# Patient Record
Sex: FEMALE | Race: WHITE | HISPANIC OR LATINO | Employment: OTHER | URBAN - METROPOLITAN AREA
[De-identification: names, ages, dates, MRNs, and addresses within clinical notes are randomized per-mention and may not be internally consistent; named-entity substitution may affect disease eponyms.]

---

## 2017-05-08 ENCOUNTER — ALLSCRIPTS OFFICE VISIT (OUTPATIENT)
Dept: OTHER | Facility: OTHER | Age: 72
End: 2017-05-08

## 2017-09-05 ENCOUNTER — APPOINTMENT (OUTPATIENT)
Dept: LAB | Facility: HOSPITAL | Age: 72
End: 2017-09-05
Attending: INTERNAL MEDICINE
Payer: MEDICARE

## 2017-09-05 ENCOUNTER — TRANSCRIBE ORDERS (OUTPATIENT)
Dept: ADMINISTRATIVE | Facility: HOSPITAL | Age: 72
End: 2017-09-05

## 2017-09-05 DIAGNOSIS — E03.9 UNSPECIFIED HYPOTHYROIDISM: ICD-10-CM

## 2017-09-05 DIAGNOSIS — I25.10 UNSPECIFIED CARDIOVASCULAR DISEASE: ICD-10-CM

## 2017-09-05 DIAGNOSIS — I25.10 UNSPECIFIED CARDIOVASCULAR DISEASE: Primary | ICD-10-CM

## 2017-09-05 LAB
ALBUMIN SERPL BCP-MCNC: 3.3 G/DL (ref 3.5–5)
ALP SERPL-CCNC: 52 U/L (ref 46–116)
ALT SERPL W P-5'-P-CCNC: 27 U/L (ref 12–78)
ANION GAP SERPL CALCULATED.3IONS-SCNC: 6 MMOL/L (ref 4–13)
AST SERPL W P-5'-P-CCNC: 17 U/L (ref 5–45)
BILIRUB SERPL-MCNC: 0.4 MG/DL (ref 0.2–1)
BUN SERPL-MCNC: 17 MG/DL (ref 5–25)
CALCIUM SERPL-MCNC: 9 MG/DL (ref 8.3–10.1)
CHLORIDE SERPL-SCNC: 106 MMOL/L (ref 100–108)
CO2 SERPL-SCNC: 32 MMOL/L (ref 21–32)
CREAT SERPL-MCNC: 1.04 MG/DL (ref 0.6–1.3)
GFR SERPL CREATININE-BSD FRML MDRD: 54 ML/MIN/1.73SQ M
GLUCOSE P FAST SERPL-MCNC: 89 MG/DL (ref 65–99)
POTASSIUM SERPL-SCNC: 3.8 MMOL/L (ref 3.5–5.3)
PROT SERPL-MCNC: 7.4 G/DL (ref 6.4–8.2)
SODIUM SERPL-SCNC: 144 MMOL/L (ref 136–145)
TSH SERPL DL<=0.05 MIU/L-ACNC: 0.73 UIU/ML (ref 0.36–3.74)

## 2017-09-05 PROCEDURE — 84443 ASSAY THYROID STIM HORMONE: CPT

## 2017-09-05 PROCEDURE — 36415 COLL VENOUS BLD VENIPUNCTURE: CPT | Performed by: INTERNAL MEDICINE

## 2017-09-05 PROCEDURE — 80053 COMPREHEN METABOLIC PANEL: CPT | Performed by: INTERNAL MEDICINE

## 2017-09-07 ENCOUNTER — GENERIC CONVERSION - ENCOUNTER (OUTPATIENT)
Dept: OTHER | Facility: OTHER | Age: 72
End: 2017-09-07

## 2017-09-07 ENCOUNTER — ALLSCRIPTS OFFICE VISIT (OUTPATIENT)
Dept: OTHER | Facility: OTHER | Age: 72
End: 2017-09-07

## 2017-09-15 ENCOUNTER — LAB REQUISITION (OUTPATIENT)
Dept: LAB | Facility: HOSPITAL | Age: 72
End: 2017-09-15
Payer: MEDICARE

## 2017-09-15 DIAGNOSIS — R31.21 ASYMPTOMATIC MICROSCOPIC HEMATURIA: ICD-10-CM

## 2017-09-15 LAB
BACTERIA UR QL AUTO: ABNORMAL /HPF
BILIRUB UR QL STRIP: NEGATIVE
CLARITY UR: CLEAR
COLOR UR: YELLOW
GLUCOSE UR STRIP-MCNC: NEGATIVE MG/DL
HGB UR QL STRIP.AUTO: ABNORMAL
KETONES UR STRIP-MCNC: NEGATIVE MG/DL
LEUKOCYTE ESTERASE UR QL STRIP: ABNORMAL
MUCOUS THREADS UR QL AUTO: ABNORMAL
NITRITE UR QL STRIP: NEGATIVE
NON-SQ EPI CELLS URNS QL MICRO: ABNORMAL /HPF
PH UR STRIP.AUTO: 6.5 [PH] (ref 5–9)
PROT UR STRIP-MCNC: NEGATIVE MG/DL
RBC #/AREA URNS AUTO: ABNORMAL /HPF
SP GR UR STRIP.AUTO: 1.01 (ref 1–1.03)
UROBILINOGEN UR QL STRIP.AUTO: 0.2 E.U./DL
WBC #/AREA URNS AUTO: ABNORMAL /HPF

## 2017-09-15 PROCEDURE — 81001 URINALYSIS AUTO W/SCOPE: CPT | Performed by: UROLOGY

## 2017-10-20 DIAGNOSIS — Z12.31 ENCOUNTER FOR SCREENING MAMMOGRAM FOR MALIGNANT NEOPLASM OF BREAST: ICD-10-CM

## 2018-01-13 VITALS
HEIGHT: 61 IN | BODY MASS INDEX: 35.02 KG/M2 | SYSTOLIC BLOOD PRESSURE: 148 MMHG | DIASTOLIC BLOOD PRESSURE: 68 MMHG | TEMPERATURE: 97.9 F | RESPIRATION RATE: 20 BRPM | WEIGHT: 185.5 LBS

## 2018-01-13 NOTE — PROGRESS NOTES
Assessment    1  Special screening for other neurological conditions (V80 09) (Z13 89)   2  Depression screen (V79 0) (Z13 89)   3  Chronic obstructive pulmonary disease (496) (J44 9)   4  Hypercholesterolemia (272 0) (E78 00)   5  Hypertension (401 9) (I10)   6  Painless hematuria (599 70) (R31 9)   7  Encounter for screening mammogram for breast cancer (V76 12) (Z12 31)   8  Need for influenza vaccination (V04 81) (Z23)    Plan   Chronic obstructive pulmonary disease    · Ventolin  (90 Base) MCG/ACT Inhalation Aerosol Solution; Two inhalations  every four to six hours as needed for wheeze   · Advair Diskus 500-50 MCG/DOSE Inhalation Aerosol Powder Breath Activated;  INHALE 1 PUFF TWICE DAILY   · Spiriva HandiHaler 18 MCG Inhalation Capsule; 1 capsule inhaled daily  Hypercholesterolemia, Hypertension    · Simvastatin 20 MG Oral Tablet (Zocor); take 1 tablet by mouth once daily  Hypertension    · Aspir-Low 81 MG Oral Tablet Delayed Release; TAKE 1 TABLET DAILY AS  DIRECTED   · Lisinopril 5 MG Oral Tablet; take 1 tablet by mouth once daily   · Metoprolol Tartrate 25 MG Oral Tablet; take 1/2 tablet by mouth twice a day  PMH: Need for influenza vaccination    · Fluzone High-Dose 0 5 ML Intramuscular Suspension Prefilled Syringe    *VB - Fall Risk Assessment  (Dx Z13 89 Screen for Neurologic Disorder); Status:Resulted - Requires Verification,Retrospective Authorization;   Done: 42RGG1379 12:00AM  Due:33Dke6622; Last Updated By:Fantasma Mckinney; 12/6/2016 1:34:42 PM;Ordered; For:Special screening for other neurological conditions; Ordered By:Mely Mack;   *VB - Urinary Incontinence Screen (Dx Z13 89 Screen for UI);  Status:Resulted - Requires Verification,Retrospective Authorization;   Done: 68CIA7421 12:00AM  Due:30Era4908; Last Updated Savannah Mims; 12/6/2016 1:34:42 PM;Ordered;    For:Encounter for special screening examination for genitourinary disorder, Special screening for other neurological conditions; Ordered By:Mely Mack;   *VB-Depression Screening; Status:Resulted - Requires Verification,Retrospective Authorization;   Done: 17URE5275 12:00AM  ICQ:[id number]; Last Updated Daxa Vallejo; 12/6/2016 1:34:42 PM;Ordered; For:Depression screen, Encounter for special screening examination for genitourinary disorder; Ordered By:Mely Mack;      Discussion/Summary  health maintenance visit Currently, she eats an adequate diet and has an inadequate exercise regimen  the risks and benefits of cervical cancer screening were discussed next cervical cancer screening is due now  patient will make another apt  doesnt want it right now Breast cancer screening: the risks and benefits of breast cancer screening were discussed and mammogram has been ordered  Colorectal cancer screening: the risks and benefits of colorectal cancer screening were discussed and colorectal cancer screening is current  The risks and benefits of immunizations were discussed, immunizations are needed and immunizations will be given as outlined in the orders  She was advised to be evaluated by an optometrist and a dentist  Advice and education were given regarding weight bearing exercise, weight loss, sunscreen use and advanced directive planning  Preventative: mammo needed  order given  Colonoscopy done 3 years ago, will obtain records  screening blood work current for this year  patient to RTC for PAP/pelvic because didn't want it today  HTN  well controlled on current regimen  meds refilled  CKD III: on ACEi  cont with HTN management  HLD: at goal on current statin dose  med refilled  COPD: quit smoking  symptoms on current regimen  meds refilled  Painless hematuria: seeing Dr Oma Mccord  has apt next week  Patient is able to Self-Care  Possible side effects of new medications were reviewed with the patient/guardian today  The treatment plan was reviewed with the patient/guardian   The patient/guardian understands and agrees with the treatment plan      Chief Complaint  cpe   REFILL ON MEDICATIONS      Advance Directives  Advance Directive [facility]:   The patient is not in agreement to receive the Advance Care Planning service    NO - Patient does not have an advance health care directive  Capacity/Competence: This patient has full decision making capacity for discussion of advance care planning and This patient has full decision making competency for discussion of advance care planning  History of Present Illness  HM, Adult Female:   General Health: The patient's health since the last visit is described as fair  She does not have regular dental visits  The patient hasn't seen dentist in over 5 years  used to wear dentures, but broke the bottom set so isnt wearing them anymore  She complains of vision problems  The patient complains of wears reading glasses, has seen optometry in over 5 years  Immunizations status: not up to date   Needs flu shot  Lifestyle:  She consumes a diverse and healthy diet  She does not exercise regularly  She does not use tobacco  She denies alcohol use  Reproductive health: the patient is postmenopausal    Screening: cancer screening reviewed and current  metabolic screening reviewed and updated  Review of Systems    Constitutional: no fever, not feeling poorly, no chills and not feeling tired  Eyes: eyesight problems and wears reading glasses  ENT: no sore throat and no hoarseness  Cardiovascular: no chest pain and no palpitations  Respiratory: no shortness of breath, no wheezing and no shortness of breath during exertion  Gastrointestinal: No complaints of abdominal pain, no constipation, no nausea or vomiting, no diarrhea, no bloody stools  Genitourinary: no incontinence  Musculoskeletal: arthralgias  Integumentary: dry skin  Neurological: no headache, no numbness, no tingling, no dizziness and no limb weakness     Psychiatric: no anxiety, no sleep disturbances and no depression  ROS reviewed  Active Problems    1  Acute Arthropathy Of The Hand (716 94)   2  Chronic obstructive pulmonary disease (496) (J44 9)   3  Hypercholesterolemia (272 0) (E78 00)   4  Hypertension (401 9) (I10)   5  Painless hematuria (599 70) (R31 9)   6  Postmenopausal Atrophic Vaginitis (627 3)   7  Sleep difficulties (780 50) (G47 9)   8  Urinary calculus (592 9) (N20 9)    Past Medical History    · History of Ankle pain, unspecified laterality   · History of Encounter for Papanicolaou cervical smear to confirm findings of recent  normal smear following initial abnormal smear (V72 32) (Z01 42)   · History of breast lump (V13 89) (N67 371)   · History of chronic obstructive lung disease (V12 69) (Z87 09)    Family History  Mother    · Family history of cerebrovascular accident (V17 1) (Z80 1)  Father    · Family history of diabetes mellitus (V18 0) (Z83 3)  Family History    · Family history of Emphysema   · Family history of Stroke Syndrome (V17 1)    Social History    · Ex-cigarette smoker (W65 28) (Z87 891)   · Never A Smoker   · Never Drank Alcohol   · Never Used Drugs    Current Meds   1  Advair Diskus 500-50 MCG/DOSE MISC; Therapy: (Recorded:74Afc6133) to Recorded   2  Aspir-Low 81 MG Oral Tablet Delayed Release; TAKE 1 TABLET DAILY AS DIRECTED; Therapy: 21GAI4402 to (Evaluate:23Qvy4879); Last Rx:23Jan2014 Ordered   3  Calcium 600 MG Oral Tablet; Therapy: (Carmell Spray) to Recorded   4  Daily Multiple Vitamins Oral Tablet; Therapy: (Carmell Spray) to Recorded   5  Lisinopril 5 MG Oral Tablet; take 1 tablet by mouth once daily; Therapy: 81WZU3570 to (Last Rx:37Wxf9806)  Requested for: 57Hpv9807 Ordered   6  Metoprolol Tartrate 25 MG Oral Tablet; take 1/2 tablet by mouth twice a day; Therapy: 64MHV9444 to (Last Rx:26Mrf8330)  Requested for: 31Kze2158 Ordered   7  Simvastatin 20 MG Oral Tablet; take 1 tablet by mouth once daily;    Therapy: 39CTJ6010 to (Evaluate:13Yes3039)  Requested for: 40JZA9088; Last   Rx:13Twi6630 Ordered   8  Spiriva HandiHaler 18 MCG Inhalation Capsule; Therapy: (Recorded:20Jan2014) to Recorded    Allergies    1  No Known Drug Allergies    2  No Known Latex Allergies    Vitals   Recorded: 16ZFN6597 10:59AM   Temperature 98 2 F   Heart Rate 72   Respiration 16   Systolic 456   Diastolic 64   Height 5 ft 1 in   Weight 183 lb    BMI Calculated 34 58   BSA Calculated 1 82   O2 Saturation 89   Pain Scale 0     Physical Exam    Constitutional obese  Eyes   Conjunctiva and lids: No swelling, erythema or discharge  Pupils and irises: Equal, round, reactive to light  Ears, Nose, Mouth, and Throat   Nasal mucosa, septum, and turbinates: Normal without edema or erythema  poor dentition  Oropharynx: Normal with no erythema, edema, exudate or lesions  Neck   Neck: Supple, symmetric, trachea midline, no masses  Thyroid: Normal, no thyromegaly  Pulmonary   Respiratory effort: No increased work of breathing or signs of respiratory distress  Auscultation of lungs: Clear to auscultation  Cardiovascular   Auscultation of heart: Normal rate and rhythm, normal S1 and S2, no murmurs  Pedal pulses: 2+ bilaterally  trace pitting edema in BLE  Abdomen   Abdomen: Non-tender, no masses  Liver and spleen: No hepatomegaly or splenomegaly  Musculoskeletal   Gait and station: Normal     Digits and nails: Normal without clubbing or cyanosis  Skin dry skin over elbow and knees, as well as feet, no breaks in skin     Psychiatric   Mood and affect: Normal        Results/Data  AUA Symptom Score 80ULN6633 11:07AM User, s     Test Name Result Flag Reference   AUA Symptom Score (for prostate disease) 1     Incomplete emptying: Not at all (0)  Frequency: Not at all (0)  Intermittency: Less than 1 times in 5 (1)  Urgency: Not at all (0)  Weak-stream: Not at all (0)  Straining: Not at all (0)  Nocturia: Not at all (0)   AUA Symptom Score (for prostate disease) - Score Category Mild     AUA Symptom Score (for prostate disease) - Quality of Life Due to Urinary Symptoms No Feelings         Attending Note  Attending Note: Attending Note: I discussed the case with the Resident and reviewed the Resident's note and I agree with the Resident management plan as it was presented to me  Level of Participation: I was present in clinic, but did not examine the patient  Signatures   Electronically signed by : Cary Otoole MD; Dec  6 2016 11:52AM EST                       (Author)    Electronically signed by :  JOAQUIN Castillo ; Dec 19 2016  4:41PM EST                       (Author)

## 2018-01-22 VITALS
WEIGHT: 185.6 LBS | OXYGEN SATURATION: 86 % | HEART RATE: 66 BPM | DIASTOLIC BLOOD PRESSURE: 70 MMHG | HEIGHT: 61 IN | SYSTOLIC BLOOD PRESSURE: 110 MMHG | BODY MASS INDEX: 35.04 KG/M2

## 2018-02-23 DIAGNOSIS — I10 ESSENTIAL HYPERTENSION: Primary | ICD-10-CM

## 2018-05-23 ENCOUNTER — TELEPHONE (OUTPATIENT)
Dept: PULMONOLOGY | Facility: MEDICAL CENTER | Age: 73
End: 2018-05-23

## 2018-05-23 DIAGNOSIS — J44.9 COPD (CHRONIC OBSTRUCTIVE PULMONARY DISEASE) WITH CHRONIC BRONCHITIS (HCC): Primary | ICD-10-CM

## 2018-05-23 NOTE — TELEPHONE ENCOUNTER
WAS A PATIENT OF DR VIVAR- BUT DOESN'T HAVE AN APPT HERE UNTIL June, SHE NEEDS A REFILL ON HER ADVAIR 500   CAN YOU PLEASE CALL HER IN A MONTH SUPPLY TO [facility] IN [place]

## 2018-05-30 DIAGNOSIS — J44.9 COPD MIXED TYPE (HCC): Primary | ICD-10-CM

## 2018-06-05 ENCOUNTER — OFFICE VISIT (OUTPATIENT)
Dept: FAMILY MEDICINE CLINIC | Facility: CLINIC | Age: 73
End: 2018-06-05
Payer: MEDICARE

## 2018-06-05 VITALS
SYSTOLIC BLOOD PRESSURE: 140 MMHG | WEIGHT: 181 LBS | HEART RATE: 76 BPM | OXYGEN SATURATION: 90 % | RESPIRATION RATE: 16 BRPM | HEIGHT: 61 IN | BODY MASS INDEX: 34.17 KG/M2 | DIASTOLIC BLOOD PRESSURE: 72 MMHG

## 2018-06-05 DIAGNOSIS — N18.30 STAGE 3 CHRONIC KIDNEY DISEASE (HCC): ICD-10-CM

## 2018-06-05 DIAGNOSIS — I25.119 CORONARY ARTERY DISEASE INVOLVING NATIVE HEART WITH ANGINA PECTORIS, UNSPECIFIED VESSEL OR LESION TYPE (HCC): ICD-10-CM

## 2018-06-05 DIAGNOSIS — E78.2 MIXED HYPERLIPIDEMIA: ICD-10-CM

## 2018-06-05 DIAGNOSIS — I10 ESSENTIAL HYPERTENSION: Primary | ICD-10-CM

## 2018-06-05 DIAGNOSIS — J43.2 CENTRILOBULAR EMPHYSEMA (HCC): ICD-10-CM

## 2018-06-05 PROBLEM — Z12.31 ENCOUNTER FOR SCREENING MAMMOGRAM FOR BREAST CANCER: Status: ACTIVE | Noted: 2018-06-05

## 2018-06-05 PROBLEM — I25.10 CAD (CORONARY ARTERY DISEASE): Status: ACTIVE | Noted: 2018-06-05

## 2018-06-05 PROCEDURE — 99397 PER PM REEVAL EST PAT 65+ YR: CPT | Performed by: FAMILY MEDICINE

## 2018-06-05 RX ORDER — IBUPROFEN 200 MG
CAPSULE ORAL
COMMUNITY

## 2018-06-05 RX ORDER — SIMVASTATIN 20 MG
TABLET ORAL
COMMUNITY
Start: 2018-04-19 | End: 2018-07-21 | Stop reason: SDUPTHER

## 2018-06-05 RX ORDER — ALBUTEROL SULFATE 90 UG/1
AEROSOL, METERED RESPIRATORY (INHALATION)
COMMUNITY
Start: 2016-12-06 | End: 2019-06-03 | Stop reason: SDUPTHER

## 2018-06-05 RX ORDER — ASPIRIN 81 MG/1
1 TABLET ORAL DAILY
COMMUNITY
Start: 2014-01-23

## 2018-06-05 RX ORDER — LISINOPRIL 5 MG/1
1 TABLET ORAL DAILY
COMMUNITY
Start: 2015-01-08 | End: 2018-08-22 | Stop reason: SDUPTHER

## 2018-06-05 RX ORDER — TIOTROPIUM BROMIDE 18 UG/1
CAPSULE ORAL; RESPIRATORY (INHALATION)
COMMUNITY
Start: 2018-05-23 | End: 2018-06-11 | Stop reason: SDUPTHER

## 2018-06-05 NOTE — PROGRESS NOTES
Assessment/Plan:     Diagnoses and all orders for this visit:    Essential hypertension  -     Basic metabolic panel; Future    Centrilobular emphysema (HCC)    Coronary artery disease involving native heart with angina pectoris, unspecified vessel or lesion type (HCC)    Mixed hyperlipidemia  -     Lipid panel; Future    Stage 3 chronic kidney disease  -     Basic metabolic panel; Future    Other orders  -     Leticia Rana 18 MCG inhalation capsule;   -     simvastatin (ZOCOR) 20 mg tablet;   -     aspirin (ASPIR-LOW) 81 mg EC tablet; Take 1 tablet by mouth daily  -     Calcium 600 MG tablet; Take by mouth  -     lisinopril (ZESTRIL) 5 mg tablet; Take 1 tablet by mouth daily  -     albuterol (VENTOLIN HFA) 90 mcg/act inhaler; Inhale  -     DAILY MULTIPLE VITAMINS PO; Take by mouth          Subjective:      Patient ID: Yesica Calvo is a 68 y o  female  Patient is a 77-year-old female with past medical history of COPD, hypertension, hyperlipidemia, CKD stage III, CAD here for follow-up  Patient currently on 4 L NC Oxygen q h s  patient was given referral for pulmonology and has an appointment on 06/11/2018  Patient also follows up with Cardiology, Dr Danelle Perez and has an upcoming appointment in September  Patient was given a mammogram slip during last visit but never got it done  She denies any smoking and drinks alcohol on occasion maybe couple times a year  Patient was glasses and [facility] to [facility]  Denies any fever, chest pain, shortness of breath, vision changes, abdominal or urinary symptoms  The following portions of the patient's history were reviewed and updated as appropriate: allergies, current medications, past family history, past medical history, past social history, past surgical history and problem list     Review of Systems   Constitutional: Negative for fever and unexpected weight change  HENT: Negative  Eyes: Negative  Respiratory: Negative    Negative for shortness of breath  Cardiovascular: Negative  Negative for chest pain  Gastrointestinal: Negative  Negative for abdominal pain, blood in stool, constipation, diarrhea, nausea and vomiting  Genitourinary: Negative  Neurological: Negative  Psychiatric/Behavioral: Negative  Objective:      /72 (BP Location: Left arm, Patient Position: Sitting)   Pulse 76   Resp 16   Ht 5' 1" (1 549 m)   Wt 82 1 kg (181 lb)   SpO2 90%   BMI 34 20 kg/m²          Physical Exam   Constitutional: She is oriented to person, place, and time  She appears well-developed and well-nourished  No distress  HENT:   Head: Normocephalic and atraumatic  Nose: Nose normal    Mouth/Throat: Oropharynx is clear and moist    Eyes: Conjunctivae and EOM are normal  Pupils are equal, round, and reactive to light  Neck: Normal range of motion  Neck supple  Cardiovascular: Normal rate, regular rhythm and normal heart sounds  Pulmonary/Chest: Effort normal and breath sounds normal  No respiratory distress  She has no wheezes  She has no rales  She exhibits no tenderness  Abdominal: Soft  Bowel sounds are normal  She exhibits no distension and no mass  There is no tenderness  There is no rebound and no guarding  Musculoskeletal: Normal range of motion  She exhibits no edema, tenderness or deformity  Lymphadenopathy:     She has no cervical adenopathy  Neurological: She is alert and oriented to person, place, and time  Skin: Skin is warm and dry  No rash noted  No erythema  No pallor  Psychiatric: She has a normal mood and affect  Nursing note and vitals reviewed

## 2018-06-11 ENCOUNTER — OFFICE VISIT (OUTPATIENT)
Dept: PULMONOLOGY | Facility: MEDICAL CENTER | Age: 73
End: 2018-06-11
Payer: MEDICARE

## 2018-06-11 ENCOUNTER — HOSPITAL ENCOUNTER (OUTPATIENT)
Dept: RADIOLOGY | Facility: HOSPITAL | Age: 73
Discharge: HOME | End: 2018-06-11
Payer: MEDICARE

## 2018-06-11 ENCOUNTER — TRANSCRIBE ORDERS (OUTPATIENT)
Dept: ADMINISTRATIVE | Facility: HOSPITAL | Age: 73
End: 2018-06-11

## 2018-06-11 VITALS
WEIGHT: 183 LBS | TEMPERATURE: 98.1 F | HEIGHT: 63 IN | SYSTOLIC BLOOD PRESSURE: 134 MMHG | BODY MASS INDEX: 32.43 KG/M2 | OXYGEN SATURATION: 89 % | HEART RATE: 65 BPM | RESPIRATION RATE: 12 BRPM | DIASTOLIC BLOOD PRESSURE: 82 MMHG

## 2018-06-11 DIAGNOSIS — R06.02 SOB (SHORTNESS OF BREATH) ON EXERTION: Primary | ICD-10-CM

## 2018-06-11 DIAGNOSIS — J96.11 CHRONIC RESPIRATORY FAILURE WITH HYPOXIA (HCC): ICD-10-CM

## 2018-06-11 DIAGNOSIS — Z87.891 FORMER SMOKER: ICD-10-CM

## 2018-06-11 DIAGNOSIS — J43.2 CENTRILOBULAR EMPHYSEMA (HCC): ICD-10-CM

## 2018-06-11 DIAGNOSIS — R06.02 SOB (SHORTNESS OF BREATH): ICD-10-CM

## 2018-06-11 DIAGNOSIS — R06.02 SOB (SHORTNESS OF BREATH): Primary | ICD-10-CM

## 2018-06-11 DIAGNOSIS — R06.00 DYSPNEA ON EXERTION: ICD-10-CM

## 2018-06-11 PROBLEM — R06.09 DYSPNEA ON EXERTION: Status: ACTIVE | Noted: 2018-06-11

## 2018-06-11 PROCEDURE — 99214 OFFICE O/P EST MOD 30 MIN: CPT | Performed by: NURSE PRACTITIONER

## 2018-06-11 PROCEDURE — 71046 X-RAY EXAM CHEST 2 VIEWS: CPT

## 2018-06-11 PROCEDURE — 94010 BREATHING CAPACITY TEST: CPT | Performed by: NURSE PRACTITIONER

## 2018-06-11 RX ORDER — ALBUTEROL SULFATE 2.5 MG/3ML
2.5 SOLUTION RESPIRATORY (INHALATION) 2 TIMES DAILY
Qty: 180 ML | Refills: 5 | Status: SHIPPED | OUTPATIENT
Start: 2018-06-11 | End: 2018-07-11

## 2018-06-11 RX ORDER — ALBUTEROL SULFATE 90 UG/1
2 AEROSOL, METERED RESPIRATORY (INHALATION) EVERY 6 HOURS PRN
COMMUNITY
End: 2018-06-11 | Stop reason: SDUPTHER

## 2018-06-11 RX ORDER — ALBUTEROL SULFATE 90 UG/1
2 AEROSOL, METERED RESPIRATORY (INHALATION) 4 TIMES DAILY
Qty: 18 G | Refills: 5 | Status: SHIPPED | OUTPATIENT
Start: 2018-06-11 | End: 2018-07-11

## 2018-06-11 RX ORDER — TIOTROPIUM BROMIDE 18 UG/1
18 CAPSULE ORAL; RESPIRATORY (INHALATION) DAILY
Qty: 30 CAPSULE | Refills: 5 | Status: SHIPPED | OUTPATIENT
Start: 2018-06-11 | End: 2018-12-27 | Stop reason: SDUPTHER

## 2018-06-11 NOTE — ASSESSMENT & PLAN NOTE
Debbie Schuster has moderately severe COPD with emphysema  Forced vital capacity is 1 59 L or 58% of predicted, FEV1 was 0 70 L or 34% and obstruction ratio is 44%  She is a former smoker and had smoked 1 pack a day for 30+ years and quit in 2010  She does have shortness of breath on exertion  Currently her COPD appears stable  I did review her records from her past pulmonologist in [place] and obstruction ratio appears to be the same  She will continue with her present regimen which includes Advair 500/51 puff b i d , Spiriva 1 puff daily and an albuterol as needed

## 2018-06-11 NOTE — ASSESSMENT & PLAN NOTE
Nila Varghese has history of dyspnea on exertion  She does follow with Cardiology  PFTs done today in the office reveal moderately severe obstruction  FVC was 1 59 L or 58% of predicted, FEV1 0 70 L or 34% of predicted obstruction  Ratio is 44%  She is using optimal therapy including Advair 500/51 puff b i d , Spiriva Handy haler 1 puff daily and Ventolin as needed  I did reorder albuterol for her nebulizer in did ask her to please use at least twice daily  She is agreeable to plan  Of care  She did qualify for supplemental oxygen but defers at this time  [place]jo oxygen with exertion was 88%  Weight loss would be advisable for this patient who lives a relatively sedentary lifestyle  Overall she is stable and I would request that a chest x-ray be done for baseline purposes

## 2018-06-11 NOTE — PATIENT INSTRUCTIONS
You have moderately severe COPD  Pulmonary function test done today are similar to those done in past   Your a former smoker and therefore I am ordering for you a screening CT scan of your lungs  Continue using her medication as prescribed  Follow-up in 6 months

## 2018-06-11 NOTE — PROGRESS NOTES
Assessment/Plan:       Problem List Items Addressed This Visit        Respiratory    Centrilobular emphysema ([address] )     Shelby Capellan has moderately severe COPD with emphysema  Forced vital capacity is 1 59 L or 58% of predicted, FEV1 was 0 70 L or 34% and obstruction ratio is 44%  She is a former smoker and had smoked 1 pack a day for 30+ years and quit in 2010  She does have shortness of breath on exertion  Currently her COPD appears stable  I did review her records from her past pulmonologist in [place] and obstruction ratio appears to be the same  She will continue with her present regimen which includes Advair 500/51 puff b i d , Spiriva 1 puff daily and an albuterol as needed  Relevant Medications    albuterol (2 5 mg/3 mL) 0 083 % nebulizer solution    SPIRIVA HANDIHALER 18 MCG inhalation capsule    albuterol (PROVENTIL HFA,VENTOLIN HFA) 90 mcg/act inhaler    Chronic respiratory failure with hypoxia (HCC)     Shelby Capellan has a history of chronic respiratory failure with hypoxia  Review of notes from 2011 stated that she had use supplemental oxygen on continuous basis  According to patient she currently uses 4 L at nighttime only  Room air oxygen at rest is 92% with exertion went to 88%  She does qualify for daytime oxygen at 2 L but currently defers  She also follows with cardiologist Dr Carmen Martinez  She has history of chronic kidney disease and also hypercholesterolemia  She also has a history of coronary artery disease that was non critical   Obesity  Exertional dyspnea secondary to underlying COPD  She had a sleep study done about 1 year ago which we resulted in no evidence of sleep disordered breathing  Other    Dyspnea on exertion     Shelby Capellan has history of dyspnea on exertion  She does follow with Cardiology  PFTs done today in the office reveal moderately severe obstruction  FVC was 1 59 L or 58% of predicted, FEV1 0 70 L or 34% of predicted obstruction  Ratio is 44%    She is using optimal therapy including Advair 500/51 puff b i d , Spiriva Handy haler 1 puff daily and Ventolin as needed  I did reorder albuterol for her nebulizer in did ask her to please use at least twice daily  She is agreeable to plan  Of care  She did qualify for supplemental oxygen but defers at this time  Jojo oxygen with exertion was 88%  Weight loss would be advisable for this patient who lives a relatively sedentary lifestyle  Overall she is stable and I would request that a chest x-ray be done for baseline purposes  Other Visit Diagnoses     SOB (shortness of breath) on exertion    -  Primary    Relevant Orders    POCT spirometry (Completed)    Former smoker        Relevant Orders    CT lung screening program            No Follow-up on file  All questions are answered to the patient's satisfaction and understanding  She verbalizes understanding  She is encouraged to call with any further questions or concerns  Portions of the record may have been created with voice recognition software  Occasional wrong word or "sound a like" substitutions may have occurred due to the inherent limitations of voice recognition software  Read the chart carefully and recognize, using context, where substitutions have occurred  ______________________________________________________________________    Chief Complaint:   Chief Complaint   Patient presents with   Lindsay Cisneros     was Dr Nash Given  Hx of COPD ,    Cough     off and on  non prodcutive     Shortness of Breath     upon exertion        Patient ID: Maki Woods is a 68 y o  y o  female has a past medical history of Breast lump; CHF (congestive heart failure) ([facility] [address] ); COPD (chronic obstructive pulmonary disease) ([facility] [address] ); Diverticulitis; Emphysema of lung ([facility] [address] ); History of chronic obstructive lung disease; Non-productive cough; and SOBOE (shortness of breath on exertion)  6/11/2018  Maki Woods is a 60-year-old female who has a history of COPD    She has followed with Dr Annalee Sousa in the past who recently retired  According to medical records she was diagnosed with COPD several years ago and has history of congestive heart failure  In 2011 she was using 4 L of oxygen 24 hr a day as well as Spiriva Combivent and Duo nebs  In the past her FEV1 has been 32% of predicted with had put her in the category of severe COPD  She has smoked 1 pack a day cigarettes for at least 30 years and quit in March 2010  Chest x-ray done in 2010 showed hyperinflation  She also had an echo done in May of 2010 showing a right ventricular systolic pressure upper limits of normal without significant pulmonary hypertension and normal LV and systolic function  She did have spirometry done in January 2011 showing FEV1 severely reduced at 1 04 L or 48% of predicted with obstruction ratio 57  In 2011 she was also hypoxic on room air  According to patient who presents today as a new patient, she now uses only oxygen at night 4 L supplemental oxygen  She does not use oxygen during the day nor does she want it  She was last seen by Dr Annalee Sousa in November of 2017  According to patient's notes she did have overnight oxygen supplied by [employer]   In November of 2017 nighttime oxygen on room air revealed oxygen below 89% for 6 hr and 56 min  PFTs that her reviewed from the past reveal forced vital capacity of 1 19 L or 56% of predicted, FEV1 0 71 L or 40% of predicted obstruction ratio is 59%  Flow volume loop shows severe obstructive defect  Post bronchodilation in the past did not show any significant improvement  Cough   This is a chronic problem  The current episode started more than 1 year ago  The problem has been waxing and waning  The problem occurs hourly  The cough is non-productive  Associated symptoms include postnasal drip and shortness of breath  The symptoms are aggravated by exercise  Risk factors for lung disease include smoking/tobacco exposure   She has tried a beta-agonist inhaler, ipratropium inhaler and steroid inhaler for the symptoms  The treatment provided mild relief  Her past medical history is significant for COPD and emphysema  Shortness of Breath   This is a chronic problem  The current episode started more than 1 year ago  The problem has been unchanged  The average episode lasts 60 seconds  The symptoms are aggravated by exercise  The patient has no known risk factors for DVT/PE  She has tried beta agonist inhalers, ipratropium inhalers and rest for the symptoms  The treatment provided mild relief  Her past medical history is significant for chronic lung disease and COPD  Occupational/Exposure history:  Travel history:  Review of Systems   Constitutional: Negative  HENT: Positive for postnasal drip  Eyes: Negative  Respiratory: Positive for cough and shortness of breath  Cardiovascular: Negative  Gastrointestinal: Negative  Endocrine: Negative  Genitourinary: Negative  Musculoskeletal: Negative  Skin: Negative  Allergic/Immunologic: Negative  Neurological: Negative  Hematological: Negative  Psychiatric/Behavioral: Negative  Social history: She reports that she quit smoking about 8 years ago  She has a 82 50 pack-year smoking history  She has never used smokeless tobacco  She reports that she does not drink alcohol or use drugs  Past surgical history: History reviewed  No pertinent surgical history    Family history:   Family History   Problem Relation Age of Onset    Stroke Mother     Diabetes Father     Heart disease Father     Emphysema Family     Stroke Family      syndrome    Diabetes Sister        Immunization History   Administered Date(s) Administered    Influenza Quadrivalent Preservative Free 3 years and older IM 03/25/2014    Influenza Split High Dose Preservative Free IM 12/06/2016    Influenza TIV (IM) 12/17/2012    Pneumococcal Polysaccharide PPV23 12/17/2012    Tdap 12/17/2012     Current Outpatient Prescriptions   Medication Sig Dispense Refill    aspirin (ASPIR-LOW) 81 mg EC tablet Take 1 tablet by mouth daily      Calcium 600 MG tablet Take by mouth      DAILY MULTIPLE VITAMINS PO Take by mouth      fluticasone-salmeterol (ADVAIR) 500-50 mcg/dose Inhale 1 puff every 12 (twelve) hours 1 Inhaler 2    lisinopril (ZESTRIL) 5 mg tablet Take 1 tablet by mouth daily      metoprolol tartrate (LOPRESSOR) 25 mg tablet Take 0 5 tablets (12 5 mg total) by mouth 2 (two) times a day 60 tablet 5    simvastatin (ZOCOR) 20 mg tablet       SPIRIVA HANDIHALER 18 MCG inhalation capsule Place 1 capsule (18 mcg total) into inhaler and inhale daily for 30 days 30 capsule 5    albuterol (2 5 mg/3 mL) 0 083 % nebulizer solution Take 1 vial (2 5 mg total) by nebulization 2 (two) times a day for 30 days 180 mL 5    albuterol (PROVENTIL HFA,VENTOLIN HFA) 90 mcg/act inhaler Inhale 2 puffs 4 (four) times a day for 30 days 18 g 5    albuterol (VENTOLIN HFA) 90 mcg/act inhaler Inhale       No current facility-administered medications for this visit  Allergies: Patient has no known allergies  Objective:  Vitals:    06/11/18 0903   BP: 134/82   BP Location: Left arm   Patient Position: Sitting   Cuff Size: Adult   Pulse: 65   Resp: 12   Temp: 98 1 °F (36 7 °C)   TempSrc: Oral   SpO2: (!) 89%   Weight: 83 kg (183 lb)   Height: 5' 3" (1 6 m)   Oxygen Therapy  SpO2: (!) 89 %    Wt Readings from Last 3 Encounters:   06/11/18 83 kg (183 lb)   06/05/18 82 1 kg (181 lb)   09/07/17 84 2 kg (185 lb 9 6 oz)     Body mass index is 32 42 kg/m²  Physical Exam   Constitutional: She is oriented to person, place, and time  She appears well-developed and well-nourished  HENT:   Head: Normocephalic and atraumatic  Mallampati 4   Neck: Normal range of motion  Cardiovascular: Normal rate and regular rhythm  Pulmonary/Chest: Effort normal and breath sounds normal    Abdominal: Soft     Musculoskeletal: Normal range of motion  Neurological: She is alert and oriented to person, place, and time  Skin: Skin is warm and dry  Psychiatric: She has a normal mood and affect  Her behavior is normal        Lab Review:   No visits with results within 6 Month(s) from this visit  Latest known visit with results is:   Lab Requisition on 09/15/2017   Component Date Value    Color, UA 09/15/2017 Yellow     Clarity, UA 09/15/2017 Clear     Specific Gravity, UA 09/15/2017 1 015     pH, UA 09/15/2017 6 5     Leukocytes, UA 09/15/2017 Small*    Nitrite, UA 09/15/2017 Negative     Protein, UA 09/15/2017 Negative     Glucose, UA 09/15/2017 Negative     Ketones, UA 09/15/2017 Negative     Urobilinogen, UA 09/15/2017 0 2     Bilirubin, UA 09/15/2017 Negative     Blood, UA 09/15/2017 Small*    RBC, UA 09/15/2017 2-4*    WBC, UA 09/15/2017 4-10*    Epithelial Cells 09/15/2017 Innumerable*    Bacteria, UA 09/15/2017 Occasional     MUCOUS THREADS 09/15/2017 Moderate        Diagnostics:  I have personally reviewed pertinent reports  Pulmonary function tests:  Office Spirometry Results:  FEV1: 0 79 liters (34%)  FVC: 1 59 liters (58%)  FEV1/FVC: 49 7 %  ESS:    No results found

## 2018-06-11 NOTE — ASSESSMENT & PLAN NOTE
Bud Eisenberg has a history of chronic respiratory failure with hypoxia  Review of notes from 2011 stated that she had use supplemental oxygen on continuous basis  According to patient she currently uses 4 L at nighttime only  Room air oxygen at rest is 92% with exertion went to 88%  She does qualify for daytime oxygen at 2 L but currently defers  She also follows with cardiologist Dr Roe Canchola  She has history of chronic kidney disease and also hypercholesterolemia  She also has a history of coronary artery disease that was non critical   Obesity  Exertional dyspnea secondary to underlying COPD  She had a sleep study done about 1 year ago which we resulted in no evidence of sleep disordered breathing

## 2018-07-21 DIAGNOSIS — E78.2 MIXED HYPERLIPIDEMIA: Primary | ICD-10-CM

## 2018-07-25 RX ORDER — SIMVASTATIN 20 MG
TABLET ORAL
Qty: 90 TABLET | Refills: 3 | Status: SHIPPED | OUTPATIENT
Start: 2018-07-25 | End: 2018-11-12 | Stop reason: ALTCHOICE

## 2018-08-22 DIAGNOSIS — I10 ESSENTIAL HYPERTENSION: Primary | ICD-10-CM

## 2018-08-22 RX ORDER — LISINOPRIL 5 MG/1
TABLET ORAL
Qty: 90 TABLET | Refills: 2 | Status: SHIPPED | OUTPATIENT
Start: 2018-08-22 | End: 2019-05-10 | Stop reason: SDUPTHER

## 2018-08-27 ENCOUNTER — APPOINTMENT (OUTPATIENT)
Dept: LAB | Facility: HOSPITAL | Age: 73
End: 2018-08-27
Attending: INTERNAL MEDICINE
Payer: MEDICARE

## 2018-08-27 ENCOUNTER — TRANSCRIBE ORDERS (OUTPATIENT)
Dept: ADMINISTRATIVE | Facility: HOSPITAL | Age: 73
End: 2018-08-27

## 2018-08-27 DIAGNOSIS — E78.00 PURE HYPERCHOLESTEROLEMIA: ICD-10-CM

## 2018-08-27 DIAGNOSIS — N18.30 STAGE 3 CHRONIC KIDNEY DISEASE (HCC): ICD-10-CM

## 2018-08-27 DIAGNOSIS — N18.30 CHRONIC KIDNEY DISEASE, STAGE III (MODERATE) (HCC): ICD-10-CM

## 2018-08-27 DIAGNOSIS — E78.2 MIXED HYPERLIPIDEMIA: ICD-10-CM

## 2018-08-27 DIAGNOSIS — I10 ESSENTIAL HYPERTENSION, MALIGNANT: ICD-10-CM

## 2018-08-27 DIAGNOSIS — N18.30 CHRONIC KIDNEY DISEASE, STAGE III (MODERATE) (HCC): Primary | ICD-10-CM

## 2018-08-27 DIAGNOSIS — I10 ESSENTIAL HYPERTENSION: ICD-10-CM

## 2018-08-27 DIAGNOSIS — I25.10 CARDIOVASCULAR DISEASE: ICD-10-CM

## 2018-08-27 DIAGNOSIS — E03.9 HYPOTHYROIDISM: ICD-10-CM

## 2018-08-27 LAB
ALBUMIN SERPL BCP-MCNC: 3.6 G/DL (ref 3.5–5)
ALP SERPL-CCNC: 60 U/L (ref 46–116)
ALT SERPL W P-5'-P-CCNC: 34 U/L (ref 12–78)
ANION GAP SERPL CALCULATED.3IONS-SCNC: 6 MMOL/L (ref 4–13)
AST SERPL W P-5'-P-CCNC: 29 U/L (ref 5–45)
BILIRUB SERPL-MCNC: 0.9 MG/DL (ref 0.2–1)
BUN SERPL-MCNC: 11 MG/DL (ref 5–25)
CALCIUM SERPL-MCNC: 8.8 MG/DL (ref 8.3–10.1)
CHLORIDE SERPL-SCNC: 101 MMOL/L (ref 100–108)
CHOLEST SERPL-MCNC: 167 MG/DL (ref 50–200)
CO2 SERPL-SCNC: 33 MMOL/L (ref 21–32)
CREAT SERPL-MCNC: 1.02 MG/DL (ref 0.6–1.3)
GFR SERPL CREATININE-BSD FRML MDRD: 55 ML/MIN/1.73SQ M
GLUCOSE P FAST SERPL-MCNC: 91 MG/DL (ref 65–99)
HDLC SERPL-MCNC: 58 MG/DL (ref 40–60)
LDLC SERPL CALC-MCNC: 92 MG/DL (ref 0–100)
NONHDLC SERPL-MCNC: 109 MG/DL
POTASSIUM SERPL-SCNC: 3.9 MMOL/L (ref 3.5–5.3)
PROT SERPL-MCNC: 7.8 G/DL (ref 6.4–8.2)
SODIUM SERPL-SCNC: 140 MMOL/L (ref 136–145)
TRIGL SERPL-MCNC: 86 MG/DL

## 2018-08-27 PROCEDURE — 36415 COLL VENOUS BLD VENIPUNCTURE: CPT

## 2018-08-27 PROCEDURE — 80061 LIPID PANEL: CPT

## 2018-08-27 PROCEDURE — 80053 COMPREHEN METABOLIC PANEL: CPT

## 2018-09-19 ENCOUNTER — LAB REQUISITION (OUTPATIENT)
Dept: LAB | Facility: HOSPITAL | Age: 73
End: 2018-09-19
Payer: MEDICARE

## 2018-09-19 DIAGNOSIS — R31.21 ASYMPTOMATIC MICROSCOPIC HEMATURIA: ICD-10-CM

## 2018-09-19 PROCEDURE — 88112 CYTOPATH CELL ENHANCE TECH: CPT | Performed by: PATHOLOGY

## 2018-10-24 ENCOUNTER — TELEPHONE (OUTPATIENT)
Dept: FAMILY MEDICINE CLINIC | Facility: CLINIC | Age: 73
End: 2018-10-24

## 2018-10-24 DIAGNOSIS — Z12.31 BREAST CANCER SCREENING BY MAMMOGRAM: Primary | ICD-10-CM

## 2018-10-24 NOTE — TELEPHONE ENCOUNTER
Pt needs a new order for Mammogram screening, was given order 10/20/18 and  Never went for isis        Please call Angeli Banco (Daughter) [phone]

## 2018-11-02 DIAGNOSIS — J44.9 COPD MIXED TYPE (HCC): ICD-10-CM

## 2018-11-12 ENCOUNTER — OFFICE VISIT (OUTPATIENT)
Dept: CARDIOLOGY CLINIC | Facility: CLINIC | Age: 73
End: 2018-11-12
Payer: MEDICARE

## 2018-11-12 VITALS
BODY MASS INDEX: 33.49 KG/M2 | DIASTOLIC BLOOD PRESSURE: 80 MMHG | WEIGHT: 182 LBS | HEIGHT: 62 IN | HEART RATE: 75 BPM | OXYGEN SATURATION: 93 % | SYSTOLIC BLOOD PRESSURE: 132 MMHG

## 2018-11-12 DIAGNOSIS — E66.9 CLASS 2 OBESITY: ICD-10-CM

## 2018-11-12 DIAGNOSIS — J43.2 CENTRILOBULAR EMPHYSEMA (HCC): ICD-10-CM

## 2018-11-12 DIAGNOSIS — N18.30 CHRONIC KIDNEY DISEASE, STAGE III (MODERATE) (HCC): ICD-10-CM

## 2018-11-12 DIAGNOSIS — I10 ESSENTIAL HYPERTENSION: ICD-10-CM

## 2018-11-12 DIAGNOSIS — E78.5 DYSLIPIDEMIA: ICD-10-CM

## 2018-11-12 DIAGNOSIS — I25.10 CAD IN NATIVE ARTERY: Primary | ICD-10-CM

## 2018-11-12 DIAGNOSIS — R06.00 DYSPNEA ON EXERTION: ICD-10-CM

## 2018-11-12 DIAGNOSIS — F51.01 PRIMARY INSOMNIA: ICD-10-CM

## 2018-11-12 PROCEDURE — 99214 OFFICE O/P EST MOD 30 MIN: CPT | Performed by: INTERNAL MEDICINE

## 2018-11-12 PROCEDURE — 93000 ELECTROCARDIOGRAM COMPLETE: CPT | Performed by: INTERNAL MEDICINE

## 2018-11-12 RX ORDER — ATORVASTATIN CALCIUM 40 MG/1
40 TABLET, FILM COATED ORAL DAILY
Qty: 30 TABLET | Refills: 5 | Status: SHIPPED | OUTPATIENT
Start: 2018-11-12 | End: 2019-06-08 | Stop reason: SDUPTHER

## 2018-11-12 NOTE — PROGRESS NOTES
Cardiology Progress Note    ASSESSMENT:    1  History of prior chest pain and noncritical CAD with normal Lexiscan nuclear stress study on 12/5/13  2  Chronic obesity, class I, with 10 4 pound weight gain in approximately 5-1/2 years  3  Stable exertional dyspnea with underlying COPD/emphysema and previously normal sleep study  Recent FEV1 was 34% with obstruction ratio of 44%  4  Controlled essential hypertension  5  Dyslipidemia, suboptimum in view of CAD, with current LDL of 92    6  Chronic insomnia  7  History of acute jejunitis versus omental infarction 5/25/14  8  Mild chronic kidney disease, stage III, with latest estimated GFR 55    Plan       Patient Instructions     1  Discontinue simvastatin when current supply is used up and do not renew it  2  At that point, begin atorvastatin 40 milligrams by mouth once daily with initial prescription for 30 day supply and 5 refills  3  Continue other medications as currently using  4  Cardiology follow-up in 1 year with lipids, CMP, EKG  HPI    This 68 y o  female  denies new cardiopulmonary and medical symptoms  She is now using a nebulizer at least twice a day and sometimes more often  She does not use oxygen during the daytime but is faithful with its use overnight  She is sedentary,  preferring to play [employer] or Michelle Broad on her tablet or cell phone  and to stay in her room at home  She lives there with her daughter and 11year-old granddaughter  The patient was seen by Pulmonary Medicine on 6/11/18 with impression of moderately severe COPD with emphysema  Shet was deemed to appears stable  She was recommended to consider daytime oxygen at 2 liters but prefers not to use it  She has been using a nebulizer at least twice a day and sometimes more often, along with her inhalers        Review of Systems    All other systems negative, except as noted in history of present illness    Historical Information   Past Medical History:   Diagnosis Date    Breast lump     CHF (congestive heart failure) (HCC)     COPD (chronic obstructive pulmonary disease) (HCC)     Diverticulitis     Emphysema of lung (HCC)     History of chronic obstructive lung disease     Non-productive cough     SOBOE (shortness of breath on exertion)      History reviewed  No pertinent surgical history  History   Alcohol Use No     History   Drug Use No     History   Smoking Status    Former Smoker    Packs/day: 1 50    Years: 55 00    Quit date: 6/5/2010   Smokeless Tobacco    Never Used     Comment: ex cigarette smoker       Family History:  Family History   Problem Relation Age of Onset    Stroke Mother     Diabetes Father     Heart disease Father     Emphysema Family     Stroke Family         syndrome    Diabetes Sister          Meds/Allergies     Prior to Admission medications    Medication Sig Start Date End Date Taking?  Authorizing Provider   ADVAIR DISKUS 500-50 MCG/DOSE inhaler INHALE 1 DOSE BY MOUTH EVERY 12 HOURS 11/5/18  Yes MILEY Owen   albuterol (VENTOLIN HFA) 90 mcg/act inhaler Inhale 12/6/16  Yes Historical Provider, MD   aspirin (ASPIR-LOW) 81 mg EC tablet Take 1 tablet by mouth daily 1/23/14  Yes Historical Provider, MD   Calcium 600 MG tablet Take by mouth   Yes Historical Provider, MD   DAILY MULTIPLE VITAMINS PO Take by mouth   Yes Historical Provider, MD   lisinopril (ZESTRIL) 5 mg tablet TAKE ONE TABLET BY MOUTH ONCE DAILY 8/22/18  Yes Lynda Mckeon MD   metoprolol tartrate (LOPRESSOR) 25 mg tablet Take 0 5 tablets (12 5 mg total) by mouth 2 (two) times a day 2/27/18  Yes Willaiarmando Francoing, DO   SPIRIVA HANDIHALER 18 MCG inhalation capsule Place 1 capsule (18 mcg total) into inhaler and inhale daily for 30 days 6/11/18 11/12/18 Yes MILEY Owen   simvastatin (ZOCOR) 20 mg tablet TAKE ONE TABLET BY MOUTH ONCE DAILY 7/25/18 11/12/18 Yes Willaim Ruffing, DO   atorvastatin (LIPITOR) 40 mg tablet Take 1 tablet (40 mg total) by mouth daily 11/12/18 Ely Palmer MD       No Known Allergies      Vitals:    11/12/18 1357   BP: 132/80   BP Location: Left arm   Patient Position: Sitting   Cuff Size: Standard   Pulse: 75   SpO2: 93%   Weight: 82 6 kg (182 lb)   Height: 5' 1 5" (1 562 m)       Body mass index is 33 83 kg/m²  Weight loss of 3 6 lbs in approximately 14 months    Physical Exam:    General Appearance:  Alert, cooperative, no distress, appears stated age and is mildly to moderately obese  Head:  Normocephalic, without obvious abnormality, atraumatic   Eyes:  PERRL, conjunctiva/corneas clear, EOM's intact,   both eyes   Ears:  Normal TM's and external ear canals, both ears   Nose: Nares normal, septum midline, mucosa normal, no drainage or sinus tenderness   Throat: Lips, mucosa, and tongue normal; teeth and gums normal   Neck: Supple, symmetrical, trachea midline, no adenopathy, thyroid: not enlarged, symmetric, no tenderness/mass/nodules, no carotid bruit or JVD   Back:   Symmetric, no curvature, ROM normal, no CVA tenderness   Lungs:   Clear to auscultation bilaterally, respirations unlabored with generally diminished breath sounds   Chest Wall:  No tenderness or deformity   Heart:  Regular rate and rhythm, S1, S2 normal, no murmur, rub or gallop   Abdomen:   Soft, non-tender, bowel sounds active all four quadrants,  no masses, no organomegaly with moderate obesity noted   Extremities: Extremities normal, atraumatic, no cyanosis or edema   Pulses: 2+ and symmetric   Skin: Skin showed normal color, texture, turgor and no rashes or lesions   Lymph nodes: Cervical, supraclavicular, and axillary nodes normal   Neurologic: Normal         Cardiographics    ECG 11/12/2018 :    Low QRS voltage in anterolateral leads  Minor T flattening in leads 1 and aVL  No major change since 09/07/2017    Imaging    Chest X-Ray :  Xr Chest Pa & Lateral    Result Date: 6/11/2018  Impression No acute cardiopulmonary disease   Workstation performed: [id number] Lab Review       Lab Results   Component Value Date    SODIUM 140 08/27/2018    K 3 9 08/27/2018     08/27/2018    CO2 33 (H) 08/27/2018    BUN 11 08/27/2018    CREATININE 1 02 08/27/2018    GLUF 91 08/27/2018    CALCIUM 8 8 08/27/2018    AST 29 08/27/2018    ALT 34 08/27/2018    ALKPHOS 60 08/27/2018    EGFR 55 08/27/2018       Lab Results   Component Value Date    CHOLESTEROL 167 08/27/2018    CHOLESTEROL 144 09/06/2016     Lab Results   Component Value Date    HDL 58 08/27/2018    HDL 61 (H) 09/06/2016       Lab Results   Component Value Date    LDLCALC 92 08/27/2018    LDLCALC 70 09/06/2016     Lab Results   Component Value Date    TRIG 86 08/27/2018    TRIG 64 09/06/2016         Lab Results   Component Value Date    CALCIUM 8 8 08/27/2018    K 3 9 08/27/2018    CO2 33 (H) 08/27/2018     08/27/2018    BUN 11 08/27/2018    CREATININE 1 02 08/27/2018           Belia Ayala MD

## 2018-11-12 NOTE — PATIENT INSTRUCTIONS
1  Discontinue simvastatin when current supply is used up and do not renew it  2  At that point, begin atorvastatin 40 milligrams by mouth once daily with initial prescription for 30 day supply and 5 refills  3  Continue other medications as currently using  4  Cardiology follow-up in 1 year with lipids, CMP, EKG

## 2018-12-10 ENCOUNTER — OFFICE VISIT (OUTPATIENT)
Dept: PULMONOLOGY | Facility: MEDICAL CENTER | Age: 73
End: 2018-12-10
Payer: MEDICARE

## 2018-12-10 VITALS
OXYGEN SATURATION: 90 % | TEMPERATURE: 97.9 F | RESPIRATION RATE: 12 BRPM | SYSTOLIC BLOOD PRESSURE: 128 MMHG | HEART RATE: 68 BPM | BODY MASS INDEX: 33.49 KG/M2 | WEIGHT: 182 LBS | DIASTOLIC BLOOD PRESSURE: 82 MMHG | HEIGHT: 62 IN

## 2018-12-10 DIAGNOSIS — J43.2 CENTRILOBULAR EMPHYSEMA (HCC): Primary | ICD-10-CM

## 2018-12-10 DIAGNOSIS — J96.11 CHRONIC RESPIRATORY FAILURE WITH HYPOXIA (HCC): ICD-10-CM

## 2018-12-10 DIAGNOSIS — Z87.891 FORMER SMOKER: ICD-10-CM

## 2018-12-10 DIAGNOSIS — R06.00 DYSPNEA ON EXERTION: ICD-10-CM

## 2018-12-10 PROCEDURE — 99214 OFFICE O/P EST MOD 30 MIN: CPT | Performed by: NURSE PRACTITIONER

## 2018-12-10 NOTE — PATIENT INSTRUCTIONS
You are now qualified for oxygen when you walk during the day  I have quit contacted a company called [employer] that will be delivering her oxygen as well as a portable concentrator  I have also given you a sample of a new medication called Trelegy  This is an inhaler that you use once a day in the morning  Short of range her mouth after usage  You will not need to use her Advair or year Spiriva with this medication    I have also ordered for you a low-dose radiation CT scan of year chest which detects early signs of lung cancer

## 2018-12-10 NOTE — PROGRESS NOTES
Assessment/Plan:     Problem List Items Addressed This Visit        Respiratory    Centrilobular emphysema ([address] ) - Primary     Elba Engel he has moderately severe COPD  Forced vital capacity was 1 59 L or 58% of predicted, FEV1 was 0 70 L or 34% obstruction ratio is 44%  She is currently on Spiriva 1 puff daily and Advair 500/51 puff b i d  I am going to order for her Trelegy 1 puff daily  Relevant Medications    fluticasone-umeclidinium-vilanterol (TRELEGY ELLIPTA) 100-62 5-25 MCG/INH inhaler    Other Relevant Orders    Home Oxygen with Portability    Chronic respiratory failure with hypoxia (HCC)     Room air oxygen today at rest was 90%  With exertion it went to 78%  Patient is agreeable to supplemental oxygen  It is noted that under last visit she also qualified  Portable oxygen tanks are causing additional adverse medical outcomes in patient's breathing that does not allow for adequate ambulation  Patient will benefit from usage of a POCvia nasal cannula  I will have [facility], evaluate and supply patient with a POC at a setting of continue  O2 saturation on room air at rest 90% O2 saturation on room air on ambulation 78%, O2 saturation on 3L/M on ambulation 96% via nasal cannula  Portable oxygen tanks would cause additional adverse medical outcomes in patients breathing that would not allow for adequate ambulation  Relevant Orders    Home Oxygen with Portability       Other    Dyspnea on exertion     Patient has history of cigarette smoking  She smoked for approximately 35+ years, a pack to a pack and half per day  She quit in 2010  She does have a history of noncritical coronary artery disease and does follow with Cardiology  She also has controlled essential hypertension and dyslipidemia  She is currently on atorvastatin 40 mg daily  She has moderately severe COPD with emphysema  She is now going to use 3 L of supplemental oxygen with ambulation    She did qualify today in order will be sent  Former smoker     Stuart Crawley is a former smoker  He had a chest x-ray in June 2018 that showed no active cardiopulmonary disease  As she was a smoker she is aware that low-dose screening of chest would be of benefit to detect any early signs of cancer  I will order and this will be scheduled  Relevant Orders    CT lung screening program            Return in about 6 months (around 6/10/2019)  All questions are answered to the patient's satisfaction and understanding  She verbalizes understanding  She is encouraged to call with any further questions or concerns  HPI:  Stuart Crawley is a 58-year-old female with centrilobular emphysema  He has moderately severe and PFT done was under last visit  FEV1 was 0 70 L or 34% of predicted, FVC was 1 59 L or 50% obstruction ratio 44%  She does have a history of centrilobular emphysema and is currently on Advair 500/51 puff b i d  And Spiriva 1 puff daily  Portions of the record may have been created with voice recognition software  Occasional wrong word or "sound a like" substitutions may have occurred due to the inherent limitations of voice recognition software  Read the chart carefully and recognize, using context, where substitutions have occurred  Electronically Signed by MILEY Lees    ______________________________________________________________________    Chief Complaint:   Chief Complaint   Patient presents with    Shortness of Breath     upon exertion       Patient ID: Staurt Crawley is a 68 y o  y o  female has a past medical history of Breast lump; CHF (congestive heart failure) ([facility] [address] ); COPD (chronic obstructive pulmonary disease) ([facility] [address] ); Diverticulitis; Emphysema of lung ([facility] [address] ); History of chronic obstructive lung disease; Non-productive cough; and SOBOE (shortness of breath on exertion)  12/10/2018  Patient presents today for follow-up visit  Shortness of Breath   This is a chronic problem   The current episode started more than 1 year ago  The problem occurs daily  The problem has been waxing and waning  The symptoms are aggravated by exercise  The patient has no known risk factors for DVT/PE  She has tried beta agonist inhalers, ipratropium inhalers and rest for the symptoms  The treatment provided mild relief  Her past medical history is significant for chronic lung disease  Review of Systems   Constitutional: Negative  HENT: Negative  Respiratory: Positive for shortness of breath  Endocrine: Negative  Genitourinary: Negative  Musculoskeletal: Negative  Skin: Negative  Allergic/Immunologic: Negative  Neurological: Negative  Hematological: Negative  Psychiatric/Behavioral: Negative  Smoking history: She reports that she quit smoking about 8 years ago  She has a 82 50 pack-year smoking history   She has never used smokeless tobacco     The following portions of the patient's history were reviewed and updated as appropriate: allergies, current medications, past family history, past medical history, past social history, past surgical history and problem list     Immunization History   Administered Date(s) Administered    Influenza Quadrivalent Preservative Free 3 years and older IM 03/25/2014    Influenza Split High Dose Preservative Free IM 12/06/2016    Influenza TIV (IM) 12/17/2012    Pneumococcal Polysaccharide PPV23 12/17/2012    Tdap 12/17/2012     Current Outpatient Prescriptions   Medication Sig Dispense Refill    ADVAIR DISKUS 500-50 MCG/DOSE inhaler INHALE 1 DOSE BY MOUTH EVERY 12 HOURS 60 each 2    albuterol (VENTOLIN HFA) 90 mcg/act inhaler Inhale      aspirin (ASPIR-LOW) 81 mg EC tablet Take 1 tablet by mouth daily      atorvastatin (LIPITOR) 40 mg tablet Take 1 tablet (40 mg total) by mouth daily 30 tablet 5    Calcium 600 MG tablet Take by mouth      DAILY MULTIPLE VITAMINS PO Take by mouth      lisinopril (ZESTRIL) 5 mg tablet TAKE ONE TABLET BY MOUTH ONCE DAILY 90 tablet 2  metoprolol tartrate (LOPRESSOR) 25 mg tablet Take 0 5 tablets (12 5 mg total) by mouth 2 (two) times a day 60 tablet 5    fluticasone-umeclidinium-vilanterol (TRELEGY ELLIPTA) 100-62 5-25 MCG/INH inhaler Inhale 1 puff daily Rinse mouth after use  1 Inhaler 5    SPIRIVA HANDIHALER 18 MCG inhalation capsule Place 1 capsule (18 mcg total) into inhaler and inhale daily for 30 days 30 capsule 5     No current facility-administered medications for this visit  Allergies: Patient has no known allergies  Objective:  Vitals:    12/10/18 0848   BP: 128/82   BP Location: Left arm   Patient Position: Sitting   Cuff Size: Standard   Pulse: 68   Resp: 12   Temp: 97 9 °F (36 6 °C)   TempSrc: Oral   SpO2: 90%   Weight: 82 6 kg (182 lb)   Height: 5' 1 5" (1 562 m)   Oxygen Therapy  SpO2: 90 %    Wt Readings from Last 3 Encounters:   12/10/18 82 6 kg (182 lb)   11/12/18 82 6 kg (182 lb)   06/11/18 83 kg (183 lb)     Body mass index is 33 83 kg/m²  Physical Exam   Constitutional: She is oriented to person, place, and time  She appears well-developed and well-nourished  Central obesity noted   HENT:   Head: Normocephalic and atraumatic  Mallampati 4   Eyes: Pupils are equal, round, and reactive to light  Conjunctivae are normal    Neck: Normal range of motion  Pulmonary/Chest: Effort normal and breath sounds normal    Abdominal: Soft  Musculoskeletal: Normal range of motion  Neurological: She is alert and oriented to person, place, and time  Skin: Skin is warm and dry  Psychiatric: She has a normal mood and affect   Her behavior is normal        Lab Review:   Lab Requisition on 09/19/2018   Component Date Value    Case Report 09/19/2018                      Value:Non-gynecologic Cytology                          Case: [id number]                                  Authorizing Provider:  Stefan Crooks MD      Collected:           09/19/2018 9575              Pathologist:           Maria T Villa MD Received:            09/20/2018 0740              Specimen:    Urine, Voided                                                                              Final Diagnosis 09/19/2018                      Value: This result contains rich text formatting which cannot be displayed here  Grzegorzvesna Meeks Gross Description 09/19/2018                      Value: This result contains rich text formatting which cannot be displayed here   Clinical Information 09/19/2018                      Value:Microhematuria    Additional Information 09/19/2018                      Value: This result contains rich text formatting which cannot be displayed here  Appointment on 08/27/2018   Component Date Value    Cholesterol 08/27/2018 167     Triglycerides 08/27/2018 86     HDL, Direct 08/27/2018 58     LDL Calculated 08/27/2018 92     Non-HDL-Chol (CHOL-HDL) 08/27/2018 109     Sodium 08/27/2018 140     Potassium 08/27/2018 3 9     Chloride 08/27/2018 101     CO2 08/27/2018 33*    ANION GAP 08/27/2018 6     BUN 08/27/2018 11     Creatinine 08/27/2018 1 02     Glucose, Fasting 08/27/2018 91     Calcium 08/27/2018 8 8     AST 08/27/2018 29     ALT 08/27/2018 34     Alkaline Phosphatase 08/27/2018 60     Total Protein 08/27/2018 7 8     Albumin 08/27/2018 3 6     Total Bilirubin 08/27/2018 0 90     eGFR 08/27/2018 55        Diagnostics:  I have personally reviewed pertinent reports  Office Spirometry Results:     ESS:    No results found

## 2018-12-10 NOTE — ASSESSMENT & PLAN NOTE
Kimberlee Camilo is a former smoker  He had a chest x-ray in June 2018 that showed no active cardiopulmonary disease  As she was a smoker she is aware that low-dose screening of chest would be of benefit to detect any early signs of cancer  I will order and this will be scheduled

## 2018-12-10 NOTE — ASSESSMENT & PLAN NOTE
Christi Jackson he has moderately severe COPD  Forced vital capacity was 1 59 L or 58% of predicted, FEV1 was 0 70 L or 34% obstruction ratio is 44%  She is currently on Spiriva 1 puff daily and Advair 500/51 puff b i d  I am going to order for her Trelegy 1 puff daily

## 2018-12-10 NOTE — ASSESSMENT & PLAN NOTE
Patient has history of cigarette smoking  She smoked for approximately 35+ years, a pack to a pack and half per day  She quit in 2010  She does have a history of noncritical coronary artery disease and does follow with Cardiology  She also has controlled essential hypertension and dyslipidemia  She is currently on atorvastatin 40 mg daily  She has moderately severe COPD with emphysema  She is now going to use 3 L of supplemental oxygen with ambulation  She did qualify today in order will be sent

## 2018-12-10 NOTE — ASSESSMENT & PLAN NOTE
Room air oxygen today at rest was 90%  With exertion it went to 78%  Patient is agreeable to supplemental oxygen  It is noted that under last visit she also qualified  Portable oxygen tanks are causing additional adverse medical outcomes in patient's breathing that does not allow for adequate ambulation  Patient will benefit from usage of a POCvia nasal cannula  I will have [facility], evaluate and supply patient with a POC at a setting of continue  O2 saturation on room air at rest 90% O2 saturation on room air on ambulation 78%, O2 saturation on 3L/M on ambulation 96% via nasal cannula  Portable oxygen tanks would cause additional adverse medical outcomes in patients breathing that would not allow for adequate ambulation

## 2018-12-27 ENCOUNTER — APPOINTMENT (EMERGENCY)
Dept: RADIOLOGY | Facility: HOSPITAL | Age: 73
End: 2018-12-27
Payer: MEDICARE

## 2018-12-27 ENCOUNTER — HOSPITAL ENCOUNTER (OUTPATIENT)
Facility: HOSPITAL | Age: 73
Setting detail: OBSERVATION
Discharge: HOME | End: 2018-12-28
Attending: EMERGENCY MEDICINE | Admitting: FAMILY MEDICINE
Payer: MEDICARE

## 2018-12-27 DIAGNOSIS — J44.9 COPD MIXED TYPE (HCC): ICD-10-CM

## 2018-12-27 DIAGNOSIS — J43.2 CENTRILOBULAR EMPHYSEMA (HCC): ICD-10-CM

## 2018-12-27 DIAGNOSIS — R50.9 FEVER: ICD-10-CM

## 2018-12-27 DIAGNOSIS — I47.1 SVT (SUPRAVENTRICULAR TACHYCARDIA) (HCC): Primary | ICD-10-CM

## 2018-12-27 DIAGNOSIS — R07.9 CHEST PAIN, UNSPECIFIED TYPE: ICD-10-CM

## 2018-12-27 DIAGNOSIS — R06.02 SHORTNESS OF BREATH: ICD-10-CM

## 2018-12-27 PROBLEM — R09.89 SYMPTOMS OF UPPER RESPIRATORY INFECTION (URI): Status: ACTIVE | Noted: 2018-12-27

## 2018-12-27 PROBLEM — E78.5 DYSLIPIDEMIA: Status: ACTIVE | Noted: 2018-12-27

## 2018-12-27 PROBLEM — E87.6 HYPOKALEMIA: Status: ACTIVE | Noted: 2018-12-27

## 2018-12-27 PROBLEM — E83.42 HYPOMAGNESEMIA: Status: ACTIVE | Noted: 2018-12-27

## 2018-12-27 LAB
ALBUMIN SERPL BCP-MCNC: 3.5 G/DL (ref 3.5–5)
ALP SERPL-CCNC: 68 U/L (ref 46–116)
ALT SERPL W P-5'-P-CCNC: 32 U/L (ref 12–78)
ANION GAP SERPL CALCULATED.3IONS-SCNC: 8 MMOL/L (ref 4–13)
APTT PPP: 27 SECONDS (ref 24–33)
AST SERPL W P-5'-P-CCNC: 24 U/L (ref 5–45)
BACTERIA UR QL AUTO: ABNORMAL /HPF
BASOPHILS # BLD AUTO: 0.05 THOUSANDS/ΜL (ref 0–0.1)
BASOPHILS NFR BLD AUTO: 1 % (ref 0–1)
BILIRUB SERPL-MCNC: 0.7 MG/DL (ref 0.2–1)
BILIRUB UR QL STRIP: NEGATIVE
BUN SERPL-MCNC: 9 MG/DL (ref 5–25)
CALCIUM SERPL-MCNC: 9.9 MG/DL (ref 8.3–10.1)
CHLORIDE SERPL-SCNC: 98 MMOL/L (ref 100–108)
CLARITY UR: CLEAR
CO2 SERPL-SCNC: 32 MMOL/L (ref 21–32)
COLOR UR: YELLOW
CREAT SERPL-MCNC: 1.04 MG/DL (ref 0.6–1.3)
EOSINOPHIL # BLD AUTO: 0.11 THOUSAND/ΜL (ref 0–0.61)
EOSINOPHIL NFR BLD AUTO: 1 % (ref 0–6)
ERYTHROCYTE [DISTWIDTH] IN BLOOD BY AUTOMATED COUNT: 12 % (ref 11.6–15.1)
FLUAV AG SPEC QL IA: NEGATIVE
FLUAV AG SPEC QL: NORMAL
FLUBV AG SPEC QL IA: NEGATIVE
FLUBV AG SPEC QL: NORMAL
GFR SERPL CREATININE-BSD FRML MDRD: 53 ML/MIN/1.73SQ M
GLUCOSE SERPL-MCNC: 139 MG/DL (ref 65–140)
GLUCOSE UR STRIP-MCNC: NEGATIVE MG/DL
HCT VFR BLD AUTO: 43.5 % (ref 34.8–46.1)
HGB BLD-MCNC: 13.9 G/DL (ref 11.5–15.4)
HGB UR QL STRIP.AUTO: ABNORMAL
IMM GRANULOCYTES # BLD AUTO: 0.02 THOUSAND/UL (ref 0–0.2)
IMM GRANULOCYTES NFR BLD AUTO: 0 % (ref 0–2)
INR PPP: 0.99 (ref 0.86–1.16)
KETONES UR STRIP-MCNC: NEGATIVE MG/DL
LACTATE SERPL-SCNC: 1.8 MMOL/L (ref 0.5–2)
LEUKOCYTE ESTERASE UR QL STRIP: ABNORMAL
LYMPHOCYTES # BLD AUTO: 1.13 THOUSANDS/ΜL (ref 0.6–4.47)
LYMPHOCYTES NFR BLD AUTO: 10 % (ref 14–44)
MAGNESIUM SERPL-MCNC: 1.6 MG/DL (ref 1.6–2.6)
MCH RBC QN AUTO: 32.6 PG (ref 26.8–34.3)
MCHC RBC AUTO-ENTMCNC: 32 G/DL (ref 31.4–37.4)
MCV RBC AUTO: 102 FL (ref 82–98)
MONOCYTES # BLD AUTO: 1.37 THOUSAND/ΜL (ref 0.17–1.22)
MONOCYTES NFR BLD AUTO: 13 % (ref 4–12)
NEUTROPHILS # BLD AUTO: 8.29 THOUSANDS/ΜL (ref 1.85–7.62)
NEUTS SEG NFR BLD AUTO: 75 % (ref 43–75)
NITRITE UR QL STRIP: NEGATIVE
NON-SQ EPI CELLS URNS QL MICRO: ABNORMAL /HPF
NRBC BLD AUTO-RTO: 0 /100 WBCS
PH UR STRIP.AUTO: 8 [PH] (ref 5–9)
PLATELET # BLD AUTO: 212 THOUSANDS/UL (ref 149–390)
PLATELET # BLD AUTO: 241 THOUSANDS/UL (ref 149–390)
PMV BLD AUTO: 11.7 FL (ref 8.9–12.7)
PMV BLD AUTO: 12.4 FL (ref 8.9–12.7)
POTASSIUM SERPL-SCNC: 3.3 MMOL/L (ref 3.5–5.3)
PROCALCITONIN SERPL-MCNC: <0.05 NG/ML
PROT SERPL-MCNC: 7.9 G/DL (ref 6.4–8.2)
PROT UR STRIP-MCNC: NEGATIVE MG/DL
PROTHROMBIN TIME: 10.4 SECONDS (ref 9.4–11.7)
RBC # BLD AUTO: 4.26 MILLION/UL (ref 3.81–5.12)
RBC #/AREA URNS AUTO: ABNORMAL /HPF
RSV B RNA SPEC QL NAA+PROBE: NORMAL
SODIUM SERPL-SCNC: 138 MMOL/L (ref 136–145)
SP GR UR STRIP.AUTO: 1.01 (ref 1–1.03)
TROPONIN I SERPL-MCNC: 0.02 NG/ML
TROPONIN I SERPL-MCNC: 0.02 NG/ML
TROPONIN I SERPL-MCNC: 0.03 NG/ML
TSH SERPL DL<=0.05 MIU/L-ACNC: 0.61 UIU/ML (ref 0.36–3.74)
UROBILINOGEN UR QL STRIP.AUTO: 0.2 E.U./DL
WBC # BLD AUTO: 10.97 THOUSAND/UL (ref 4.31–10.16)
WBC #/AREA URNS AUTO: ABNORMAL /HPF

## 2018-12-27 PROCEDURE — 96360 HYDRATION IV INFUSION INIT: CPT

## 2018-12-27 PROCEDURE — 84484 ASSAY OF TROPONIN QUANT: CPT | Performed by: PHYSICIAN ASSISTANT

## 2018-12-27 PROCEDURE — 80053 COMPREHEN METABOLIC PANEL: CPT | Performed by: PHYSICIAN ASSISTANT

## 2018-12-27 PROCEDURE — 85025 COMPLETE CBC W/AUTO DIFF WBC: CPT | Performed by: PHYSICIAN ASSISTANT

## 2018-12-27 PROCEDURE — 84145 PROCALCITONIN (PCT): CPT | Performed by: PHYSICIAN ASSISTANT

## 2018-12-27 PROCEDURE — 83735 ASSAY OF MAGNESIUM: CPT | Performed by: PHYSICIAN ASSISTANT

## 2018-12-27 PROCEDURE — 85730 THROMBOPLASTIN TIME PARTIAL: CPT | Performed by: PHYSICIAN ASSISTANT

## 2018-12-27 PROCEDURE — 84484 ASSAY OF TROPONIN QUANT: CPT | Performed by: STUDENT IN AN ORGANIZED HEALTH CARE EDUCATION/TRAINING PROGRAM

## 2018-12-27 PROCEDURE — 87081 CULTURE SCREEN ONLY: CPT | Performed by: STUDENT IN AN ORGANIZED HEALTH CARE EDUCATION/TRAINING PROGRAM

## 2018-12-27 PROCEDURE — 1124F ACP DISCUSS-NO DSCNMKR DOCD: CPT | Performed by: FAMILY MEDICINE

## 2018-12-27 PROCEDURE — 83605 ASSAY OF LACTIC ACID: CPT | Performed by: PHYSICIAN ASSISTANT

## 2018-12-27 PROCEDURE — 36415 COLL VENOUS BLD VENIPUNCTURE: CPT | Performed by: PHYSICIAN ASSISTANT

## 2018-12-27 PROCEDURE — 71045 X-RAY EXAM CHEST 1 VIEW: CPT

## 2018-12-27 PROCEDURE — 85049 AUTOMATED PLATELET COUNT: CPT | Performed by: STUDENT IN AN ORGANIZED HEALTH CARE EDUCATION/TRAINING PROGRAM

## 2018-12-27 PROCEDURE — 87086 URINE CULTURE/COLONY COUNT: CPT | Performed by: STUDENT IN AN ORGANIZED HEALTH CARE EDUCATION/TRAINING PROGRAM

## 2018-12-27 PROCEDURE — 99285 EMERGENCY DEPT VISIT HI MDM: CPT

## 2018-12-27 PROCEDURE — 87631 RESP VIRUS 3-5 TARGETS: CPT | Performed by: PHYSICIAN ASSISTANT

## 2018-12-27 PROCEDURE — 81001 URINALYSIS AUTO W/SCOPE: CPT | Performed by: PHYSICIAN ASSISTANT

## 2018-12-27 PROCEDURE — 93005 ELECTROCARDIOGRAM TRACING: CPT

## 2018-12-27 PROCEDURE — 85610 PROTHROMBIN TIME: CPT | Performed by: PHYSICIAN ASSISTANT

## 2018-12-27 PROCEDURE — 84443 ASSAY THYROID STIM HORMONE: CPT | Performed by: PHYSICIAN ASSISTANT

## 2018-12-27 PROCEDURE — 87040 BLOOD CULTURE FOR BACTERIA: CPT | Performed by: PHYSICIAN ASSISTANT

## 2018-12-27 PROCEDURE — 99220 PR INITIAL OBSERVATION CARE/DAY 70 MINUTES: CPT | Performed by: FAMILY MEDICINE

## 2018-12-27 RX ORDER — ACETAMINOPHEN 325 MG/1
650 TABLET ORAL ONCE
Status: COMPLETED | OUTPATIENT
Start: 2018-12-27 | End: 2018-12-27

## 2018-12-27 RX ORDER — ALBUTEROL SULFATE 90 UG/1
1 AEROSOL, METERED RESPIRATORY (INHALATION) EVERY 6 HOURS PRN
Status: DISCONTINUED | OUTPATIENT
Start: 2018-12-27 | End: 2018-12-28 | Stop reason: HOSPADM

## 2018-12-27 RX ORDER — TIOTROPIUM BROMIDE 18 UG/1
18 CAPSULE ORAL; RESPIRATORY (INHALATION) DAILY
Qty: 30 CAPSULE | Refills: 0 | Status: SHIPPED | OUTPATIENT
Start: 2018-12-27 | End: 2019-01-26

## 2018-12-27 RX ORDER — FLUTICASONE FUROATE AND VILANTEROL 200; 25 UG/1; UG/1
1 POWDER RESPIRATORY (INHALATION) DAILY
Status: DISCONTINUED | OUTPATIENT
Start: 2018-12-27 | End: 2018-12-28 | Stop reason: HOSPADM

## 2018-12-27 RX ORDER — LISINOPRIL 5 MG/1
5 TABLET ORAL DAILY
Status: DISCONTINUED | OUTPATIENT
Start: 2018-12-27 | End: 2018-12-28 | Stop reason: HOSPADM

## 2018-12-27 RX ORDER — ASPIRIN 81 MG/1
81 TABLET ORAL DAILY
Status: DISCONTINUED | OUTPATIENT
Start: 2018-12-27 | End: 2018-12-28 | Stop reason: HOSPADM

## 2018-12-27 RX ORDER — ATORVASTATIN CALCIUM 40 MG/1
40 TABLET, FILM COATED ORAL
Status: DISCONTINUED | OUTPATIENT
Start: 2018-12-27 | End: 2018-12-28 | Stop reason: HOSPADM

## 2018-12-27 RX ORDER — ADENOSINE 3 MG/ML
6 INJECTION INTRAVENOUS ONCE
Status: DISCONTINUED | OUTPATIENT
Start: 2018-12-27 | End: 2018-12-27

## 2018-12-27 RX ORDER — MAGNESIUM SULFATE HEPTAHYDRATE 40 MG/ML
2 INJECTION, SOLUTION INTRAVENOUS ONCE
Status: COMPLETED | OUTPATIENT
Start: 2018-12-27 | End: 2018-12-27

## 2018-12-27 RX ORDER — POTASSIUM CHLORIDE 20 MEQ/1
40 TABLET, EXTENDED RELEASE ORAL ONCE
Status: COMPLETED | OUTPATIENT
Start: 2018-12-27 | End: 2018-12-27

## 2018-12-27 RX ORDER — SODIUM CHLORIDE 9 MG/ML
75 INJECTION, SOLUTION INTRAVENOUS CONTINUOUS
Status: DISCONTINUED | OUTPATIENT
Start: 2018-12-27 | End: 2018-12-28 | Stop reason: HOSPADM

## 2018-12-27 RX ORDER — ADENOSINE 3 MG/ML
12 INJECTION INTRAVENOUS ONCE
Status: DISCONTINUED | OUTPATIENT
Start: 2018-12-27 | End: 2018-12-28 | Stop reason: HOSPADM

## 2018-12-27 RX ADMIN — METOPROLOL TARTRATE 25 MG: 25 TABLET ORAL at 13:17

## 2018-12-27 RX ADMIN — ACETAMINOPHEN 650 MG: 325 TABLET, FILM COATED ORAL at 13:17

## 2018-12-27 RX ADMIN — TIOTROPIUM BROMIDE 18 MCG: 18 CAPSULE ORAL; RESPIRATORY (INHALATION) at 18:02

## 2018-12-27 RX ADMIN — LISINOPRIL 5 MG: 5 TABLET ORAL at 16:37

## 2018-12-27 RX ADMIN — METOPROLOL TARTRATE 25 MG: 25 TABLET, FILM COATED ORAL at 22:58

## 2018-12-27 RX ADMIN — ASPIRIN 81 MG: 81 TABLET, COATED ORAL at 16:37

## 2018-12-27 RX ADMIN — ENOXAPARIN SODIUM 40 MG: 40 INJECTION SUBCUTANEOUS at 16:37

## 2018-12-27 RX ADMIN — ATORVASTATIN CALCIUM 40 MG: 40 TABLET, FILM COATED ORAL at 16:37

## 2018-12-27 RX ADMIN — FLUTICASONE FUROATE AND VILANTEROL TRIFENATATE 1 PUFF: 200; 25 POWDER RESPIRATORY (INHALATION) at 18:03

## 2018-12-27 RX ADMIN — SODIUM CHLORIDE 75 ML/HR: 0.9 INJECTION, SOLUTION INTRAVENOUS at 16:26

## 2018-12-27 RX ADMIN — SODIUM CHLORIDE 1000 ML: 0.9 INJECTION, SOLUTION INTRAVENOUS at 12:30

## 2018-12-27 RX ADMIN — POTASSIUM CHLORIDE 40 MEQ: 1500 TABLET, EXTENDED RELEASE ORAL at 16:37

## 2018-12-27 RX ADMIN — MAGNESIUM SULFATE HEPTAHYDRATE 2 G: 40 INJECTION, SOLUTION INTRAVENOUS at 16:26

## 2018-12-27 NOTE — ASSESSMENT & PLAN NOTE
Patient has 2 days duration of rhinorrhea, nasal congestion  - T of 101 F on admission  - Monitor fever curve, tylenol as needed   - Influenza A/B and RSV PCR pending  - Rapid influenza A/B: negative  - Procalcitonin  - Blood culture x 2  - CXR 12/27: no infiltrate, no evidence of heart failure, no pneumothorax or pleural effusion   - Mildly elevated WBC of 10 97 on admission  Continue to monitor daily WBC

## 2018-12-27 NOTE — ASSESSMENT & PLAN NOTE
Patient has COPD and history of dyspnea on exertion  - last PFT performed on 12/10/18 revealed moderately severe obstruction with FVC of 1 59L or 58% predicted, FEV1 0 70L or 34% of predicted obstruction  - Patient use continuous 3 0L oxygen at home   - Continue N C 3 0L to maintain oxygen saturation >92%

## 2018-12-27 NOTE — ED PROVIDER NOTES
History  Chief Complaint   Patient presents with    Chest Pain     Patient here with c/o sudden onset chest pain and SOB  Patient was sitting at home when symptoms started  69 y/o female, h/o CAD/CHF/COPD/HLD, presenting today with sudden onset of chest pain and shortness of breath that began about 2 hours prior to arrival   Has never experienced this before  Presents with an unknown temperature of 101°  States that she has been feeling fine and herself over the past 2 days  She notes some nasal congestion however no cough  Feels as though is her heart is racing  No sick contacts in the home  Has not taken her metoprolol over the past 2 days as she ran out  She has a history of LOGAN  Was recently to her pulmonologist earlier in the month and had some medications which she did not tolerate well as this was making her sleepy during the day  At that time stopped taking her Spiriva and Adviar, and was placed on Trelegy Ellipta and was then change from 3 liters nasal canula at night to 24 hours continuous  Denies nausea, vomiting, abdominal pain, back pain, numbness, paresthesias, weakness, headache, changes in vision, confusion  Prior to Admission Medications   Prescriptions Last Dose Informant Patient Reported? Taking?    Calcium 600 MG tablet 12/27/2018 at Unknown time Self Yes Yes   Sig: Take by mouth   DAILY MULTIPLE VITAMINS PO 12/27/2018 at Unknown time Self Yes Yes   Sig: Take by mouth   SPIRIVA HANDIHALER 18 MCG inhalation capsule   No No   Sig: Place 1 capsule (18 mcg total) into inhaler and inhale daily for 30 days   albuterol (VENTOLIN HFA) 90 mcg/act inhaler 12/27/2018 at Unknown time Self Yes Yes   Sig: Inhale   aspirin (ASPIR-LOW) 81 mg EC tablet 12/27/2018 at Unknown time Self Yes Yes   Sig: Take 1 tablet by mouth daily   atorvastatin (LIPITOR) 40 mg tablet 12/26/2018 at Unknown time Self No Yes   Sig: Take 1 tablet (40 mg total) by mouth daily   fluticasone-salmeterol (ADVAIR DISKUS) 500-50 mcg/dose inhaler   No No   Sig: Inhale 1 puff 2 (two) times a day Rinse mouth after use  fluticasone-umeclidinium-vilanterol (TRELEGY ELLIPTA) 100-62 5-25 MCG/INH inhaler 12/26/2018 at Unknown time  No Yes   Sig: Inhale 1 puff daily Rinse mouth after use  lisinopril (ZESTRIL) 5 mg tablet 12/27/2018 at Unknown time Self No Yes   Sig: TAKE ONE TABLET BY MOUTH ONCE DAILY   metoprolol tartrate (LOPRESSOR) 25 mg tablet 12/27/2018 at Unknown time Self No Yes   Sig: Take 0 5 tablets (12 5 mg total) by mouth 2 (two) times a day      Facility-Administered Medications: None       Past Medical History:   Diagnosis Date    Breast lump     CHF (congestive heart failure) (HCC)     COPD (chronic obstructive pulmonary disease) (HCC)     Diverticulitis     Emphysema of lung (HCC)     History of chronic obstructive lung disease     Non-productive cough     SOBOE (shortness of breath on exertion)        History reviewed  No pertinent surgical history  Family History   Problem Relation Age of Onset    Stroke Mother     Diabetes Father     Heart disease Father     Emphysema Family     Stroke Family         syndrome    Diabetes Sister      I have reviewed and agree with the history as documented  Social History   Substance Use Topics    Smoking status: Former Smoker     Packs/day: 1 50     Years: 55 00     Quit date: 6/5/2010    Smokeless tobacco: Never Used      Comment: ex cigarette smoker    Alcohol use No        Review of Systems   Constitutional: Negative  HENT: Negative  Eyes: Negative  Respiratory: Positive for shortness of breath  Negative for apnea, cough, choking, chest tightness, wheezing and stridor  Cardiovascular: Positive for chest pain  Negative for palpitations and leg swelling  Gastrointestinal: Negative  Genitourinary: Negative  Musculoskeletal: Negative  Skin: Negative  Neurological: Negative      All other systems reviewed and are negative  Physical Exam  Physical Exam   Constitutional: She is oriented to person, place, and time  She appears well-developed and well-nourished  She appears distressed  Patient appears uncomfortable, slightly diaphoretic  HENT:   Head: Normocephalic and atraumatic  Right Ear: External ear normal    Left Ear: External ear normal    Nose: Nose normal    Mouth/Throat: Oropharynx is clear and moist    Eyes: Pupils are equal, round, and reactive to light  Conjunctivae and EOM are normal    Neck: Normal range of motion  Neck supple  Cardiovascular: Normal heart sounds and intact distal pulses  Exam reveals no gallop and no friction rub  No murmur heard  Pulmonary/Chest: Effort normal and breath sounds normal  No respiratory distress  She has no wheezes  She has no rales  She exhibits no tenderness  S PO2 is 96% indicating adequate oxygenation  In no respiratory distress with normal respiratory rate  Abdominal: Soft  Bowel sounds are normal  She exhibits no distension and no mass  There is no tenderness  There is no rebound and no guarding  No hernia  Neurological: She is alert and oriented to person, place, and time  Skin: Skin is warm  Capillary refill takes less than 2 seconds  No rash noted  She is diaphoretic  No erythema  No pallor  Nursing note and vitals reviewed        Vital Signs  ED Triage Vitals   Temperature Pulse Respirations Blood Pressure SpO2   12/27/18 1217 12/27/18 1217 12/27/18 1214 12/27/18 1214 12/27/18 1217   (!) 101 °F (38 3 °C) 87 20 147/67 96 %      Temp Source Heart Rate Source Patient Position - Orthostatic VS BP Location FiO2 (%)   12/27/18 1217 12/27/18 1300 12/27/18 1214 12/27/18 1214 --   Oral Monitor Lying Right arm       Pain Score       12/27/18 1212       5           Vitals:    12/27/18 1300 12/27/18 1317 12/27/18 1330 12/27/18 1345   BP: 139/72 148/69 (!) 118/11 109/56   Pulse: 100 99 94 84   Patient Position - Orthostatic VS:           Visual Acuity      ED Medications  Medications   adenosine (ADENOCARD) injection 12 mg (12 mg Intravenous Not Given 12/27/18 1305)   sodium chloride 0 9 % bolus 1,000 mL (1,000 mL Intravenous New Bag 12/27/18 1230)   acetaminophen (TYLENOL) tablet 650 mg (650 mg Oral Given 12/27/18 1317)   metoprolol tartrate (LOPRESSOR) tablet 25 mg (25 mg Oral Given 12/27/18 1317)       Diagnostic Studies  Results Reviewed     Procedure Component Value Units Date/Time    Urine Microscopic [[id number]]  (Abnormal) Collected:  12/27/18 1316    Lab Status:  Final result Specimen:  Urine from Urine, Other Updated:  12/27/18 1335     RBC, UA 4-10 (A) /hpf      WBC, UA 4-10 (A) /hpf      Epithelial Cells Occasional /hpf      Bacteria, UA Occasional /hpf      Ca Oxalate Sri, UA -- /hpf     TSH, 3rd generation with Free T4 reflex [[id number]]  (Normal) Collected:  12/27/18 1226    Lab Status:  Final result Specimen:  Blood from Arm, Right Updated:  12/27/18 1329     TSH 3RD GENERATON 0 609 uIU/mL     Narrative:         Patients undergoing fluorescein dye angiography may retain small amounts of fluorescein in the body for 48-72 hours post procedure  Samples containing fluorescein can produce falsely depressed TSH values  If the patient had this procedure,a specimen should be resubmitted post fluorescein clearance            The recommended reference ranges for TSH during pregnancy are as follows:  First trimester 0 1 to 2 5 uIU/mL  Second trimester  0 2 to 3 0 uIU/mL  Third trimester 0 3 to 3 0 uIU/m      Magnesium [[id number]]  (Normal) Collected:  12/27/18 1226    Lab Status:  Final result Specimen:  Blood from Arm, Right Updated:  12/27/18 1329     Magnesium 1 6 mg/dL     UA w Reflex to Microscopic w Reflex to Culture [[id number]]  (Abnormal) Collected:  12/27/18 1316    Lab Status:  Final result Specimen:  Urine from Urine, Other Updated:  12/27/18 1325     Color, UA Yellow     Clarity, UA Clear     Specific Gravity, UA 1 015     pH, UA 8 0 Leukocytes, UA Small (A)     Nitrite, UA Negative     Protein, UA Negative mg/dl      Glucose, UA Negative mg/dl      Ketones, UA Negative mg/dl      Urobilinogen, UA 0 2 E U /dl      Bilirubin, UA Negative     Blood, UA Small (A)    Rapid Influenza Screen with Reflex PCR [[id number]]  (Normal) Collected:  12/27/18 1239    Lab Status:  Final result Specimen:  Nasopharyngeal from Nasopharyngeal Swab Updated:  12/27/18 1300     Rapid Influenza A Ag Negative     Rapid Influenza B Ag Negative    INFLUENZA A/B AND RSV, PCR [[id number]] Collected:  12/27/18 1239    Lab Status: In process Specimen:  Nasopharyngeal from Nasopharyngeal Swab Updated:  12/27/18 1300    Lactic acid x2 [[id number]]  (Normal) Collected:  12/27/18 1226    Lab Status:  Final result Specimen:  Blood from Arm, Right Updated:  12/27/18 1300     LACTIC ACID 1 8 mmol/L     Narrative:         Result may be elevated if tourniquet was used during collection  Troponin I [[id number]]  (Normal) Collected:  12/27/18 1227    Lab Status:  Final result Specimen:  Blood from Arm, Right Updated:  12/27/18 1300     Troponin I 0 02 ng/mL     Comprehensive metabolic panel [[id number]]  (Abnormal) Collected:  12/27/18 1226    Lab Status:  Final result Specimen:  Blood from Arm, Right Updated:  12/27/18 1256     Sodium 138 mmol/L      Potassium 3 3 (L) mmol/L      Chloride 98 (L) mmol/L      CO2 32 mmol/L      ANION GAP 8 mmol/L      BUN 9 mg/dL      Creatinine 1 04 mg/dL      Glucose 139 mg/dL      Calcium 9 9 mg/dL      AST 24 U/L      ALT 32 U/L      Alkaline Phosphatase 68 U/L      Total Protein 7 9 g/dL      Albumin 3 5 g/dL      Total Bilirubin 0 70 mg/dL      eGFR 53 ml/min/1 73sq m     Narrative:         National Kidney Disease Education Program recommendations are as follows:  GFR calculation is accurate only with a steady state creatinine  Chronic Kidney disease less than 60 ml/min/1 73 sq  meters  Kidney failure less than 15 ml/min/1 73 sq  meters  Protime-INR [[id number]]  (Normal) Collected:  12/27/18 1226    Lab Status:  Final result Specimen:  Blood from Arm, Right Updated:  12/27/18 1251     Protime 10 4 seconds      INR 0 99    APTT [[id number]]  (Normal) Collected:  12/27/18 1226    Lab Status:  Final result Specimen:  Blood from Arm, Right Updated:  12/27/18 1251     PTT 27 seconds     CBC and differential [[id number]]  (Abnormal) Collected:  12/27/18 1226    Lab Status:  Final result Specimen:  Blood from Arm, Right Updated:  12/27/18 1239     WBC 10 97 (H) Thousand/uL      RBC 4 26 Million/uL      Hemoglobin 13 9 g/dL      Hematocrit 43 5 %       (H) fL      MCH 32 6 pg      MCHC 32 0 g/dL      RDW 12 0 %      MPV 12 4 fL      Platelets 199 Thousands/uL      nRBC 0 /100 WBCs      Neutrophils Relative 75 %      Immat GRANS % 0 %      Lymphocytes Relative 10 (L) %      Monocytes Relative 13 (H) %      Eosinophils Relative 1 %      Basophils Relative 1 %      Neutrophils Absolute 8 29 (H) Thousands/µL      Immature Grans Absolute 0 02 Thousand/uL      Lymphocytes Absolute 1 13 Thousands/µL      Monocytes Absolute 1 37 (H) Thousand/µL      Eosinophils Absolute 0 11 Thousand/µL      Basophils Absolute 0 05 Thousands/µL     Procalcitonin [[id number]] Collected:  12/27/18 1226    Lab Status: In process Specimen:  Blood from Arm, Right Updated:  12/27/18 1237    Blood culture #2 [[id number]] Collected:  12/27/18 1226    Lab Status: In process Specimen:  Blood from Arm, Left Updated:  12/27/18 1237    Blood culture #1 [[id number]] Collected:  12/27/18 1226    Lab Status: In process Specimen:  Blood from Arm, Right Updated:  12/27/18 1236                 XR chest 1 view portable   Final Result by Yaneli Langley MD (12/27 7911)      No acute cardiopulmonary disease              Workstation performed: [id number]                    Procedures  ECG 12 Lead Documentation  Date/Time: 12/27/2018 12:17 PM  Performed by: Addis Jacques  Authorized by: Chichi Saunders     Indications / Diagnosis:  Chest pain, rapid heart rate   ECG reviewed by me, the ED Provider: yes (Dr Ayana Mooney)    Patient location:  ED  Previous ECG:     Comparison to cardiac monitor: Yes    Interpretation:     Interpretation: abnormal    Rate:     ECG rate:  181    ECG rate assessment: tachycardic    Rhythm:     Rhythm: SVT    Ectopy:     Ectopy: none    QRS:     QRS axis:  Normal    QRS intervals:  Normal  Conduction:     Conduction: normal    ST segments:     ST segments:  Non-specific  ECG 12 Lead Documentation  Date/Time: 12/27/2018 12:47 PM  Performed by: Chichi Saunders  Authorized by: Chichi Saunders     Indications / Diagnosis:  Repeat EKG  ECG reviewed by me, the ED Provider: yes (Dr Ayana Mooney)    Patient location:  ED  Previous ECG:     Previous ECG:  Compared to current    Comparison ECG info:  No longer in SVT, now in NSR    Similarity:  Changes noted    Comparison to cardiac monitor: Yes    Interpretation:     Interpretation: normal    Rate:     ECG rate:  101    ECG rate assessment: tachycardic    Rhythm:     Rhythm: sinus rhythm and sinus tachycardia    Ectopy:     Ectopy: none    QRS:     QRS axis:  Normal    QRS intervals:  Normal  Conduction:     Conduction: normal    ST segments:     ST segments:  Normal  T waves:     T waves: normal             Phone Contacts  ED Phone Contact    ED Course  ED Course as of Dec 27 1406   Thu Dec 27, 2018   1346 Paged [phone] [address] attending    1357 Repeated, diastolic was an error Blood Pressure: (!) 118/11         HEART Risk Score      Most Recent Value   History  1 Filed at: 12/27/2018 1319   ECG  0 Filed at: 12/27/2018 1319   Age  2 Filed at: 12/27/2018 1319   Risk Factors  2 Filed at: 12/27/2018 1319   Troponin  0 Filed at: 12/27/2018 1319   Heart Score Risk Calculator   History  1 Filed at: 12/27/2018 1319   ECG  0 Filed at: 12/27/2018 1319   Age  2 Filed at: 12/27/2018 1319   Risk Factors  2 Filed at: 12/27/2018 1319   Troponin  0 Filed at: 12/27/2018 1319   HEART Score  5 Filed at: 12/27/2018 1319   HEART Score  5 Filed at: 12/27/2018 1319                            MDM  Number of Diagnoses or Management Options  Fever:   Shortness of breath:   SVT (supraventricular tachycardia) [facility]):   Diagnosis management comments: Patient arrived to the ED in what seemed to be SVT  Vagal maneuver performed with patient in trendelenburg and with bearing down without success  Patient however shortly after received a flu swab and went down to a normal rate  At this time a repeat EKG was performed and was in normal sinus without ischemic changes  Patient feeling better at this point  Presented febrile however only has nasal congestion without any other symptoms  Suspect viral  Will admit for observation and potential cardiac evaluation of her SVT  Heart score was 5  Patient verbalizes understanding and agrees with the above assessment plan         Amount and/or Complexity of Data Reviewed  Clinical lab tests: ordered and reviewed  Tests in the radiology section of CPT®: reviewed and ordered  Review and summarize past medical records: yes  Discuss the patient with other providers: yes (Patient was also seen and evaluated by Dr Nora Pryor)  Independent visualization of images, tracings, or specimens: yes      CritCare Time    Disposition  Final diagnoses:   SVT (supraventricular tachycardia) ([address] )   Fever   Shortness of breath     Time reflects when diagnosis was documented in both MDM as applicable and the Disposition within this note     Time User Action Codes Description Comment    12/27/2018  1:51 PM Lissy Clement Add [I47 1] SVT (supraventricular tachycardia) ([address] )     12/27/2018  1:51 PM Eligah Clement Add [R50 9] Fever     12/27/2018  1:51 PM Eligah Clement Add [R06 02] Shortness of breath       ED Disposition     ED Disposition Condition Comment    Admit  Case was discussed with Dr Art Buchanan and the patient's admission status was agreed to be Admission Status: observation status to the service of Dr Gabriella Castañeda   Follow-up Information    None         Patient's Medications   Discharge Prescriptions    No medications on file     No discharge procedures on file      ED Provider  Electronically Signed by           Darby Tomlinson PA-C  12/27/18 5617

## 2018-12-27 NOTE — ASSESSMENT & PLAN NOTE
Patient has history of moderately severe COPD  - Last pulmonology f/u on 12/10 revealed forced vital capacity was 1 59 L or 58% of predicted, FEV1 was 0 70L or 34% obstruction ratio was 44%  - Patient was previously on [address] [employer] but she reported tiredness and sleepiness with the medication  She called pulmonologist today and she was switched back to Spiriva 1 puff daily and [id number] puff BID  Patient on continuous oxygen 3 0L at home    - Continue Spiriva and Advair  - Oxygen 3 0L N C to maintain oxygen saturation >92%

## 2018-12-27 NOTE — H&P
H&P- Eva Juan 1945, 68 y o  female MRN: [id number]    Unit/Bed#: [address] [place] [address] Encounter: [id number]    Primary Care Provider: Kristy Villafuerte DO   Date and time admitted to hospital: 12/27/2018 12:08 PM      Assessment:     Patient is a 68year old female with PMHx of CAD, COPD on 3 0L oxygen at home, and dyslipidemia presented with sudden onset of mid-sternal non-radiating chest pain and SOB  Patient will be admitted to inpatient Family Medicine Service under Dr Kandis Arrington with expected length of stay less than 2 midnights  Patient to be discharged home under the care of her daughter  Patient code status: Level 1 - Full Code  * Chest pain   Assessment & Plan    Patient presented with sudden onset mid-sternal chest pain started on 12/17 at 11:30am, squeezing quality, intermittent, non-radiating, pain scale of initial 10/10 to now 5/10 and palpitation   - EKG at ED with rapid ventricular rate of 181  - ED course: metoprolol 25 mg PO x1, IVF bolus 1000mL, tylenol 650 mg PO x1  - Troponin 0 02, trend x 2  - Pt converted back to sinus rhythm with HR of 101 after metoprolol 25mg PO  - Patient has history of CHF but not currently taking any diuretic, no ECHO was performed in the past  Last [facility] Nuclear stress test performed on 12/5/13 was normal   - Chest pain can be 2/2 Viral upper respiratory illness vs  stable angina  - Telemetry monitoring  - Obtain cardiology consult  - Repeat EKG tomorrow AM     Symptoms of upper respiratory infection (URI)   Assessment & Plan    Patient has 2 days duration of rhinorrhea, nasal congestion  - T of 101 F on admission  - Monitor fever curve, tylenol as needed   - Influenza A/B and RSV PCR pending  - Rapid influenza A/B: negative  - Procalcitonin  - Blood culture x 2  - CXR 12/27: no infiltrate, no evidence of heart failure, no pneumothorax or pleural effusion   - Mildly elevated WBC of 10 97 on admission  Continue to monitor daily WBC            Dyspnea on exertion Assessment & Plan    Patient has COPD and history of dyspnea on exertion  - last PFT performed on 12/10/18 revealed moderately severe obstruction with FVC of 1 59L or 58% predicted, FEV1 0 70L or 34% of predicted obstruction  - Patient use continuous 3 0L oxygen at home   - Continue N C 3 0L to maintain oxygen saturation >92%     Centrilobular emphysema ([address] )   Assessment & Plan    Patient has history of moderately severe COPD  - Last pulmonology f/u on 12/10 revealed forced vital capacity was 1 59 L or 58% of predicted, FEV1 was 0 70L or 34% obstruction ratio was 44%  - Patient was previously on Trelegy Ellipta but she reported tiredness and sleepiness with the medication  She called pulmonologist today and she was switched back to Spiriva 1 puff daily and [id number] puff BID  Patient on continuous oxygen 3 0L at home    - Continue Spiriva and Advair  - Oxygen 3 0L N C to maintain oxygen saturation >92%       Essential hypertension   Assessment & Plan    - Continue home medication lisinopril 5mg PO daily and metoprolol 25 mg PO daily     Dyslipidemia   Assessment & Plan    - Last LDL of 92 on 8/27/18  - Continue on home medication Atorvastatin 40 mg PO daily     Stage 3 chronic kidney disease (HCC)   Assessment & Plan    Baseline Cr of 1 04, patient currently at baseline      Hypokalemia   Assessment & Plan    K+ on admission 3 3  - replete with K-dur and continue monitoring BMP with AM lab     Hypomagnesemia   Assessment & Plan    Mg of 1 6 on admission  - replete with Mg and continue monitoring BMP with AM lab       FEN:  - IVF with NS @ 75 cc/hr  - DVT prophylaxis with Lovenox and SCD  - Diet: Cardiac  - Electrolyte replete as needed     VTE Prophylaxis: Enoxaparin (Lovenox)  / sequential compression device   Code Status: Level 1 - Full Code  Discussion with family: none    Anticipated Length of Stay:  Patient will be admitted on an Observation basis with an anticipated length of stay of  Less than 2 midnights  Total Time for Visit, including Counseling / Coordination of Care: 30 minutes  Greater than 50% of this total time spent on direct patient counseling and coordination of care  Chief Complaint:   Chest pain and shortness of breath    History of Present Illness:    Kianna Adhikari is a 68 y o  female who presents with sudden onset of chest pain and shortness of breath began around 11:30am this morning  Chest pain is located at mid-sternum, non-radiating, intermittent, squeezing quality, pain scale of 10/10 initially and now at 5/10  Symptoms associated with SOB and palpitations  She states that after she got that squeezing chest pain, her heart started to race really fast  At the ED patient was found to have SVT with HR of 181  Patient said she ran out of metoprolol for past 2 days and she has not been taking the medication  Metoprolol 25mg PO was given at the ED and patient's HR decreased to 100s  Patient also reports 2 days duration of nasal congestion and rhinorrhea  She denied sore throat, nausea, vomiting, constipation, diarrhea, recent illness, sick contact, dysuria  At the ED, Patient was found to have fever with T 101F  Patient uses continuous 3 0L oxygen at home  Of note, prior to coming to ED, patient called her pulmonologist because she started Trelegy Ellipta and she felt sleepy and tired so she was told to switch back to Spiriva and Advair  ED course: Metoprolol 25 mg PO x1, IVF NS 1000mL bolus, tylenol 650 mg PO x1      Review of Systems:    Review of Systems   Constitutional: Positive for diaphoresis  Negative for chills and fever  HENT: Positive for congestion and rhinorrhea  Negative for ear pain, sinus pain and sore throat  Eyes: Negative for pain and visual disturbance  Respiratory: Positive for chest tightness and shortness of breath  Negative for cough and wheezing  Cardiovascular: Positive for chest pain (midsternal chest pain) and palpitations   Negative for leg swelling  Gastrointestinal: Negative for abdominal pain, constipation, diarrhea and nausea  Genitourinary: Negative for dysuria  Musculoskeletal: Negative for back pain  Neurological: Negative for dizziness and headaches  Psychiatric/Behavioral: Negative for confusion  Past Medical and Surgical History:     Past Medical History:   Diagnosis Date    Breast lump     CHF (congestive heart failure) (HCC)     COPD (chronic obstructive pulmonary disease) (HCC)     Diverticulitis     Emphysema of lung (HCC)     History of chronic obstructive lung disease     Non-productive cough     SOBOE (shortness of breath on exertion)        History reviewed  No pertinent surgical history  Meds/Allergies:    Prior to Admission medications    Medication Sig Start Date End Date Taking? Authorizing Provider   albuterol (VENTOLIN HFA) 90 mcg/act inhaler Inhale 12/6/16  Yes Historical Provider, MD   aspirin (ASPIR-LOW) 81 mg EC tablet Take 1 tablet by mouth daily 1/23/14  Yes Historical Provider, MD   atorvastatin (LIPITOR) 40 mg tablet Take 1 tablet (40 mg total) by mouth daily 11/12/18  Yes Fabrice Lewis MD   Calcium 600 MG tablet Take by mouth   Yes Historical Provider, MD   DAILY MULTIPLE VITAMINS PO Take by mouth   Yes Historical Provider, MD   fluticasone-umeclidinium-vilanterol (TRELEGY ELLIPTA) 100-62 5-25 MCG/INH inhaler Inhale 1 puff daily Rinse mouth after use  12/10/18  Yes MILEY Manzo   lisinopril (ZESTRIL) 5 mg tablet TAKE ONE TABLET BY MOUTH ONCE DAILY 8/22/18  Yes Robert Sanchez MD   metoprolol tartrate (LOPRESSOR) 25 mg tablet Take 0 5 tablets (12 5 mg total) by mouth 2 (two) times a day 2/27/18  Yes Salena Montez,    ADVAIR DISKUS 500-50 MCG/DOSE inhaler INHALE 1 DOSE BY MOUTH EVERY 12 HOURS 11/5/18 12/27/18 Yes MILEY Manzo   fluticasone-salmeterol (ADVAIR DISKUS) 500-50 mcg/dose inhaler Inhale 1 puff 2 (two) times a day Rinse mouth after use   12/27/18   Trev Ma PA-C SPIRIVA HANDIHALER 18 MCG inhalation capsule Place 1 capsule (18 mcg total) into inhaler and inhale daily for 30 days 12/27/18 1/26/19  Rob Monk PA-C   SPIRIVA HANDIHALER 18 MCG inhalation capsule Place 1 capsule (18 mcg total) into inhaler and inhale daily for 30 days 6/11/18 12/27/18  MILEY Lanza     I have reviewed home medications with patient personally  Allergies: No Known Allergies    Social History:     Marital Status:    Patient Pre-hospital Living Situation: Lives with daughter   Patient Pre-hospital Diet Restrictions: none  Substance Use History:   History   Alcohol Use No     History   Smoking Status    Former Smoker    Packs/day: 1 50    Years: 55 00    Quit date: 6/5/2010   Smokeless Tobacco    Never Used     Comment: ex cigarette smoker     History   Drug Use No       Family History:    non-contributory    Physical Exam:     Vitals:   Blood Pressure: 114/59 (12/27/18 1541)  Pulse: 82 (12/27/18 1541)  Temperature: 98 6 °F (37 °C) (12/27/18 1541)  Temp Source: Oral (12/27/18 1432)  Respirations: 20 (12/27/18 1541)  Height: 5' 1" (154 9 cm) (12/27/18 1541)  Weight - Scale: 82 6 kg (182 lb 1 6 oz) (12/27/18 1541)  SpO2: 94 % (12/27/18 1541)    Physical Exam   Constitutional: She is oriented to person, place, and time  No distress  HENT:   Head: Normocephalic  Right Ear: External ear normal    Left Ear: External ear normal    Mouth/Throat: No oropharyngeal exudate  Eyes: Pupils are equal, round, and reactive to light  Right eye exhibits no discharge  Left eye exhibits no discharge  No scleral icterus  Neck: Normal range of motion  Cardiovascular: Normal rate, regular rhythm, normal heart sounds and intact distal pulses  No murmur heard  Pulmonary/Chest: Effort normal  No respiratory distress  She has no wheezes  She exhibits no tenderness  Decreased breath sounds   Abdominal: Soft  Bowel sounds are normal  She exhibits no distension  There is no tenderness  Musculoskeletal: She exhibits no edema or tenderness  Lymphadenopathy:     She has no cervical adenopathy  Neurological: She is alert and oriented to person, place, and time  Skin: Skin is warm and dry  She is not diaphoretic  Psychiatric: She has a normal mood and affect  Her behavior is normal  Judgment and thought content normal          Additional Data:     Lab Results: I have personally reviewed pertinent reports  Results from last 7 days  Lab Units 12/27/18  1226   WBC Thousand/uL 10 97*   HEMOGLOBIN g/dL 13 9   HEMATOCRIT % 43 5   PLATELETS Thousands/uL 241   NEUTROS PCT % 75   LYMPHS PCT % 10*   MONOS PCT % 13*   EOS PCT % 1       Results from last 7 days  Lab Units 12/27/18  1226   SODIUM mmol/L 138   POTASSIUM mmol/L 3 3*   CHLORIDE mmol/L 98*   CO2 mmol/L 32   BUN mg/dL 9   CREATININE mg/dL 1 04   ANION GAP mmol/L 8   CALCIUM mg/dL 9 9   ALBUMIN g/dL 3 5   TOTAL BILIRUBIN mg/dL 0 70   ALK PHOS U/L 68   ALT U/L 32   AST U/L 24   GLUCOSE RANDOM mg/dL 139       Results from last 7 days  Lab Units 12/27/18  1226   INR  0 99               Results from last 7 days  Lab Units 12/27/18  1226   LACTIC ACID mmol/L 1 8       Imaging: I have personally reviewed pertinent reports  XR chest 1 view portable   Final Result by Jonas Michelle MD (12/27 2843)      No acute cardiopulmonary disease  Workstation performed: [id number]             EKG, Pathology, and Other Studies Reviewed on Admission:   · EKG: SVT with HR of 181, after metoprolol was given EKG was repeated with HR improved to 101 bpm    [facility] Reviewed:  Yes

## 2018-12-27 NOTE — ASSESSMENT & PLAN NOTE
Patient presented with sudden onset mid-sternal chest pain started on 12/17 at 11:30am, squeezing quality, intermittent, non-radiating, pain scale of initial 10/10 to now 5/10 and palpitation   - EKG at ED with rapid ventricular rate of 181  - ED course: metoprolol 25 mg PO x1, IVF bolus 1000mL, tylenol 650 mg PO x1  - Troponin 0 02, trend x 2  - Pt converted back to sinus rhythm with HR of 101 after metoprolol 25mg PO  - Patient has history of CHF but not currently taking any diuretic, no ECHO was performed in the past  Last [facility] Nuclear stress test performed on 12/5/13 was normal   - Chest pain can be 2/2 Viral upper respiratory illness vs  stable angina  - Telemetry monitoring  - Obtain cardiology consult  - Repeat EKG tomorrow AM

## 2018-12-28 ENCOUNTER — APPOINTMENT (OUTPATIENT)
Dept: NON INVASIVE DIAGNOSTICS | Facility: HOSPITAL | Age: 73
End: 2018-12-28
Payer: MEDICARE

## 2018-12-28 ENCOUNTER — APPOINTMENT (OUTPATIENT)
Dept: RADIOLOGY | Facility: HOSPITAL | Age: 73
End: 2018-12-28
Payer: MEDICARE

## 2018-12-28 VITALS
HEART RATE: 76 BPM | WEIGHT: 182.1 LBS | SYSTOLIC BLOOD PRESSURE: 129 MMHG | BODY MASS INDEX: 34.38 KG/M2 | HEIGHT: 61 IN | RESPIRATION RATE: 18 BRPM | OXYGEN SATURATION: 95 % | DIASTOLIC BLOOD PRESSURE: 61 MMHG | TEMPERATURE: 99.2 F

## 2018-12-28 PROBLEM — R06.00 DYSPNEA ON EXERTION: Status: RESOLVED | Noted: 2018-06-11 | Resolved: 2018-12-28

## 2018-12-28 PROBLEM — E78.5 DYSLIPIDEMIA: Status: RESOLVED | Noted: 2018-12-27 | Resolved: 2018-12-28

## 2018-12-28 PROBLEM — I47.1 SVT (SUPRAVENTRICULAR TACHYCARDIA) (HCC): Status: RESOLVED | Noted: 2018-12-27 | Resolved: 2018-12-28

## 2018-12-28 PROBLEM — E87.6 HYPOKALEMIA: Status: RESOLVED | Noted: 2018-12-27 | Resolved: 2018-12-28

## 2018-12-28 PROBLEM — I47.10 SVT (SUPRAVENTRICULAR TACHYCARDIA): Status: RESOLVED | Noted: 2018-12-27 | Resolved: 2018-12-28

## 2018-12-28 PROBLEM — I47.1 SVT (SUPRAVENTRICULAR TACHYCARDIA) (HCC): Status: ACTIVE | Noted: 2018-12-27

## 2018-12-28 PROBLEM — E83.42 HYPOMAGNESEMIA: Status: RESOLVED | Noted: 2018-12-27 | Resolved: 2018-12-28

## 2018-12-28 PROBLEM — R06.09 DYSPNEA ON EXERTION: Status: RESOLVED | Noted: 2018-06-11 | Resolved: 2018-12-28

## 2018-12-28 PROBLEM — I47.10 SVT (SUPRAVENTRICULAR TACHYCARDIA): Status: ACTIVE | Noted: 2018-12-27

## 2018-12-28 LAB
ANION GAP SERPL CALCULATED.3IONS-SCNC: 4 MMOL/L (ref 4–13)
ATRIAL RATE: 101 BPM
ATRIAL RATE: 110 BPM
ATRIAL RATE: 67 BPM
BASOPHILS # BLD AUTO: 0.05 THOUSANDS/ΜL (ref 0–0.1)
BASOPHILS NFR BLD AUTO: 1 % (ref 0–1)
BUN SERPL-MCNC: 7 MG/DL (ref 5–25)
CALCIUM SERPL-MCNC: 8.5 MG/DL (ref 8.3–10.1)
CHEST PAIN STATEMENT: NORMAL
CHLORIDE SERPL-SCNC: 105 MMOL/L (ref 100–108)
CHOLEST SERPL-MCNC: 120 MG/DL (ref 50–200)
CO2 SERPL-SCNC: 31 MMOL/L (ref 21–32)
CREAT SERPL-MCNC: 0.93 MG/DL (ref 0.6–1.3)
EOSINOPHIL # BLD AUTO: 0.18 THOUSAND/ΜL (ref 0–0.61)
EOSINOPHIL NFR BLD AUTO: 2 % (ref 0–6)
ERYTHROCYTE [DISTWIDTH] IN BLOOD BY AUTOMATED COUNT: 12.1 % (ref 11.6–15.1)
GFR SERPL CREATININE-BSD FRML MDRD: 61 ML/MIN/1.73SQ M
GLUCOSE P FAST SERPL-MCNC: 93 MG/DL (ref 65–99)
GLUCOSE SERPL-MCNC: 93 MG/DL (ref 65–140)
HCT VFR BLD AUTO: 41.8 % (ref 34.8–46.1)
HDLC SERPL-MCNC: 48 MG/DL (ref 40–60)
HGB BLD-MCNC: 12.8 G/DL (ref 11.5–15.4)
IMM GRANULOCYTES # BLD AUTO: 0.03 THOUSAND/UL (ref 0–0.2)
IMM GRANULOCYTES NFR BLD AUTO: 0 % (ref 0–2)
LDLC SERPL CALC-MCNC: 60 MG/DL (ref 0–100)
LYMPHOCYTES # BLD AUTO: 1.47 THOUSANDS/ΜL (ref 0.6–4.47)
LYMPHOCYTES NFR BLD AUTO: 18 % (ref 14–44)
MAGNESIUM SERPL-MCNC: 2.1 MG/DL (ref 1.6–2.6)
MAX DIASTOLIC BP: 80 MMHG
MAX HEART RATE: 96 BPM
MAX PREDICTED HEART RATE: 147 BPM
MAX. SYSTOLIC BP: 147 MMHG
MCH RBC QN AUTO: 32.2 PG (ref 26.8–34.3)
MCHC RBC AUTO-ENTMCNC: 30.6 G/DL (ref 31.4–37.4)
MCV RBC AUTO: 105 FL (ref 82–98)
MONOCYTES # BLD AUTO: 1.03 THOUSAND/ΜL (ref 0.17–1.22)
MONOCYTES NFR BLD AUTO: 12 % (ref 4–12)
NEUTROPHILS # BLD AUTO: 5.61 THOUSANDS/ΜL (ref 1.85–7.62)
NEUTS SEG NFR BLD AUTO: 67 % (ref 43–75)
NONHDLC SERPL-MCNC: 72 MG/DL
NRBC BLD AUTO-RTO: 0 /100 WBCS
P AXIS: 72 DEGREES
P AXIS: 82 DEGREES
PHOSPHATE SERPL-MCNC: 2.6 MG/DL (ref 2.3–4.1)
PLATELET # BLD AUTO: 216 THOUSANDS/UL (ref 149–390)
PMV BLD AUTO: 12.4 FL (ref 8.9–12.7)
POTASSIUM SERPL-SCNC: 4.4 MMOL/L (ref 3.5–5.3)
PR INTERVAL: 184 MS
PR INTERVAL: 198 MS
PROTOCOL NAME: NORMAL
QRS AXIS: 44 DEGREES
QRS AXIS: 48 DEGREES
QRS AXIS: 60 DEGREES
QRSD INTERVAL: 78 MS
QRSD INTERVAL: 78 MS
QRSD INTERVAL: 82 MS
QT INTERVAL: 256 MS
QT INTERVAL: 322 MS
QT INTERVAL: 390 MS
QTC INTERVAL: 412 MS
QTC INTERVAL: 417 MS
QTC INTERVAL: 444 MS
RBC # BLD AUTO: 3.98 MILLION/UL (ref 3.81–5.12)
REASON FOR TERMINATION: NORMAL
SODIUM SERPL-SCNC: 140 MMOL/L (ref 136–145)
T WAVE AXIS: -68 DEGREES
T WAVE AXIS: 54 DEGREES
T WAVE AXIS: 62 DEGREES
TARGET HR FORMULA: NORMAL
TEST INDICATION: NORMAL
TIME IN EXERCISE PHASE: NORMAL
TRIGL SERPL-MCNC: 58 MG/DL
VENTRICULAR RATE: 101 BPM
VENTRICULAR RATE: 181 BPM
VENTRICULAR RATE: 67 BPM
WBC # BLD AUTO: 8.37 THOUSAND/UL (ref 4.31–10.16)

## 2018-12-28 PROCEDURE — 78452 HT MUSCLE IMAGE SPECT MULT: CPT

## 2018-12-28 PROCEDURE — 99217 PR OBSERVATION CARE DISCHARGE MANAGEMENT: CPT | Performed by: FAMILY MEDICINE

## 2018-12-28 PROCEDURE — 80048 BASIC METABOLIC PNL TOTAL CA: CPT | Performed by: STUDENT IN AN ORGANIZED HEALTH CARE EDUCATION/TRAINING PROGRAM

## 2018-12-28 PROCEDURE — A9502 TC99M TETROFOSMIN: HCPCS

## 2018-12-28 PROCEDURE — 93005 ELECTROCARDIOGRAM TRACING: CPT

## 2018-12-28 PROCEDURE — 84100 ASSAY OF PHOSPHORUS: CPT | Performed by: FAMILY MEDICINE

## 2018-12-28 PROCEDURE — 80061 LIPID PANEL: CPT | Performed by: NURSE PRACTITIONER

## 2018-12-28 PROCEDURE — 93016 CV STRESS TEST SUPVJ ONLY: CPT | Performed by: INTERNAL MEDICINE

## 2018-12-28 PROCEDURE — 78452 HT MUSCLE IMAGE SPECT MULT: CPT | Performed by: INTERNAL MEDICINE

## 2018-12-28 PROCEDURE — 93306 TTE W/DOPPLER COMPLETE: CPT | Performed by: INTERNAL MEDICINE

## 2018-12-28 PROCEDURE — 83735 ASSAY OF MAGNESIUM: CPT | Performed by: FAMILY MEDICINE

## 2018-12-28 PROCEDURE — 85025 COMPLETE CBC W/AUTO DIFF WBC: CPT | Performed by: STUDENT IN AN ORGANIZED HEALTH CARE EDUCATION/TRAINING PROGRAM

## 2018-12-28 PROCEDURE — 93306 TTE W/DOPPLER COMPLETE: CPT

## 2018-12-28 PROCEDURE — 99233 SBSQ HOSP IP/OBS HIGH 50: CPT | Performed by: FAMILY MEDICINE

## 2018-12-28 PROCEDURE — 93010 ELECTROCARDIOGRAM REPORT: CPT | Performed by: INTERNAL MEDICINE

## 2018-12-28 PROCEDURE — 93018 CV STRESS TEST I&R ONLY: CPT | Performed by: INTERNAL MEDICINE

## 2018-12-28 PROCEDURE — 93017 CV STRESS TEST TRACING ONLY: CPT

## 2018-12-28 RX ADMIN — LISINOPRIL 5 MG: 5 TABLET ORAL at 11:38

## 2018-12-28 RX ADMIN — ASPIRIN 81 MG: 81 TABLET, COATED ORAL at 11:37

## 2018-12-28 RX ADMIN — TIOTROPIUM BROMIDE 18 MCG: 18 CAPSULE ORAL; RESPIRATORY (INHALATION) at 11:44

## 2018-12-28 RX ADMIN — ENOXAPARIN SODIUM 40 MG: 40 INJECTION SUBCUTANEOUS at 11:37

## 2018-12-28 RX ADMIN — FLUTICASONE FUROATE AND VILANTEROL TRIFENATATE 1 PUFF: 200; 25 POWDER RESPIRATORY (INHALATION) at 11:38

## 2018-12-28 RX ADMIN — SODIUM CHLORIDE 75 ML/HR: 0.9 INJECTION, SOLUTION INTRAVENOUS at 06:17

## 2018-12-28 RX ADMIN — REGADENOSON 0.4 MG: 0.08 INJECTION, SOLUTION INTRAVENOUS at 10:00

## 2018-12-28 RX ADMIN — METOPROLOL TARTRATE 25 MG: 25 TABLET, FILM COATED ORAL at 11:38

## 2018-12-28 NOTE — ASSESSMENT & PLAN NOTE
Patient presented with sudden onset mid-sternal chest pain started on 12/17 at 11:30am, squeezing quality, intermittent, non-radiating, pain scale of initial 10/10 to now 5/10 and palpitation   - EKG at ED with rapid ventricular rate of 181  - ED course: metoprolol 25 mg PO x1, IVF bolus 1000mL, tylenol 650 mg PO x1  - Troponin 0 02, trend x 2  - Pt converted back to sinus rhythm with HR of 101 after metoprolol 25mg PO  - Patient has history of CHF but not currently taking any diuretic, no ECHO was performed in the past  Last [facility] Nuclear stress test performed on 12/5/13 was normal   - Chest pain can be 2/2 Viral upper respiratory illness vs  stable angina  - Telemetry monitoring  - Obtain cardiology consult  - Repeat EKG tomorrow AM

## 2018-12-28 NOTE — PROGRESS NOTES
[address] [facility] 65 [address] 82 [facility] Practice Progress Note - Sadiq Barnhart 68 y o  female MRN: [id number]    Unit/Bed#: [address] [place] [address] Encounter: [id number]      Assessment/Plan:  * SVT (supraventricular tachycardia) [facility])   Assessment & Plan    Patient presented with sudden onset mid-sternal chest pain started on 12/17 at 11:30am, squeezing quality, intermittent, non-radiating, pain scale of initial 10/10 to now 5/10 and palpitation   - EKG at ED with rapid ventricular rate of 181  - ED course: metoprolol 25 mg PO x1, IVF bolus 1000mL, tylenol 650 mg PO x1  - Troponin 0 02, trend x 2  - Pt converted back to sinus rhythm with HR of 101 after metoprolol 25mg PO  - Patient has history of CHF but not currently taking any diuretic, no ECHO was performed in the past  Last Genevieve Kassie Nuclear stress test performed on 12/5/13 was normal   - Chest pain can be 2/2 Viral upper respiratory illness vs  stable angina  - Telemetry monitoring  - Obtain cardiology consult  - Repeat EKG tomorrow AM     Dyslipidemia   Assessment & Plan    - Last LDL of 92 on 8/27/18  - Lipid panel on 12/20/2018:   / TG 58 / HDL 48 / LDL 60  - Continue on home medication Atorvastatin 40 mg PO daily     Symptoms of upper respiratory infection (URI)   Assessment & Plan    Patient has 2 days duration of rhinorrhea, nasal congestion  - T of 101 F on admission, afebrile through stay  - Monitor fever curve, tylenol as needed   - Influenza A/B and RSV PCR pending  - Rapid influenza A/B: negative  - Procalcitonin neg  - Blood culture x 2  - CXR 12/27: no infiltrate, no evidence of heart failure, no pneumothorax or pleural effusion         Dyspnea on exertion   Assessment & Plan    Patient has COPD and history of dyspnea on exertion  - last PFT performed on 12/10/18 revealed moderately severe obstruction with FVC of 1 59L or 58% predicted, FEV1 0 70L or 34% of predicted obstruction  - Patient use continuous 3 0L oxygen at home   - Continue N C 3 0L to maintain oxygen saturation >92%     Stage 3 chronic kidney disease (HCC)   Assessment & Plan    Baseline Cr of 1 04, patient currently at baseline      Essential hypertension   Assessment & Plan    - Continue home medication lisinopril 5mg PO daily and metoprolol 25 mg PO daily     Centrilobular emphysema ([address] )   Assessment & Plan    Patient has history of moderately severe COPD  - Last pulmonology f/u on 12/10 revealed forced vital capacity was 1 59 L or 58% of predicted, FEV1 was 0 70L or 34% obstruction ratio was 44%  - Patient was previously on [address] but she reported tiredness and sleepiness with the medication  She called pulmonologist today and she was switched back to Spiriva 1 puff daily and [id number] puff BID  Patient on continuous oxygen 3 0L at home  - Continue Spiriva and Advair  - Oxygen 3 0L N C to maintain oxygen saturation >92%       Hypokalemiaresolved as of 12/28/2018   Assessment & Plan    K+ on admission 3 3  - replete with K-dur and continue monitoring BMP with AM lab       Subjective:   Patient seen and examined at bedside  Patient states she feels better today  States she has shortness of breath  Denies chest pain and palpitations  Objective:     Vitals: Blood pressure 120/57, pulse 65, temperature 99 1 °F (37 3 °C), temperature source Tympanic, resp  rate 18, height 5' 1" (1 549 m), weight 82 6 kg (182 lb 1 6 oz), SpO2 95 %, not currently breastfeeding  ,Body mass index is 34 41 kg/m²    Wt Readings from Last 3 Encounters:   12/27/18 82 6 kg (182 lb 1 6 oz)   12/10/18 82 6 kg (182 lb)   11/12/18 82 6 kg (182 lb)       Intake/Output Summary (Last 24 hours) at 12/28/18 0821  Last data filed at 12/28/18 0617   Gross per 24 hour   Intake             2080 ml   Output                0 ml   Net             2080 ml       Physical Exam: /57 (BP Location: Right arm)   Pulse 65   Temp 99 1 °F (37 3 °C) (Tympanic)   Resp 18   Ht 5' 1" (1 549 m)   Wt 82 6 kg (182 lb 1 6 oz)   SpO2 95% Breastfeeding?  No   BMI 34 41 kg/m²   General appearance: alert and oriented, in no acute distress  Throat: moist mucous membranes  Lungs: clear to auscultation bilaterally  Heart: regular rate and rhythm, S1, S2 normal, no murmur, click, rub or gallop  Abdomen: soft, non-tender; bowel sounds normal; no masses,  no organomegaly  Extremities: edema +1  Skin: Skin color, texture, turgor normal  No rashes or lesions  Neurologic: Grossly normal     Recent Results (from the past 24 hour(s))   CBC and differential    Collection Time: 12/27/18 12:26 PM   Result Value Ref Range    WBC 10 97 (H) 4 31 - 10 16 Thousand/uL    RBC 4 26 3 81 - 5 12 Million/uL    Hemoglobin 13 9 11 5 - 15 4 g/dL    Hematocrit 43 5 34 8 - 46 1 %     (H) 82 - 98 fL    MCH 32 6 26 8 - 34 3 pg    MCHC 32 0 31 4 - 37 4 g/dL    RDW 12 0 11 6 - 15 1 %    MPV 12 4 8 9 - 12 7 fL    Platelets 473 026 - 326 Thousands/uL    nRBC 0 /100 WBCs    Neutrophils Relative 75 43 - 75 %    Immat GRANS % 0 0 - 2 %    Lymphocytes Relative 10 (L) 14 - 44 %    Monocytes Relative 13 (H) 4 - 12 %    Eosinophils Relative 1 0 - 6 %    Basophils Relative 1 0 - 1 %    Neutrophils Absolute 8 29 (H) 1 85 - 7 62 Thousands/µL    Immature Grans Absolute 0 02 0 00 - 0 20 Thousand/uL    Lymphocytes Absolute 1 13 0 60 - 4 47 Thousands/µL    Monocytes Absolute 1 37 (H) 0 17 - 1 22 Thousand/µL    Eosinophils Absolute 0 11 0 00 - 0 61 Thousand/µL    Basophils Absolute 0 05 0 00 - 0 10 Thousands/µL   Comprehensive metabolic panel    Collection Time: 12/27/18 12:26 PM   Result Value Ref Range    Sodium 138 136 - 145 mmol/L    Potassium 3 3 (L) 3 5 - 5 3 mmol/L    Chloride 98 (L) 100 - 108 mmol/L    CO2 32 21 - 32 mmol/L    ANION GAP 8 4 - 13 mmol/L    BUN 9 5 - 25 mg/dL    Creatinine 1 04 0 60 - 1 30 mg/dL    Glucose 139 65 - 140 mg/dL    Calcium 9 9 8 3 - 10 1 mg/dL    AST 24 5 - 45 U/L    ALT 32 12 - 78 U/L    Alkaline Phosphatase 68 46 - 116 U/L    Total Protein 7 9 6 4 - 8 2 g/dL Albumin 3 5 3 5 - 5 0 g/dL    Total Bilirubin 0 70 0 20 - 1 00 mg/dL    eGFR 53 ml/min/1 73sq m   Lactic acid x2    Collection Time: 12/27/18 12:26 PM   Result Value Ref Range    LACTIC ACID 1 8 0 5 - 2 0 mmol/L   Procalcitonin    Collection Time: 12/27/18 12:26 PM   Result Value Ref Range    Procalcitonin <0 05 <=0 25 ng/ml   Protime-INR    Collection Time: 12/27/18 12:26 PM   Result Value Ref Range    Protime 10 4 9 4 - 11 7 seconds    INR 0 99 0 86 - 1 16   APTT    Collection Time: 12/27/18 12:26 PM   Result Value Ref Range    PTT 27 24 - 33 seconds   Magnesium    Collection Time: 12/27/18 12:26 PM   Result Value Ref Range    Magnesium 1 6 1 6 - 2 6 mg/dL   TSH, 3rd generation with Free T4 reflex    Collection Time: 12/27/18 12:26 PM   Result Value Ref Range    TSH 3RD GENERATON 0 609 0 358 - 3 740 uIU/mL   Troponin I    Collection Time: 12/27/18 12:27 PM   Result Value Ref Range    Troponin I 0 02 <=0 04 ng/mL   Rapid Influenza Screen with Reflex PCR    Collection Time: 12/27/18 12:39 PM   Result Value Ref Range    Rapid Influenza A Ag Negative Negative, Indeterminate    Rapid Influenza B Ag Negative Negative, Indeterminate   INFLUENZA A/B AND RSV, PCR    Collection Time: 12/27/18 12:39 PM   Result Value Ref Range    INFLU A PCR None Detected None Detected    INFLU B PCR None Detected None Detected    RSV PCR None Detected None Detected   UA w Reflex to Microscopic w Reflex to Culture    Collection Time: 12/27/18  1:16 PM   Result Value Ref Range    Color, UA Yellow     Clarity, UA Clear     Specific [place], UA 1 015 1 000 - 1 030    pH, UA 8 0 5 0 - 9 0    Leukocytes, UA Small (A) Negative    Nitrite, UA Negative Negative    Protein, UA Negative Negative mg/dl    Glucose, UA Negative Negative mg/dl    Ketones, UA Negative Negative mg/dl    Urobilinogen, UA 0 2 0 2, 1 0 E U /dl E U /dl    Bilirubin, UA Negative Negative    Blood, UA Small (A) Negative   Urine Microscopic    Collection Time: 12/27/18  1:16 PM Result Value Ref Range    RBC, UA 4-10 (A) None Seen, 0-5 /hpf    WBC, UA 4-10 (A) None Seen, 0-5, 5-55, 5-65 /hpf    Epithelial Cells Occasional None Seen, Occasional /hpf    Bacteria, UA Occasional None Seen, Occasional /hpf    Ca Oxalate Sri, UA  None Seen /hpf   Platelet count    Collection Time: 12/27/18  7:24 PM   Result Value Ref Range    Platelets 803 637 - 461 Thousands/uL    MPV 11 7 8 9 - 12 7 fL   Troponin I    Collection Time: 12/27/18  7:24 PM   Result Value Ref Range    Troponin I 0 03 <=0 04 ng/mL   Troponin I    Collection Time: 12/27/18 10:48 PM   Result Value Ref Range    Troponin I 0 02 <=0 04 ng/mL   Basic metabolic panel    Collection Time: 12/28/18  6:24 AM   Result Value Ref Range    Sodium 140 136 - 145 mmol/L    Potassium 4 4 3 5 - 5 3 mmol/L    Chloride 105 100 - 108 mmol/L    CO2 31 21 - 32 mmol/L    ANION GAP 4 4 - 13 mmol/L    BUN 7 5 - 25 mg/dL    Creatinine 0 93 0 60 - 1 30 mg/dL    Glucose 93 65 - 140 mg/dL    Glucose, Fasting 93 65 - 99 mg/dL    Calcium 8 5 8 3 - 10 1 mg/dL    eGFR 61 ml/min/1 73sq m   CBC and differential    Collection Time: 12/28/18  6:24 AM   Result Value Ref Range    WBC 8 37 4 31 - 10 16 Thousand/uL    RBC 3 98 3 81 - 5 12 Million/uL    Hemoglobin 12 8 11 5 - 15 4 g/dL    Hematocrit 41 8 34 8 - 46 1 %     (H) 82 - 98 fL    MCH 32 2 26 8 - 34 3 pg    MCHC 30 6 (L) 31 4 - 37 4 g/dL    RDW 12 1 11 6 - 15 1 %    MPV 12 4 8 9 - 12 7 fL    Platelets 178 186 - 906 Thousands/uL    nRBC 0 /100 WBCs    Neutrophils Relative 67 43 - 75 %    Immat GRANS % 0 0 - 2 %    Lymphocytes Relative 18 14 - 44 %    Monocytes Relative 12 4 - 12 %    Eosinophils Relative 2 0 - 6 %    Basophils Relative 1 0 - 1 %    Neutrophils Absolute 5 61 1 85 - 7 62 Thousands/µL    Immature Grans Absolute 0 03 0 00 - 0 20 Thousand/uL    Lymphocytes Absolute 1 47 0 60 - 4 47 Thousands/µL    Monocytes Absolute 1 03 0 17 - 1 22 Thousand/µL    Eosinophils Absolute 0 18 0 00 - 0 61 Thousand/µL Basophils Absolute 0 05 0 00 - 0 10 Thousands/µL   Lipid panel    Collection Time: 12/28/18  6:24 AM   Result Value Ref Range    Cholesterol 120 50 - 200 mg/dL    Triglycerides 58 <=150 mg/dL    HDL, Direct 48 40 - 60 mg/dL    LDL Calculated 60 0 - 100 mg/dL    Non-HDL-Chol (CHOL-HDL) 72 mg/dl   Magnesium    Collection Time: 12/28/18  6:24 AM   Result Value Ref Range    Magnesium 2 1 1 6 - 2 6 mg/dL   Phosphorus    Collection Time: 12/28/18  6:24 AM   Result Value Ref Range    Phosphorus 2 6 2 3 - 4 1 mg/dL       Current Facility-Administered Medications   Medication Dose Route Frequency Provider Last Rate Last Dose    adenosine (ADENOCARD) injection 12 mg  12 mg Intravenous Once Juan R Prince PA-C        albuterol (PROVENTIL HFA,VENTOLIN HFA) inhaler 1 puff  1 puff Inhalation Q6H PRN Luz Arteaga MD        aspirin (ECOTRIN LOW STRENGTH) EC tablet 81 mg  81 mg Oral Daily Luz Arteaga MD   81 mg at 12/27/18 1637    atorvastatin (LIPITOR) tablet 40 mg  40 mg Oral Daily With ANA MARIA WHALEN [facility] Shonda Arteaga MD   40 mg at 12/27/18 1637    enoxaparin (LOVENOX) subcutaneous injection 40 mg  40 mg Subcutaneous Daily Luz Arteaga MD   40 mg at 12/27/18 1637    fluticasone-vilanterol (BREO ELLIPTA) 200-25 MCG/INH inhaler 1 puff  1 puff Inhalation Daily Luz Arteaga MD   1 puff at 12/27/18 1803    influenza vaccine, high-dose (FLUZONE HIGH-DOSE) IM injection CAROLINA 0 5 mL  0 5 mL Intramuscular Prior to discharge Magnolia Cam DO        lisinopril (ZESTRIL) tablet 5 mg  5 mg Oral Daily Luz Arteaga MD   5 mg at 12/27/18 1637    metoprolol tartrate (LOPRESSOR) tablet 25 mg  25 mg Oral BID MILEY Steve   25 mg at 12/27/18 7288    sodium chloride 0 9 % infusion  75 mL/hr Intravenous Continuous Luz Arteaga MD 75 mL/hr at 12/28/18 0617 75 mL/hr at 12/28/18 0617    tiotropium (SPIRIVA) capsule for inhaler 18 mcg  18 mcg Inhalation Daily Luz Arteaga MD   18 mcg at 12/27/18 1802       Invasive Devices     Peripheral Intravenous Line            Peripheral IV 12/27/18 Right Antecubital less than 1 day                Lab, Imaging and other studies: I have personally reviewed pertinent reports      VTE Pharmacologic Prophylaxis: Enoxaparin (Lovenox)  VTE Mechanical Prophylaxis: sequential compression device    Genesis Singleton MD

## 2018-12-28 NOTE — UTILIZATION REVIEW
Initial Clinical Review    Admission: Date/Time/Statement:12/27/18 1352      Orders Placed This Encounter   Procedures    Place in Observation (expected length of stay for this patient is less than two midnights)     Standing Status:   Standing     Number of Occurrences:   1     Order Specific Question:   Admitting Physician     Answer:   Tony Mancini     Order Specific Question:   Level of Care     Answer:   Med Surg [16]         ED: Date/Time/Mode of Arrival:   ED Arrival Information     Expected Arrival Acuity Means of Arrival Escorted By Service Admission Type    - 12/27/2018 12:07 Emergent Ambulance [address] [facility]    Arrival Complaint    chest pain          Chief Complaint:   Chief Complaint   Patient presents with    Chest Pain     Patient here with c/o sudden onset chest pain and SOB  Patient was sitting at home when symptoms started  History of Illness: Galileo Peters is a 68 y o  female who presents with sudden onset of chest pain and shortness of breath began around 11:30am this morning  Chest pain is located at mid-sternum, non-radiating, intermittent, squeezing quality, pain scale of 10/10 initially and now at 5/10  Symptoms associated with SOB and palpitations  She states that after she got that squeezing chest pain, her heart started to race really fast  At the ED patient was found to have SVT with HR of 181  Patient said she ran out of metoprolol for past 2 days and she has not been taking the medication  Metoprolol 25mg PO was given at the ED and patient's HR decreased to 100s  Patient also reports 2 days duration of nasal congestion and rhinorrhea  She denied sore throat, nausea, vomiting, constipation, diarrhea, recent illness, sick contact, dysuria  At the ED, Patient was found to have fever with T 101F  Patient uses continuous 3 0L oxygen at home   Of note, prior to coming to ED, patient called her pulmonologist because she started Oma Bury and she felt sleepy and tired so she was told to switch back to Spiriva and Advair  ED course: Metoprolol 25 mg PO x1, IVF NS 1000mL bolus, tylenol 650 mg PO x1         ED Vital Signs:   ED Triage Vitals   Temperature Pulse Respirations Blood Pressure SpO2   12/27/18 1217 12/27/18 1217 12/27/18 1214 12/27/18 1214 12/27/18 1217   (!) 101 °F (38 3 °C) 87 20 147/67 96 %      Temp Source Heart Rate Source Patient Position - Orthostatic VS BP Location FiO2 (%)   12/27/18 1217 12/27/18 1300 12/27/18 1214 12/27/18 1214 --   Oral Monitor Lying Right arm       Pain Score       12/27/18 1212       5        Wt Readings from Last 1 Encounters:   12/27/18 82 6 kg (182 lb 1 6 oz)       Vital Signs (abnormal):   Abnormal Labs/Diagnostic Test Results: K 3 3>4 4 CL 98>105 TROPNIN 0 02,0 03,0 02 , WBC 10 97>8 37 ,UA SMALL BLOOD SMALL LEUKOCYTES WBC 4-10 OCAS BACTERIA  CXR No acute cardiopulmonary disease    EKG, Pathology, and Other Studies Reviewed on Admission:   EKG: SVT with HR of 181, after metoprolol was given EKG was repeated with HR improved to 101 bpm  ED Treatment:   Medication Administration from 12/27/2018 1207 to 12/27/2018 1510       Date/Time Order Dose Route Action Action by Comments     12/27/2018 1330 sodium chloride 0 9 % bolus 1,000 mL 0 mL Intravenous Stopped Crystalryder Freedman RN      12/27/2018 1230 sodium chloride 0 9 % bolus 1,000 mL 1,000 mL Intravenous New Bag Pattie Freedman RN      12/27/2018 1305 adenosine (ADENOCARD) injection 12 mg 12 mg Intravenous Not Given Rocky Herring RN      12/27/2018 1317 acetaminophen (TYLENOL) tablet 650 mg 650 mg Oral Given Rocky Herring RN Fever     12/27/2018 1317 metoprolol tartrate (LOPRESSOR) tablet 25 mg 25 mg Oral Given Rocky Herring RN           Past Medical/Surgical History:    Active Ambulatory Problems     Diagnosis Date Noted    Centrilobular emphysema ([address] ) 06/05/2018    Essential hypertension 06/05/2018    CAD (coronary artery disease) 06/05/2018    Mixed hyperlipidemia 06/05/2018    Stage 3 chronic kidney disease ([facility] ) 06/05/2018    Chronic respiratory failure with hypoxia ([address] ) 06/11/2018    Dyspnea on exertion 06/11/2018    Former smoker 12/10/2018     Resolved Ambulatory Problems     Diagnosis Date Noted    No Resolved Ambulatory Problems     Past Medical History:   Diagnosis Date    Breast lump     [facility]congestive [facility] failure) [facility]     COPD (chronic obstructive pulmonary disease) (HCC)     Diverticulitis     Emphysema of lung (HCC)     History of chronic obstructive lung disease     Non-productive cough     SOBOE (shortness of breath on exertion)        Admitting Diagnosis: Shortness of breath [R06 02]  SVT (supraventricular tachycardia) ([facility]) [I47 1]  Chest pain [R07 9]  Fever [R50 9]    Age/Sex: 68 y o  female    Assessment/Plan:   Patient is a 68year old female with PMHx of CAD, COPD on 3 0L oxygen at home, and dyslipidemia presented with sudden onset of mid-sternal non-radiating chest pain and SOB  Patient will be admitted to inpatient Family Medicine Service under Dr Marisol Barnard with expected length of stay less than 2 midnights  Patient to be discharged home under the care of her daughter   Patient code status: Level 1 - Full Code      * Chest pain   Assessment & Plan     Patient presented with sudden onset mid-sternal chest pain started on 12/17 at 11:30am, squeezing quality, intermittent, non-radiating, pain scale of initial 10/10 to now 5/10 and palpitation   - EKG at ED with rapid ventricular rate of 181  - ED course: metoprolol 25 mg PO x1, IVF bolus 1000mL, tylenol 650 mg PO x1  - Troponin 0 02, trend x 2  - Pt converted back to sinus rhythm with HR of 101 after metoprolol 25mg PO  - Patient has history of CHF but not currently taking any diuretic, no ECHO was performed in the past  Last Veryl Dears Nuclear stress test performed on 12/5/13 was normal   - Chest pain can be 2/2 Viral upper respiratory illness vs  stable angina  - Telemetry monitoring  - Obtain cardiology consult  - Repeat EKG tomorrow AM      Symptoms of upper respiratory infection (URI)   Assessment & Plan     Patient has 2 days duration of rhinorrhea, nasal congestion  - T of 101 F on admission  - Monitor fever curve, tylenol as needed   - Influenza A/B and RSV PCR pending  - Rapid influenza A/B: negative  - Procalcitonin  - Blood culture x 2  - CXR 12/27: no infiltrate, no evidence of heart failure, no pneumothorax or pleural effusion   - Mildly elevated WBC of 10 97 on admission  Continue to monitor daily WBC  Dyspnea on exertion   Assessment & Plan     Patient has COPD and history of dyspnea on exertion  - last PFT performed on 12/10/18 revealed moderately severe obstruction with FVC of 1 59L or 58% predicted, FEV1 0 70L or 34% of predicted obstruction  - Patient use continuous 3 0L oxygen at home   - Continue N C 3 0L to maintain oxygen saturation >92%      Centrilobular emphysema (HCC)   Assessment & Plan     Patient has history of moderately severe COPD  - Last pulmonology f/u on 12/10 revealed forced vital capacity was 1 59 L or 58% of predicted, FEV1 was 0 70L or 34% obstruction ratio was 44%  - Patient was previously on Trelegy Ellipta but she reported tiredness and sleepiness with the medication  She called pulmonologist today and she was switched back to Spiriva 1 puff daily and [id number] puff BID  Patient on continuous oxygen 3 0L at home    - Continue Spiriva and Advair  - Oxygen 3 0L N C to maintain oxygen saturation >92%   Essential hypertension   Assessment & Plan     - Continue home medication lisinopril 5mg PO daily and metoprolol 25 mg PO daily      Dyslipidemia   Assessment & Plan     - Last LDL of 92 on 8/27/18  - Continue on home medication Atorvastatin 40 mg PO daily      Stage 3 chronic kidney disease (HCC)   Assessment & Plan     Baseline Cr of 1 04, patient currently at baseline       Hypokalemia   Assessment & Plan     K+ on admission 3 3  - replete with K-dur and continue monitoring BMP with AM lab      Hypomagnesemia   Assessment & Plan     Mg of 1 6 on admission  - replete with Mg and continue monitoring BMP with AM lab         FEN:  - IVF with NS @ 75 cc/hr  - DVT prophylaxis with Lovenox and SCD  - Diet: Cardiac  - Electrolyte replete as needed     Admission Orders:  OBSERVATION  TELE MON  ECHO   STRESS TEST  CONSULT CARDIOLOGY  Scheduled Meds:   Current Facility-Administered Medications:  adenosine 12 mg Intravenous Once Jamin Butler PA-C    albuterol 1 puff Inhalation Q6H PRN Luz Back MD    aspirin 81 mg Oral Daily Luz Back MD    atorvastatin 40 mg Oral Daily With Dinner Luz Back MD    enoxaparin 40 mg Subcutaneous Daily Luz Back MD    fluticasone-vilanterol 1 puff Inhalation Daily Luz Back MD    influenza vaccine 0 5 mL Intramuscular Prior to discharge Emy Barron DO    lisinopril 5 mg Oral Daily Luz Back MD    metoprolol tartrate 25 mg Oral BID MILEY Love    sodium chloride 75 mL/hr Intravenous Continuous Luz Back MD Last Rate: 75 mL/hr (12/28/18 0617)   tiotropium 18 mcg Inhalation Daily Luz Back MD      Continuous Infusions:   sodium chloride 75 mL/hr Last Rate: 75 mL/hr (12/28/18 0617)     PRN Meds: albuterol    influenza vaccine

## 2018-12-28 NOTE — ASSESSMENT & PLAN NOTE
Patient has history of moderately severe COPD  - Last pulmonology f/u on 12/10 revealed forced vital capacity was 1 59 L or 58% of predicted, FEV1 was 0 70L or 34% obstruction ratio was 44%  - Patient was previously on [address] [employer] but she reported tiredness and sleepiness with the medication  She called pulmonologist today and she was switched back to Spiriva 1 puff daily and [id number] puff BID  Patient on continuous oxygen 3 0L at home    - Continue Spiriva and Advair  - Oxygen 3 0L N C to maintain oxygen saturation >92%

## 2018-12-28 NOTE — ASSESSMENT & PLAN NOTE
- Last LDL of 92 on 8/27/18  - Lipid panel on 12/20/2018:   / TG 58 / HDL 48 / LDL 60  - Continue on home medication Atorvastatin 40 mg PO daily

## 2018-12-28 NOTE — NURSING NOTE
IV removed, Tele box removed  Patient discharged in stable condition, walking with a steady gait  Discharge instructions reviewed and understood by patient and daughter

## 2018-12-28 NOTE — ASSESSMENT & PLAN NOTE
Patient has 2 days duration of rhinorrhea, nasal congestion  - T of 101 F on admission, afebrile through stay  - Monitor fever curve, tylenol as needed   - Influenza A/B and RSV PCR pending  - Rapid influenza A/B: negative  - Procalcitonin neg  - Blood culture x 2  - CXR 12/27: no infiltrate, no evidence of heart failure, no pneumothorax or pleural effusion

## 2018-12-28 NOTE — PLAN OF CARE
CARDIOVASCULAR - ADULT     Maintains optimal cardiac output and hemodynamic stability Progressing     Absence of cardiac dysrhythmias or at baseline rhythm Progressing        DISCHARGE PLANNING     Discharge to home or other facility with appropriate resources Progressing        INFECTION - ADULT     Absence or prevention of progression during hospitalization Progressing     Absence of fever/infection during neutropenic period Progressing        Knowledge Deficit     Patient/family/caregiver demonstrates understanding of disease process, treatment plan, medications, and discharge instructions Progressing        METABOLIC, FLUID AND ELECTROLYTES - ADULT     Electrolytes maintained within normal limits Progressing     Fluid balance maintained Progressing     Glucose maintained within target range Progressing        PAIN - ADULT     Verbalizes/displays adequate comfort level or baseline comfort level Progressing        RESPIRATORY - ADULT     Achieves optimal ventilation and oxygenation Progressing        SAFETY ADULT     Patient will remain free of falls Progressing     Maintain or return to baseline ADL function Progressing     Maintain or return mobility status to optimal level Progressing

## 2018-12-28 NOTE — DISCHARGE INSTRUCTIONS
Atrial Tachycardia   WHAT YOU SHOULD KNOW:   Atrial tachycardia (AT) is a condition that causes your heart to beat 100 to 300 times each minute  A normal heart rate at rest is 60 to 80 beats each minute  AT develops because of problems with your heart's electrical system  Your atria (top chambers) may send electrical signals that increase your heart rate, or the pathway of the electrical signal may be blocked  Your heart keeps sending signals to try to get past the block  CARE AGREEMENT:   You have the right to help plan your care  Learn about your health condition and how it may be treated  Discuss treatment options with your caregivers to decide what care you want to receive  You always have the right to refuse treatment  RISKS:   · Medicines to treat your AT may cause your heart to beat too slowly or your blood pressure to drop  Certain medicines may cause other types of heartbeat problems  Cardiac ablation can cause you to bleed, bruise, or get an infection where the wire was put in  Even after treatment, your AT and symptoms may return, and you may need more treatments  · Without treatment, your symptoms may get worse  Your heart may not be able to pump enough blood to supply oxygen to the rest of your body  You may get a blood clot The clot can break loose and travel to your lungs or brain  A blood clot can cause you to have a stroke  Your heart may weaken and not work properly  You are also at a higher risk for a heart attack and heart failure  WHILE YOU ARE HERE:   Informed consent  is a legal document that explains the tests, treatments, or procedures that you may need  Informed consent means you understand what will be done and can make decisions about what you want  You give your permission when you sign the consent form  You can have someone sign this form for you if you are not able to sign it  You have the right to understand your medical care in words you know   Before you sign the consent form, understand the risks and benefits of what will be done  Make sure all your questions are answered  An IV  is a small tube placed in your vein that is used to give you medicine or liquids  Heart monitor: This is also called an ECG or EKG  Sticky pads placed on your skin record your heart's electrical activity  Medicines:   · Antiarrhythmias: These help slow your heartbeat and make it more normal      · Beta blockers: These help keep your heartbeat in a regular rhythm  · Calcium channel blockers: These help slow your heartbeat  · Blood thinners: These help prevent blood clots  Clots can lead to stroke, heart attack, and death  Aspirin is a type of blood thinner  You may need to take an aspirin each day to help prevent blood clots  Do not take acetaminophen or ibuprofen instead  Do not take more or less aspirin than healthcare providers say to take  If you are on other blood thinner medicine, ask your healthcare provider before you take aspirin for any reason  · Electrolytes: You may be given electrolytes in the hospital if an electrolyte imbalance caused your AT  Tests:   · Blood tests: You may need blood taken to give caregivers information about how your body is working  The blood may be taken from your hand, arm, or IV  · Electrophysiologic study: An electrophysiologic study is used to chart the electrical pathways in your heart that control your heartbeat  Wires are guided through a blood vessel in your arm, neck, chest, or groin to your heart  Readings are taken through the wires  Your healthcare provider can also use these wires to trigger your heart rhythm problem  · An echocardiogram  is a type of ultrasound  Sound waves are used to show the structure and function of your heart  · ECG:  This is also called an EKG  An ECG is done to check for damage or problems in your heart  A short period of electrical activity in your heart is recorded   Lie as still as possible during the test      · Atrial electrograms:  Atrial electrograms are ECG recordings taken from temporary wires attached to your atria during heart surgery  The wires are connected to a monitor and show your heart's atrial activity  This test is done if healthcare providers believe you are having AT after heart surgery  · Exercise stress test:  An exercise stress test helps healthcare providers see the changes that take place in your heart during exercise  The test is done while you ride an exercise bike or walk on a treadmill  Healthcare providers will ask if you have chest pain or trouble breathing during the test   Treatment:   · Vagal maneuvers:  Vagal maneuvers (methods) can help slow the impulse from your atria to your ventricle and stop your AT  An example of a vagal maneuver is putting your face in ice cold water  Your healthcare provider may teach you other vagal maneuvers to do on your own when you have an episode of AT  · Cardioversion: This is a procedure where an electric shock is given to your heart  The shock is usually given through paddles or sticky patches placed on your chest or back  The shock helps your heart return to a normal beat  Cardioversion may be needed if medicine does not make your heart work better  You may need a cardioversion if your heart rhythm is making you sick or is dangerous  You may be given medicine to help you relax before getting the electric shock  If the shock works, your heart rate and rhythm will return to normal  Medicine may be needed to keep your heart in a normal rhythm  You may need a cardioversion more than once  · Cardiac ablation:  Cardiac ablation is a procedure that uses heat energy to stop abnormal heart impulses  A wire is guided to your heart through an artery or a vein  Your healthcare provider finds the area of the heart that is causing the problems and applies heat energy to it  This may help your heart beat in a more regular rhythm      · Pacemaker: This is a machine that helps your heart beat at a normal speed and in a regular rhythm  If your heart does not beat as it should, the pacemaker sends small electric signals to your heart  You may feel these signals  ¨ Temporary:  Large patches are placed on your chest and back  The patches are connected to a monitor  Your healthcare provider may need to put small wires through your skin and into your heart muscle instead  The wires are connected to a small pacemaker box outside of your body  ¨ Permanent:  A permanent pacemaker is about the size of a wristwatch  It is implanted under the skin of your chest     · Surgery: You may need surgery if other treatments do not work to stop your AT   © 2014 [address] is for End User's use only and may not be sold, redistributed or otherwise used for commercial purposes  All illustrations and images included in CareNotes® are the copyrighted property of [employer] , [employer]  or Florentino Martinez  The above information is an  only  It is not intended as medical advice for individual conditions or treatments  Talk to your doctor, nurse or pharmacist before following any medical regimen to see if it is safe and effective for you

## 2018-12-28 NOTE — DISCHARGE SUMMARY
[facility] Discharge Summary - Medical Rey Arteaga 68 y o  female MRN: [id number]    [address] Room / Bed: [facility] 130-* Encounter: [id number]    BRIEF OVERVIEW  Admitting Provider: Jordin Hoffmann DO  Discharge Provider: Jordin Hoffmann DO    Discharge To: Home, self-care    Outpatient Follow-Up:   CFP:  01/04/2019 at 10:00 a m  With Clay King MD  Things to address at first follow up visit:   Compliance with medications  Echo result  Any recurrence of tachycardia  Labs and results pending at discharge:   Echo  Blood and urine cultures    Admission Date: 12/27/2018     Discharge Date: 12/28/18    Primary Discharge Diagnosis  Principal Problem (Resolved):    SVT (supraventricular tachycardia) (HCC)  Active Problems:    Centrilobular emphysema ([address] )    Essential hypertension    Stage 3 chronic kidney disease ([address] )    Symptoms of upper respiratory infection (URI)  Resolved Problems:    Dyspnea on exertion    Dyslipidemia    Hypokalemia    Hypomagnesemia    Consulting Providers   Bahman Flores DO (cardiology)        [address] STAY    Procedures Performed/Pertinent Test results  XR chest 1 view portable   Final Result      No acute cardiopulmonary disease  Workstation performed: [id number]           Stress test (12/28/2018)  Normal study after pharmacologic stress  Myocardial perfusion imaging was normal at rest and with stress  Left ventricular systolic function was normal     Echo (12/28/2018)  Pending final result    HPI (per H&P)  Rey Arteaga is a 68 y o  female who presents with sudden onset of chest pain and shortness of breath began around 11:30am this morning  Chest pain is located at mid-sternum, non-radiating, intermittent, squeezing quality, pain scale of 10/10 initially and now at 5/10  Symptoms associated with SOB and palpitations   She states that after she got that squeezing chest pain, her heart started to race really fast  At the ED patient was found to have SVT with HR of 181  Patient said she ran out of metoprolol for past 2 days and she has not been taking the medication  Metoprolol 25mg PO was given at the ED and patient's HR decreased to 100s  Patient also reports 2 days duration of nasal congestion and rhinorrhea  She denied sore throat, nausea, vomiting, constipation, diarrhea, recent illness, sick contact, dysuria  At the ED, Patient was found to have fever with T 101F  Patient uses continuous 3 0L oxygen at home  Of note, prior to coming to ED, patient called her pulmonologist because she started Trelegy Ellipta and she felt sleepy and tired so she was told to switch back to [facility]  Hospital Course  Patient admitted due to chest pain and shortness of breath that began yesterday  On arrival heart rate was 178  It decreased to high 90s after metoprolol was given  Patient remained with stable vitals throughout stay  Stress test and echo performed today  Stress test with normal results  Echo results pending  Our evaluation from primary care team, patient is stable enough to be discharged  SVT most likely secondary to patient running out of medication  On discharge form as he was called to make sure medications were refilled  Medications   TAKE these medications     TAKE these medications     Morning Afternoon Evening Bedtime As Needed    ASPIR-LOW 81 mg EC tablet   Generic drug: aspirin   Take 1 tablet by mouth daily   Refills: 0   Next Dose Due: 12/29           atorvastatin 40 mg tablet   Commonly known as: LIPITOR   Take 1 tablet (40 mg total) by mouth daily   Refills: 5   Next Dose Due: 12/28           Calcium 600 MG tablet   Take by mouth   Refills: 0           DAILY MULTIPLE VITAMINS PO   Take by mouth   Refills: 0           fluticasone-salmeterol 500-50 mcg/dose inhaler   Commonly known as: ADVAIR DISKUS   Inhale 1 puff 2 (two) times a day Rinse mouth after use     Refills: 0 Doctor's comments: Please consider 90 day supplies to promote better adherence          lisinopril 5 mg tablet   Commonly known as: ZESTRIL   TAKE ONE TABLET BY MOUTH ONCE DAILY   Refills: 2   Next Dose Due: 12/29           metoprolol tartrate 25 mg tablet   Commonly known as: LOPRESSOR   Take 0 5 tablets (12 5 mg total) by mouth 2 (two) times a day   Refills: 5   Next Dose Due: 12/28            SPIRIVA HANDIHALER 18 mcg inhalation capsule   Generic drug: tiotropium   Place 1 capsule (18 mcg total) into inhaler and inhale daily for 30 days   Refills: 0   Next Dose Due: 12/29           VENTOLIN HFA 90 mcg/act inhaler   Generic drug: albuterol   Inhale   Refills: 0                Allergies  No Known Allergies    Diet restrictions: none  Activity restrictions: as tolerated per patient  Code Status: Level 1 - Full Code    Discharge Condition: stable      Discharge  Statement   I spent 30 minutes discharging the patient  This time was spent on the day of discharge  I had direct contact with the patient on the day of discharge  Additional documentation is required if more than 30 minutes were spent on discharge  Daljit Montes MD

## 2018-12-29 LAB
BACTERIA UR CULT: NORMAL
MRSA NOSE QL CULT: NORMAL

## 2019-01-01 LAB
BACTERIA BLD CULT: NORMAL
BACTERIA BLD CULT: NORMAL

## 2019-01-04 ENCOUNTER — OFFICE VISIT (OUTPATIENT)
Dept: FAMILY MEDICINE CLINIC | Facility: CLINIC | Age: 74
End: 2019-01-04
Payer: MEDICARE

## 2019-01-04 VITALS
TEMPERATURE: 99.4 F | HEART RATE: 68 BPM | WEIGHT: 182.2 LBS | SYSTOLIC BLOOD PRESSURE: 132 MMHG | BODY MASS INDEX: 34.43 KG/M2 | DIASTOLIC BLOOD PRESSURE: 70 MMHG | OXYGEN SATURATION: 94 % | RESPIRATION RATE: 16 BRPM

## 2019-01-04 DIAGNOSIS — R05.8 COUGH WITH SPUTUM: ICD-10-CM

## 2019-01-04 DIAGNOSIS — Z76.89 ENCOUNTER FOR SUPPORT AND COORDINATION OF TRANSITION OF CARE: Primary | ICD-10-CM

## 2019-01-04 PROCEDURE — 99495 TRANSJ CARE MGMT MOD F2F 14D: CPT | Performed by: FAMILY MEDICINE

## 2019-01-04 RX ORDER — BENZONATATE 100 MG/1
100 CAPSULE ORAL 3 TIMES DAILY PRN
Qty: 20 CAPSULE | Refills: 0 | Status: SHIPPED | OUTPATIENT
Start: 2019-01-04 | End: 2019-06-03 | Stop reason: ALTCHOICE

## 2019-01-04 NOTE — PROGRESS NOTES
Assessment/Plan:    Era Grant is a 67 y/o female that is currently afebrile, in no acute distress, and in a pleasant mood  Diagnoses and all orders for this visit:    Encounter for support and coordination of transition of care  -Recommended that she refills her prescription ahead of time and to plan ahead    Cough with sputum  - Recommended drinking a lot fluids and tea with honey  - Also recommended that she use Mucinex OTC and Flonase OTC to clear up some of the nasal congestion and rhinorrhea  -     benzonatate (TESSALON PERLES) 100 mg capsule; Take 1 capsule (100 mg total) by mouth 3 (three) times a day as needed for cough        Era Grant agreed to the plan above  All her questions and concerns were answered and addressed  Case discussed with Dr Jimbo Hay    Subjective:      Patient ID: Megan Velasco is a 68 y o  female  TCM Call (since 12/4/2018)     None      TCM Call (since 12/4/2018)     None          Era Grant is a 67 y/o woman that was seen and examined in the office today with her daughter present for ASHLEIGH  She states that she is feeling better and has had no episodes of SOB, palpitations, chest pain or tachycardia  I reviewed her ECHO results, Urine and blood culture results with her  ECHO shows an EF of 55%, mild tricuspid regurge, and mild mitral regurge  Blood cultures came back negative and Urine cultures showed mixed contaminants  She also stated that she has been compliant with her medications by taking them everyday and making sure they have been filled  Era Grant stated that she has had rhinorrhea, nasal congestion and a cough while she was in the hospital       Cough   This is a new problem  The current episode started in the past 7 days  The problem has been unchanged  The problem occurs every few minutes  The cough is productive of sputum  Associated symptoms include nasal congestion, rhinorrhea and a sore throat   Pertinent negatives include no chest pain, chills, ear congestion, ear pain, fever, headaches, heartburn, hemoptysis, postnasal drip, rash, shortness of breath, sweats, weight loss or wheezing  Nothing aggravates the symptoms  Risk factors for lung disease include smoking/tobacco exposure  She has tried nothing for the symptoms  Her past medical history is significant for bronchitis, COPD and emphysema  There is no history of asthma, environmental allergies or pneumonia  Review of Systems   Constitutional: Negative for chills, diaphoresis, fatigue, fever and weight loss  HENT: Positive for congestion, rhinorrhea and sore throat  Negative for ear pain and postnasal drip  Eyes: Negative  Respiratory: Positive for cough  Negative for hemoptysis, chest tightness, shortness of breath and wheezing  Cardiovascular: Negative for chest pain and palpitations  Gastrointestinal: Negative for abdominal pain, blood in stool, diarrhea, heartburn, nausea and vomiting  Genitourinary: Negative for decreased urine volume, difficulty urinating, dysuria, hematuria, pelvic pain and urgency  Skin: Negative for rash  Allergic/Immunologic: Negative for environmental allergies and food allergies  Neurological: Negative for dizziness, weakness, light-headedness, numbness and headaches  Objective: There were no vitals taken for this visit  Physical Exam   Constitutional: She is oriented to person, place, and time  HENT:   Right Ear: External ear normal    Left Ear: External ear normal    Mouth/Throat: Oropharynx is clear and moist  No oropharyngeal exudate  Bilateral nasal turbinate swelling   Eyes: Pupils are equal, round, and reactive to light  Conjunctivae and EOM are normal  Right eye exhibits no discharge  Left eye exhibits no discharge  No scleral icterus  Neck: Normal range of motion  Neck supple  Cardiovascular: Normal rate, regular rhythm and normal heart sounds  Pulmonary/Chest: Effort normal and breath sounds normal    Abdominal: Soft   Bowel sounds are normal    Neurological: She is alert and oriented to person, place, and time  Nursing note and vitals reviewed

## 2019-01-08 ENCOUNTER — OFFICE VISIT (OUTPATIENT)
Dept: CARDIOLOGY CLINIC | Facility: CLINIC | Age: 74
End: 2019-01-08
Payer: MEDICARE

## 2019-01-08 VITALS
OXYGEN SATURATION: 97 % | WEIGHT: 182 LBS | HEART RATE: 68 BPM | HEIGHT: 61 IN | SYSTOLIC BLOOD PRESSURE: 138 MMHG | DIASTOLIC BLOOD PRESSURE: 72 MMHG | BODY MASS INDEX: 34.36 KG/M2

## 2019-01-08 DIAGNOSIS — I25.10 CAD IN NATIVE ARTERY: Primary | ICD-10-CM

## 2019-01-08 DIAGNOSIS — N18.30 CHRONIC KIDNEY DISEASE, STAGE III (MODERATE) (HCC): ICD-10-CM

## 2019-01-08 DIAGNOSIS — I10 ESSENTIAL HYPERTENSION: ICD-10-CM

## 2019-01-08 DIAGNOSIS — R06.00 DYSPNEA ON EXERTION: ICD-10-CM

## 2019-01-08 DIAGNOSIS — E66.9 CLASS 1 OBESITY: ICD-10-CM

## 2019-01-08 DIAGNOSIS — J43.2 CENTRILOBULAR EMPHYSEMA (HCC): ICD-10-CM

## 2019-01-08 DIAGNOSIS — E78.5 DYSLIPIDEMIA: ICD-10-CM

## 2019-01-08 PROCEDURE — 93000 ELECTROCARDIOGRAM COMPLETE: CPT | Performed by: INTERNAL MEDICINE

## 2019-01-08 PROCEDURE — 99214 OFFICE O/P EST MOD 30 MIN: CPT | Performed by: INTERNAL MEDICINE

## 2019-01-08 NOTE — PATIENT INSTRUCTIONS
1  Continue present medications  2  Patient was advised not to let herself run out of metoprolol tartrate in the future  3  Cardiology follow-up in 3 months with rhythm strip

## 2019-01-08 NOTE — PROGRESS NOTES
Cardiology Progress Note    ASSESSMENT:    1  History of prior chest pain and noncritical CAD with normal Lexiscan nuclear stress study on 12/5/13  2  Chronic obesity, class I, with 10 4 pound weight gain in approximately 5-1/2 years  3  Stable exertional dyspnea with underlying COPD/emphysema and previously normal sleep study  Recent FEV1 was 34% with obstruction ratio of 44%  4  Controlled essential hypertension  5  Dyslipidemia, suboptimum in view of CAD, with current LDL of 92    6  Chronic insomnia  7  History of acute jejunitis versus omental infarction 5/25/14  8  Mild chronic kidney disease, stage III, with latest estimated GFR 55  9  Suppressed PSVT with acute chest discomfort, dyspnea, and palpitations on 12/27/2018, with no recurrence  Plan       Patient Instructions     1  Continue present medications  2  Patient was advised not to let herself run out of metoprolol tartrate in the future  3  Cardiology follow-up in 3 months with rhythm strip  HPI    This 68 y o  female  denies new cardiopulmonary and medical symptoms  She has on nasal oxygen at 3 liters/minute on a continuous basis  Patient was admitted to [facility] from 12/27 through 12/28/2018 with symptomatic PSVT at rate of 178 bpm with associated chest tightness and dyspnea, as well as palpitation  She reportedly had run out of her metoprolol tartrate for 1-2 days prior to admission  She also had symptoms of upper respiratory tract infection and fever  She apparently converted back to sinus rhythm spontaneously  She has had no recurrence of the same symptoms since discharge back home  She has done no smoking for the past 8 years        Review of Systems    All other systems negative, except as noted in history of present illness    Historical Information   Past Medical History:   Diagnosis Date    Breast lump     [facility]     COPD [facility]ch[facility] obstructive [facility] disease) [facility] [facility]     Diverticulitis     Emphysema of lung (HCC)     History of chronic obstructive lung disease     Non-productive cough     SOBOE (shortness of breath on exertion)      History reviewed  No pertinent surgical history  History   Alcohol Use No     History   Drug Use No     History   Smoking Status    Former Smoker    Packs/day: 1 50    Years: 55 00    Quit date: 6/5/2010   Smokeless Tobacco    Never Used     Comment: ex cigarette smoker       Family History:  Family History   Problem Relation Age of Onset    Stroke Mother     Diabetes Father     Heart disease Father     Emphysema Family     Stroke Family         syndrome    Diabetes Sister          Meds/Allergies     Prior to Admission medications    Medication Sig Start Date End Date Taking? Authorizing Provider   albuterol (VENTOLIN HFA) 90 mcg/act inhaler Inhale 12/6/16  Yes Historical Provider, MD   aspirin (ASPIR-LOW) 81 mg EC tablet Take 1 tablet by mouth daily 1/23/14  Yes Historical Provider, MD   atorvastatin (LIPITOR) 40 mg tablet Take 1 tablet (40 mg total) by mouth daily 11/12/18  Yes Austin Ayala MD   Calcium 600 MG tablet Take by mouth   Yes Historical Provider, MD   DAILY MULTIPLE VITAMINS PO Take by mouth   Yes Historical Provider, MD   fluticasone-salmeterol (ADVAIR DISKUS) 500-50 mcg/dose inhaler Inhale 1 puff 2 (two) times a day Rinse mouth after use   12/28/18  Yes Riley Soulier, MD   lisinopril (ZESTRIL) 5 mg tablet TAKE ONE TABLET BY MOUTH ONCE DAILY 8/22/18  Yes Maria Isabel Jefferson MD   metoprolol tartrate (LOPRESSOR) 25 mg tablet Take 0 5 tablets (12 5 mg total) by mouth 2 (two) times a day 2/27/18  Yes Gogo Justin DO   SPIRIVA HANDIHALER 18 MCG inhalation capsule Place 1 capsule (18 mcg total) into inhaler and inhale daily for 30 days 12/27/18 1/26/19 Yes Mateusz Helm PA-C   benzonatate (TESSALON PERLES) 100 mg capsule Take 1 capsule (100 mg total) by mouth 3 (three) times a day as needed for cough  Patient not taking: Reported on 1/8/2019 1/4/19   Sandip Burleson MD       No Known Allergies      Vitals:    01/08/19 1345   BP: 138/72   BP Location: Left arm   Patient Position: Sitting   Cuff Size: Standard   Pulse: 68   SpO2: 97%   Weight: 82 6 kg (182 lb)   Height: 5' 1" (1 549 m)       Body mass index is 34 39 kg/m²  No change in weight in 2 months    Physical Exam:    General Appearance:  Alert, cooperative, no distress, appears stated age and moderately obese   Head:  Normocephalic, without obvious abnormality, atraumatic   Eyes:  PERRL, conjunctiva/corneas clear, EOM's intact,   both eyes   Ears:  Normal TM's and external ear canals, both ears   Nose: Nares normal, septum midline, mucosa normal, no drainage or sinus tenderness   Throat: Lips, mucosa, and tongue normal; teeth and gums normal   Neck: Supple, symmetrical, trachea midline, no adenopathy, thyroid: not enlarged, symmetric, no tenderness/mass/nodules, no carotid bruit or JVD   Back:   Symmetric, no curvature, ROM normal, no CVA tenderness   Lungs:   Clear to auscultation bilaterally, respirations unlabored with diminished breath sounds   Chest Wall:  No tenderness or deformity   Heart:  Regular rate and rhythm, S1, S2 normal, no murmur, rub or gallop with distant heart sounds   Abdomen:   Soft, non-tender, bowel sounds active all four quadrants,  no masses, no organomegaly and moderately obese   Extremities: Extremities normal, atraumatic, no cyanosis or edema   Pulses: 2+ and symmetric   Skin: Skin showed normal color, texture, turgor and no rashes or lesions   Lymph nodes: Cervical, supraclavicular, and axillary nodes normal   Neurologic: Normal         Cardiographics    ECG  01/08/2019:    Low QRS voltage  Normal sinus rhythm  Similar to 11/12/2018    Imaging    Chest X-Ray 12/27/2018:  Xr Chest Pa & Lateral     No acute cardiopulmonary disease    Result Date: 6/11/2018  Impression No acute cardiopulmonary disease   Workstation performed: [id number] Transthoracic echocardiogram 12/28/2018:    55% LVEF  1+ MR/1+ TR    Lexiscan nuclear stress study 12/28/2018:    72% LVEF  Normal myocardial perfusion  Lab Review     TSH level 12/27/2018:  0 609  Lab Results   Component Value Date    SODIUM 140 12/28/2018    K 4 4 12/28/2018     12/28/2018    CO2 31 12/28/2018    BUN 7 12/28/2018    CREATININE 0 93 12/28/2018    GLUF 93 12/28/2018    CALCIUM 8 5 12/28/2018    AST 24 12/27/2018    ALT 32 12/27/2018    ALKPHOS 68 12/27/2018    EGFR 61 12/28/2018       Lab Results   Component Value Date    CHOLESTEROL 120 12/28/2018    CHOLESTEROL 167 08/27/2018    CHOLESTEROL 144 09/06/2016     Lab Results   Component Value Date    HDL 48 12/28/2018    HDL 58 08/27/2018    HDL 61 (H) 09/06/2016     No results found for: LDLCHOLEST  Lab Results   Component Value Date    LDLCALC 60 12/28/2018    LDLCALC 92 08/27/2018    LDLCALC 70 09/06/2016     No components found for: [facility]  Lab Results   Component Value Date    TRIG 58 12/28/2018    TRIG 86 08/27/2018    TRIG 64 09/06/2016         Lab Results   Component Value Date    CALCIUM 8 5 12/28/2018    K 4 4 12/28/2018    CO2 31 12/28/2018     12/28/2018    BUN 7 12/28/2018    CREATININE 0 93 12/28/2018           Linette Carrera MD

## 2019-01-15 ENCOUNTER — TELEPHONE (OUTPATIENT)
Dept: INPATIENT UNIT | Facility: HOSPITAL | Age: 74
End: 2019-01-15

## 2019-01-22 ENCOUNTER — TELEPHONE (OUTPATIENT)
Dept: INPATIENT UNIT | Facility: HOSPITAL | Age: 74
End: 2019-01-22

## 2019-01-24 ENCOUNTER — TELEPHONE (OUTPATIENT)
Dept: INPATIENT UNIT | Facility: HOSPITAL | Age: 74
End: 2019-01-24

## 2019-02-25 DIAGNOSIS — J43.2 CENTRILOBULAR EMPHYSEMA (HCC): ICD-10-CM

## 2019-02-25 DIAGNOSIS — I10 ESSENTIAL HYPERTENSION: ICD-10-CM

## 2019-02-25 RX ORDER — TIOTROPIUM BROMIDE 18 UG/1
CAPSULE ORAL; RESPIRATORY (INHALATION)
Qty: 30 CAPSULE | Refills: 5 | Status: SHIPPED | OUTPATIENT
Start: 2019-02-25 | End: 2019-08-24 | Stop reason: SDUPTHER

## 2019-02-26 DIAGNOSIS — I10 ESSENTIAL HYPERTENSION: ICD-10-CM

## 2019-03-02 DIAGNOSIS — I10 ESSENTIAL HYPERTENSION: ICD-10-CM

## 2019-04-08 ENCOUNTER — OFFICE VISIT (OUTPATIENT)
Dept: CARDIOLOGY CLINIC | Facility: CLINIC | Age: 74
End: 2019-04-08
Payer: MEDICARE

## 2019-04-08 VITALS
WEIGHT: 186 LBS | BODY MASS INDEX: 35.12 KG/M2 | SYSTOLIC BLOOD PRESSURE: 128 MMHG | HEIGHT: 61 IN | HEART RATE: 65 BPM | OXYGEN SATURATION: 97 % | DIASTOLIC BLOOD PRESSURE: 72 MMHG

## 2019-04-08 DIAGNOSIS — R06.00 DYSPNEA ON EXERTION: ICD-10-CM

## 2019-04-08 DIAGNOSIS — J43.2 CENTRILOBULAR EMPHYSEMA (HCC): ICD-10-CM

## 2019-04-08 DIAGNOSIS — N18.30 CHRONIC KIDNEY DISEASE, STAGE III (MODERATE) (HCC): ICD-10-CM

## 2019-04-08 DIAGNOSIS — I10 ESSENTIAL HYPERTENSION: ICD-10-CM

## 2019-04-08 DIAGNOSIS — E78.5 DYSLIPIDEMIA: ICD-10-CM

## 2019-04-08 DIAGNOSIS — I25.10 CAD IN NATIVE ARTERY: Primary | ICD-10-CM

## 2019-04-08 DIAGNOSIS — E66.9 CLASS 1 OBESITY: ICD-10-CM

## 2019-04-08 PROCEDURE — 1124F ACP DISCUSS-NO DSCNMKR DOCD: CPT | Performed by: INTERNAL MEDICINE

## 2019-04-08 PROCEDURE — 93000 ELECTROCARDIOGRAM COMPLETE: CPT | Performed by: INTERNAL MEDICINE

## 2019-04-08 PROCEDURE — 99214 OFFICE O/P EST MOD 30 MIN: CPT | Performed by: INTERNAL MEDICINE

## 2019-05-10 DIAGNOSIS — I10 ESSENTIAL HYPERTENSION: ICD-10-CM

## 2019-05-13 RX ORDER — LISINOPRIL 5 MG/1
TABLET ORAL
Qty: 90 TABLET | Refills: 2 | Status: SHIPPED | OUTPATIENT
Start: 2019-05-13 | End: 2020-02-17

## 2019-06-03 ENCOUNTER — OFFICE VISIT (OUTPATIENT)
Dept: PULMONOLOGY | Facility: MEDICAL CENTER | Age: 74
End: 2019-06-03
Payer: MEDICARE

## 2019-06-03 ENCOUNTER — HOSPITAL ENCOUNTER (OUTPATIENT)
Dept: RADIOLOGY | Facility: HOSPITAL | Age: 74
Discharge: HOME | End: 2019-06-03
Payer: COMMERCIAL

## 2019-06-03 VITALS
HEART RATE: 101 BPM | TEMPERATURE: 98 F | RESPIRATION RATE: 12 BRPM | HEIGHT: 60 IN | WEIGHT: 185 LBS | OXYGEN SATURATION: 82 % | BODY MASS INDEX: 36.32 KG/M2 | DIASTOLIC BLOOD PRESSURE: 62 MMHG | SYSTOLIC BLOOD PRESSURE: 122 MMHG

## 2019-06-03 DIAGNOSIS — J43.2 CENTRILOBULAR EMPHYSEMA (HCC): ICD-10-CM

## 2019-06-03 DIAGNOSIS — J96.11 CHRONIC RESPIRATORY FAILURE WITH HYPOXIA (HCC): ICD-10-CM

## 2019-06-03 DIAGNOSIS — Z87.891 FORMER SMOKER: ICD-10-CM

## 2019-06-03 DIAGNOSIS — R06.02 SOB (SHORTNESS OF BREATH): Primary | ICD-10-CM

## 2019-06-03 PROCEDURE — 94010 BREATHING CAPACITY TEST: CPT | Performed by: NURSE PRACTITIONER

## 2019-06-03 PROCEDURE — 94640 AIRWAY INHALATION TREATMENT: CPT | Performed by: NURSE PRACTITIONER

## 2019-06-03 PROCEDURE — 99214 OFFICE O/P EST MOD 30 MIN: CPT | Performed by: NURSE PRACTITIONER

## 2019-06-03 RX ORDER — ALBUTEROL SULFATE 90 UG/1
2 AEROSOL, METERED RESPIRATORY (INHALATION) 4 TIMES DAILY
Qty: 1 INHALER | Refills: 3 | Status: SHIPPED | OUTPATIENT
Start: 2019-06-03 | End: 2020-03-09 | Stop reason: SDUPTHER

## 2019-06-03 RX ORDER — IPRATROPIUM BROMIDE AND ALBUTEROL SULFATE 2.5; .5 MG/3ML; MG/3ML
3 SOLUTION RESPIRATORY (INHALATION) ONCE
Status: COMPLETED | OUTPATIENT
Start: 2019-06-03 | End: 2019-06-03

## 2019-06-03 RX ADMIN — IPRATROPIUM BROMIDE AND ALBUTEROL SULFATE 3 ML: 2.5; .5 SOLUTION RESPIRATORY (INHALATION) at 09:57

## 2019-06-08 DIAGNOSIS — E78.5 DYSLIPIDEMIA: ICD-10-CM

## 2019-06-08 DIAGNOSIS — I25.10 CAD IN NATIVE ARTERY: ICD-10-CM

## 2019-06-10 ENCOUNTER — TELEPHONE (OUTPATIENT)
Dept: CARDIOLOGY CLINIC | Facility: CLINIC | Age: 74
End: 2019-06-10

## 2019-06-10 RX ORDER — ATORVASTATIN CALCIUM 40 MG/1
TABLET, FILM COATED ORAL
Qty: 30 TABLET | Refills: 5 | Status: SHIPPED | OUTPATIENT
Start: 2019-06-10 | End: 2019-09-19 | Stop reason: SDUPTHER

## 2019-08-24 DIAGNOSIS — J43.2 CENTRILOBULAR EMPHYSEMA (HCC): ICD-10-CM

## 2019-08-26 RX ORDER — TIOTROPIUM BROMIDE 18 UG/1
CAPSULE ORAL; RESPIRATORY (INHALATION)
Qty: 30 CAPSULE | Refills: 5 | Status: SHIPPED | OUTPATIENT
Start: 2019-08-26 | End: 2020-02-17 | Stop reason: SDUPTHER

## 2019-09-11 ENCOUNTER — TRANSCRIBE ORDERS (OUTPATIENT)
Dept: ADMINISTRATIVE | Facility: HOSPITAL | Age: 74
End: 2019-09-11
Payer: MEDICARE

## 2019-09-11 ENCOUNTER — APPOINTMENT (OUTPATIENT)
Dept: LAB | Facility: HOSPITAL | Age: 74
End: 2019-09-11
Attending: UROLOGY
Payer: MEDICARE

## 2019-09-11 DIAGNOSIS — R31.29 MICROSCOPIC HEMATURIA: Primary | ICD-10-CM

## 2019-09-11 PROCEDURE — 88112 CYTOPATH CELL ENHANCE TECH: CPT | Performed by: PATHOLOGY

## 2019-09-19 DIAGNOSIS — I25.10 CAD IN NATIVE ARTERY: ICD-10-CM

## 2019-09-19 DIAGNOSIS — E78.5 DYSLIPIDEMIA: ICD-10-CM

## 2019-09-19 RX ORDER — ATORVASTATIN CALCIUM 40 MG/1
40 TABLET, FILM COATED ORAL DAILY
Qty: 30 TABLET | Refills: 1 | Status: SHIPPED | OUTPATIENT
Start: 2019-09-19 | End: 2019-09-30 | Stop reason: SDUPTHER

## 2019-09-19 NOTE — TELEPHONE ENCOUNTER
She was on the recall list for Oct  She was called 3 times and a letter was sent to her   Will call her again to schedule an appt

## 2019-09-19 NOTE — TELEPHONE ENCOUNTER
I ordered only a 30 day supply with one refill of the atorvastatin 40 milligram, as she will need to be seen before I can give her a 90 day supply with refills  Let her know this with a phone message

## 2019-09-20 NOTE — TELEPHONE ENCOUNTER
Called pt 4 times and left messages to call back and schedule an appt   Also sent a letter (pt was on Oct recall list)

## 2019-09-26 DIAGNOSIS — J44.9 COPD MIXED TYPE (HCC): ICD-10-CM

## 2019-09-30 ENCOUNTER — OFFICE VISIT (OUTPATIENT)
Dept: PULMONOLOGY | Facility: MEDICAL CENTER | Age: 74
End: 2019-09-30
Payer: MEDICARE

## 2019-09-30 VITALS
BODY MASS INDEX: 35.73 KG/M2 | RESPIRATION RATE: 12 BRPM | OXYGEN SATURATION: 99 % | TEMPERATURE: 99.5 F | WEIGHT: 182 LBS | DIASTOLIC BLOOD PRESSURE: 82 MMHG | SYSTOLIC BLOOD PRESSURE: 122 MMHG | HEIGHT: 60 IN

## 2019-09-30 DIAGNOSIS — J20.8 ACUTE BRONCHITIS DUE TO OTHER SPECIFIED ORGANISMS: ICD-10-CM

## 2019-09-30 DIAGNOSIS — J20.9 ACUTE BRONCHITIS, UNSPECIFIED ORGANISM: ICD-10-CM

## 2019-09-30 DIAGNOSIS — E78.5 DYSLIPIDEMIA: ICD-10-CM

## 2019-09-30 DIAGNOSIS — J43.2 CENTRILOBULAR EMPHYSEMA (HCC): Primary | ICD-10-CM

## 2019-09-30 DIAGNOSIS — J44.9 COPD MIXED TYPE (HCC): ICD-10-CM

## 2019-09-30 DIAGNOSIS — I25.10 CAD IN NATIVE ARTERY: ICD-10-CM

## 2019-09-30 DIAGNOSIS — J96.11 CHRONIC RESPIRATORY FAILURE WITH HYPOXIA (HCC): ICD-10-CM

## 2019-09-30 PROCEDURE — 99214 OFFICE O/P EST MOD 30 MIN: CPT | Performed by: NURSE PRACTITIONER

## 2019-09-30 RX ORDER — AZITHROMYCIN 500 MG/1
500 TABLET, FILM COATED ORAL DAILY
Qty: 5 TABLET | Refills: 0 | Status: SHIPPED | OUTPATIENT
Start: 2019-09-30 | End: 2019-10-05

## 2019-09-30 RX ORDER — ALBUTEROL SULFATE 2.5 MG/3ML
2.5 SOLUTION RESPIRATORY (INHALATION) 3 TIMES DAILY
Qty: 270 ML | Refills: 3 | Status: SHIPPED | OUTPATIENT
Start: 2019-09-30 | End: 2019-10-30

## 2019-09-30 RX ORDER — ATORVASTATIN CALCIUM 40 MG/1
40 TABLET, FILM COATED ORAL DAILY
Qty: 90 TABLET | Refills: 3 | Status: SHIPPED | OUTPATIENT
Start: 2019-09-30 | End: 2019-12-09 | Stop reason: SDUPTHER

## 2019-09-30 NOTE — ASSESSMENT & PLAN NOTE
Patient has chronic respiratory failure with hypoxia  She continues to use an benefit with supplemental oxygen  Room air at rest was 91% and with ambulating 120 ft was 80%  With 2 L it was 93% at rest and 85% with 2 laps and 4 L 93% at rest and 89% with ambulation  She continues  To use an benefit  Patient is aware she has moderately severe centrilobular emphysema

## 2019-09-30 NOTE — PATIENT INSTRUCTIONS
Acute Bronchitis   WHAT YOU NEED TO KNOW:   Acute bronchitis is swelling and irritation in the air passages of your lungs  This irritation may cause you to cough or have other breathing problems  Acute bronchitis often starts because of another illness, such as a cold or the flu  The illness spreads from your nose and throat to your windpipe and airways  Bronchitis is often called a chest cold  Acute bronchitis lasts about 3 to 6 weeks and is usually not a serious illness  Your cough can last for several weeks  DISCHARGE INSTRUCTIONS:   Return to the emergency department if:   · You cough up blood  · Your lips or fingernails turn blue  · You feel like you are not getting enough air when you breathe  Contact your healthcare provider if:   · You have a fever  · Your breathing problems do not go away or get worse  · Your cough does not get better within 4 weeks  · You have questions or concerns about your condition or care  Self-care:   · Get more rest   Rest helps your body to heal  Slowly start to do more each day  Rest when you feel it is needed  · Avoid irritants in the air  Avoid chemicals, fumes, and dust  Wear a face mask if you must work around dust or fumes  Stay inside on days when air pollution levels are high  If you have allergies, stay inside when pollen counts are high  Do not use aerosol products, such as spray-on deodorant, bug spray, and hair spray  · Do not smoke or be around others who smoke  Nicotine and other chemicals in cigarettes and cigars damages the cilia that move mucus out of your lungs  Ask your healthcare provider for information if you currently smoke and need help to quit  E-cigarettes or smokeless tobacco still contain nicotine  Talk to your healthcare provider before you use these products  · Drink liquids as directed  Liquids help keep your air passages moist and help you cough up mucus   You may need to drink more liquids when you have acute bronchitis  Ask how much liquid to drink each day and which liquids are best for you  · Use a humidifier or vaporizer  Use a cool mist humidifier or a vaporizer to increase air moisture in your home  This may make it easier for you to breathe and help decrease your cough  Decrease risk for acute bronchitis:   · Get the vaccinations you need  Ask your healthcare provider if you should get vaccinated against the flu or pneumonia  · Prevent the spread of germs  You can decrease your risk of acute bronchitis and other illnesses by doing the following:     [facility] [facility] your hands often with soap and water  Carry germ-killing hand lotion or gel with you  You can use the lotion or gel to clean your hands when soap and water are not available  ¨ Do not touch your eyes, nose, or mouth unless you have washed your hands first     ¨ Always cover your mouth when you cough to prevent the spread of germs  It is best to cough into a tissue or your shirt sleeve instead of into your hand  Ask those around you cover their mouths when they cough  ¨ Try to avoid people who have a cold or the flu  If you are sick, stay away from others as much as possible  Medicines: Your healthcare provider may  give you any of the following:  · Ibuprofen or acetaminophen  are medicines that help lower your fever  They are available without a doctor's order  Ask your healthcare provider which medicine is right for you  Ask how much to take and how often to take it  Follow directions  These medicines can cause stomach bleeding if not taken correctly  Ibuprofen can cause kidney damage  Do not take ibuprofen if you have kidney disease, an ulcer, or allergies to aspirin  Acetaminophen can cause liver damage  Do not take more than 4,000 milligrams in 24 hours  · Decongestants  help loosen mucus in your lungs and make it easier to cough up  This can help you breathe easier  · Cough suppressants  decrease your urge to cough   If your cough produces mucus, do not take a cough suppressant unless your healthcare provider tells you to  Your healthcare provider may suggest that you take a cough suppressant at night so you can rest     · Inhalers  may be given  Your healthcare provider may give you one or more inhalers to help you breathe easier and cough less  An inhaler gives your medicine to open your airways  Ask your healthcare provider to show you how to use your inhaler correctly  · Take your medicine as directed  Contact your healthcare provider if you think your medicine is not helping or if you have side effects  Tell him of her if you are allergic to any medicine  Keep a list of the medicines, vitamins, and herbs you take  Include the amounts, and when and why you take them  Bring the list or the pill bottles to follow-up visits  Carry your medicine list with you in case of an emergency  Follow up with your healthcare provider as directed:  Write down questions you have so you will remember to ask them during your follow-up visits  © 2017 [employer] Information is for End User's use only and may not be sold, redistributed or otherwise used for commercial purposes  All illustrations and images included in CareNotes® are the copyrighted property of [employer]  or Florentino Martinez  The above information is an  only  It is not intended as medical advice for individual conditions or treatments  Talk to your doctor, nurse or pharmacist before following any medical regimen to see if it is safe and effective for you

## 2019-09-30 NOTE — ASSESSMENT & PLAN NOTE
Patient has productive cough  This started approximately a week ago  Because of her moderately severe centrilobular emphysema I am going to treat her as an acute bronchitis  I will order it azithromycin for her 500 mg for 5 days  I am also going to give her Tessalon Perles  She does not have any audible wheezing at this point  If symptoms persist she will contact this office

## 2019-09-30 NOTE — PROGRESS NOTES
Assessment/Plan:     Problem List Items Addressed This Visit        Respiratory    Centrilobular emphysema ([address] ) - Primary     Patient has moderately severe centrilobular emphysema  I did review CT of chest with patient personally  Images showed moderately severe emphysematous changes  These are chronic  There was no evidence of lung nodules  Last PFT was done in June of 2019  Forced vital capacity is 1 60 L or 67% of predicted, FEV1 is 0 7 L or 39% of predicted obstruction ratio 44%  In June 2018 FVC was 1 59 L or 58% of predicted FEV1 was 0 7 L or 34% of predicted  There was no improvement post  Bronchodilation  Patient is stable  She remains on her maintenance inhalers  These include Advair Diskus 500/51 puff b i d  And Spiriva 1 puff daily  Relevant Medications    fluticasone-salmeterol (ADVAIR DISKUS) 500-50 mcg/dose inhaler    albuterol (2 5 mg/3 mL) 0 083 % nebulizer solution    Chronic respiratory failure with hypoxia (HCC)     Patient has chronic respiratory failure with hypoxia  She continues to use an benefit with supplemental oxygen  Room air at rest was 91% and with ambulating 120 ft was 80%  With 2 L it was 93% at rest and 85% with 2 laps and 4 L 93% at rest and 89% with ambulation  She continues  To use an benefit  Patient is aware she has moderately severe centrilobular emphysema  Acute bronchitis due to other specified organisms     Patient has productive cough  This started approximately a week ago  Because of her moderately severe centrilobular emphysema I am going to treat her as an acute bronchitis  I will order it azithromycin for her 500 mg for 5 days  I am also going to give her Tessalon Perles  She does not have any audible wheezing at this point  If symptoms persist she will contact this office           Relevant Medications    fluticasone-salmeterol (ADVAIR DISKUS) 500-50 mcg/dose inhaler    albuterol (2 5 mg/3 mL) 0 083 % nebulizer solution azithromycin (ZITHROMAX) 500 MG tablet      Other Visit Diagnoses     COPD mixed type ([address] )        Relevant Medications    fluticasone-salmeterol (ADVAIR DISKUS) 500-50 mcg/dose inhaler    albuterol (2 5 mg/3 mL) 0 083 % nebulizer solution    Acute bronchitis, unspecified organism        Relevant Medications    fluticasone-salmeterol (ADVAIR DISKUS) 500-50 mcg/dose inhaler    albuterol (2 5 mg/3 mL) 0 083 % nebulizer solution    azithromycin (ZITHROMAX) 500 MG tablet            Return in about 6 months (around 3/30/2020)  All questions are answered to the patient's satisfaction and understanding  She verbalizes understanding  She is encouraged to call with any further questions or concerns  Portions of the record may have been created with voice recognition software  Occasional wrong word or "sound a like" substitutions may have occurred due to the inherent limitations of voice recognition software  Read the chart carefully and recognize, using context, where substitutions have occurred  HPI:    Bee Sheppard is a 77-year-old female with history of centrilobular emphysema  She had pulmonary function test done in June of 2019  Forced vital capacity was 1 60 L or 67% of predicted, FEV1 was 0 7 L or 39% and obstruction ratio was 44%  There was no improvement post bronchodilation  She is a former smoker  She smoked most of her adult life  She quit approximately 9 years ago she was a pack and half per day smoker  Since her last visit Doug Chad did have a CT of her lung  This was done as screening program   There was moderate diffuse emphysematous lung changes noted there was no infiltrate or pleural effusion nor was there any suspicious pulmonary nodules  This was done in June of 2019  Doug Bonilla does follow with Cardiology  She has a history of noncritical coronary artery disease  She has history of dyslipidemia  Last 2D echocardiogram was done in December of 2018  Ejection fraction was 55%    Left ventricular diastolic function parameters were normal   There was mild regurgitation of the mitral valve, no evidence of aortic valve and mild regurgitation of tricuspid valve  nically Signed by MILEY Beach    ______________________________________________________________________    Chief Complaint:   Chief Complaint   Patient presents with    Shortness of Breath     pt states the same     Cough     prodcutive Pt reports having a cold        Patient ID: Nila Varghese is a 76 y o  y o  female has a past medical history of Breast lump, CHF (congestive heart failure) ([facility] ), COPD (chronic obstructive pulmonary disease) ([facility] ), Diverticulitis, [facility] ), History of chronic obstructive lung disease, Non-productive cough, and SOBOE (shortness of breath on exertion)  9/30/2019  Patient presents today for follow-up visit  Shortness of Breath   This is a chronic problem  The current episode started more than 1 year ago  The problem occurs daily  The problem has been waxing and waning  Associated symptoms include wheezing  The symptoms are aggravated by exercise  The patient has no known risk factors for DVT/PE  She has tried beta agonist inhalers, ipratropium inhalers, rest and steroid inhalers for the symptoms  The treatment provided mild relief  Her past medical history is significant for chronic lung disease  Cough   This is a chronic problem  The current episode started more than 1 year ago  The problem has been gradually worsening  The problem occurs every few hours  The cough is productive of sputum  Associated symptoms include shortness of breath and wheezing  Risk factors for lung disease include animal exposure  She has tried steroid inhaler, ipratropium inhaler and oral steroids for the symptoms  The treatment provided mild relief  Her past medical history is significant for emphysema  Review of Systems   HENT: Negative  Eyes: Negative      Respiratory: Positive for cough, shortness of breath and wheezing  Cardiovascular: Negative  Gastrointestinal: Negative  Endocrine: Negative  Genitourinary: Negative  Musculoskeletal: Negative  Skin: Negative  Allergic/Immunologic: Negative  Neurological: Positive for dizziness  Hematological: Negative  Psychiatric/Behavioral: Negative  Smoking history: She reports that she quit smoking about 9 years ago  She has a 82 50 pack-year smoking history   She has never used smokeless tobacco     The following portions of the patient's history were reviewed and updated as appropriate: allergies, current medications, past family history, past medical history, past social history, past surgical history and problem list     Immunization History   Administered Date(s) Administered    Influenza Quadrivalent Preservative Free 3 years and older IM 03/25/2014    Influenza Split High Dose Preservative Free IM 12/06/2016    Influenza TIV (IM) 12/17/2012    Pneumococcal Polysaccharide PPV23 12/17/2012    Tdap 12/17/2012     Current Outpatient Medications   Medication Sig Dispense Refill    albuterol (VENTOLIN HFA) 90 mcg/act inhaler Inhale 2 puffs 4 (four) times a day 1 Inhaler 3    aspirin (ASPIR-LOW) 81 mg EC tablet Take 1 tablet by mouth daily      atorvastatin (LIPITOR) 40 mg tablet Take 1 tablet (40 mg total) by mouth daily 30 tablet 1    Calcium 600 MG tablet Take by mouth      DAILY MULTIPLE VITAMINS PO Take by mouth      lisinopril (ZESTRIL) 5 mg tablet TAKE 1 TABLET BY MOUTH ONCE DAILY 90 tablet 2    metoprolol tartrate (LOPRESSOR) 25 mg tablet Take 0 5 tablets (12 5 mg total) by mouth 2 (two) times a day 60 tablet 5    SPIRIVA HANDIHALER 18 MCG inhalation capsule INHALE 1 PUFF BY MOUTH ONCE DAILY FOR 30 DAYS 30 capsule 5    albuterol (2 5 mg/3 mL) 0 083 % nebulizer solution Take 1 vial (2 5 mg total) by nebulization 3 (three) times a day 270 mL 3    azithromycin (ZITHROMAX) 500 MG tablet Take 1 tablet (500 mg total) by mouth daily for 5 days 5 tablet 0    fluticasone-salmeterol (ADVAIR DISKUS) 500-50 mcg/dose inhaler Inhale 1 puff 2 (two) times a day Rinse mouth after use  60 each 0    SPIRIVA HANDIHALER 18 MCG inhalation capsule Place 1 capsule (18 mcg total) into inhaler and inhale daily for 30 days (Patient not taking: Reported on 4/8/2019) 30 capsule 0     No current facility-administered medications for this visit  Allergies: Patient has no known allergies  Objective:  Vitals:    09/30/19 0915   BP: 122/82   BP Location: Left arm   Patient Position: Sitting   Cuff Size: Adult   Resp: 12   Temp: 99 5 °F (37 5 °C)   TempSrc: Tympanic   SpO2: 99%   Weight: 82 6 kg (182 lb)   Height: 5' (1 524 m)   Oxygen Therapy  SpO2: 99 %(on 4l pulse)    Wt Readings from Last 3 Encounters:   09/30/19 82 6 kg (182 lb)   06/03/19 83 9 kg (185 lb)   04/08/19 84 4 kg (186 lb)     Body mass index is 35 54 kg/m²  Physical Exam   Constitutional: She is oriented to person, place, and time  She appears well-developed and well-nourished  HENT:   Head: Normocephalic and atraumatic  A Mallampati 4   Eyes: Pupils are equal, round, and reactive to light  EOM are normal    Neck: Normal range of motion  Neck supple  Cardiovascular: Normal rate and regular rhythm  Pulmonary/Chest: Effort normal  She has decreased breath sounds  Abdominal: Soft  Musculoskeletal: Normal range of motion  Right lower leg: Normal         Left lower leg: Normal    Neurological: She is alert and oriented to person, place, and time  Skin: Skin is warm and dry  Capillary refill takes less than 2 seconds  Psychiatric: Her mood appears anxious  Lab Review:   not applicable    Diagnostics:  I have personally reviewed pertinent films in PACS    Office Spirometry Results:     ESS:    No results found

## 2019-09-30 NOTE — ASSESSMENT & PLAN NOTE
Patient has moderately severe centrilobular emphysema  I did review CT of chest with patient personally  Images showed moderately severe emphysematous changes  These are chronic  There was no evidence of lung nodules  Last PFT was done in June of 2019  Forced vital capacity is 1 60 L or 67% of predicted, FEV1 is 0 7 L or 39% of predicted obstruction ratio 44%  In June 2018 FVC was 1 59 L or 58% of predicted FEV1 was 0 7 L or 34% of predicted  There was no improvement post  Bronchodilation  Patient is stable  She remains on her maintenance inhalers  These include Advair Diskus 500/51 puff b i d  And Spiriva 1 puff daily

## 2019-10-25 ENCOUNTER — APPOINTMENT (OUTPATIENT)
Dept: LAB | Facility: HOSPITAL | Age: 74
End: 2019-10-25
Attending: INTERNAL MEDICINE
Payer: MEDICARE

## 2019-10-25 ENCOUNTER — TRANSCRIBE ORDERS (OUTPATIENT)
Dept: ADMINISTRATIVE | Facility: HOSPITAL | Age: 74
End: 2019-10-25

## 2019-10-25 DIAGNOSIS — I25.10 CAD IN NATIVE ARTERY: ICD-10-CM

## 2019-10-25 DIAGNOSIS — E78.5 DYSLIPIDEMIA: ICD-10-CM

## 2019-10-25 DIAGNOSIS — N18.30 CHRONIC KIDNEY DISEASE, STAGE III (MODERATE) (HCC): ICD-10-CM

## 2019-10-25 DIAGNOSIS — I10 ESSENTIAL HYPERTENSION: ICD-10-CM

## 2019-10-25 LAB
ALBUMIN SERPL BCP-MCNC: 3.3 G/DL (ref 3.5–5)
ALP SERPL-CCNC: 82 U/L (ref 46–116)
ALT SERPL W P-5'-P-CCNC: 32 U/L (ref 12–78)
ANION GAP SERPL CALCULATED.3IONS-SCNC: 4 MMOL/L (ref 4–13)
AST SERPL W P-5'-P-CCNC: 26 U/L (ref 5–45)
BILIRUB SERPL-MCNC: 0.4 MG/DL (ref 0.2–1)
BUN SERPL-MCNC: 12 MG/DL (ref 5–25)
CALCIUM SERPL-MCNC: 8.8 MG/DL (ref 8.3–10.1)
CHLORIDE SERPL-SCNC: 105 MMOL/L (ref 100–108)
CHOLEST SERPL-MCNC: 141 MG/DL (ref 50–200)
CO2 SERPL-SCNC: 33 MMOL/L (ref 21–32)
CREAT SERPL-MCNC: 0.93 MG/DL (ref 0.6–1.3)
GFR SERPL CREATININE-BSD FRML MDRD: 61 ML/MIN/1.73SQ M
GLUCOSE P FAST SERPL-MCNC: 98 MG/DL (ref 65–99)
HDLC SERPL-MCNC: 47 MG/DL
LDLC SERPL CALC-MCNC: 66 MG/DL (ref 0–100)
NONHDLC SERPL-MCNC: 94 MG/DL
POTASSIUM SERPL-SCNC: 4.3 MMOL/L (ref 3.5–5.3)
PROT SERPL-MCNC: 7.3 G/DL (ref 6.4–8.2)
SODIUM SERPL-SCNC: 142 MMOL/L (ref 136–145)
TRIGL SERPL-MCNC: 138 MG/DL

## 2019-10-25 PROCEDURE — 80053 COMPREHEN METABOLIC PANEL: CPT

## 2019-10-25 PROCEDURE — 36415 COLL VENOUS BLD VENIPUNCTURE: CPT

## 2019-10-25 PROCEDURE — 80061 LIPID PANEL: CPT

## 2019-10-28 DIAGNOSIS — J44.9 COPD MIXED TYPE (HCC): ICD-10-CM

## 2019-11-04 ENCOUNTER — OFFICE VISIT (OUTPATIENT)
Dept: CARDIOLOGY CLINIC | Facility: CLINIC | Age: 74
End: 2019-11-04
Payer: MEDICARE

## 2019-11-04 VITALS
SYSTOLIC BLOOD PRESSURE: 126 MMHG | WEIGHT: 187 LBS | HEIGHT: 60 IN | BODY MASS INDEX: 36.71 KG/M2 | DIASTOLIC BLOOD PRESSURE: 72 MMHG | HEART RATE: 72 BPM | OXYGEN SATURATION: 95 %

## 2019-11-04 DIAGNOSIS — J43.2 CENTRILOBULAR EMPHYSEMA (HCC): ICD-10-CM

## 2019-11-04 DIAGNOSIS — R06.00 DYSPNEA ON EXERTION: ICD-10-CM

## 2019-11-04 DIAGNOSIS — I10 ESSENTIAL HYPERTENSION: ICD-10-CM

## 2019-11-04 DIAGNOSIS — I25.10 CAD IN NATIVE ARTERY: Primary | ICD-10-CM

## 2019-11-04 DIAGNOSIS — E78.5 DYSLIPIDEMIA: ICD-10-CM

## 2019-11-04 DIAGNOSIS — N18.30 CHRONIC KIDNEY DISEASE, STAGE III (MODERATE) (HCC): ICD-10-CM

## 2019-11-04 DIAGNOSIS — E66.9 CLASS 1 OBESITY: ICD-10-CM

## 2019-11-04 PROCEDURE — 99214 OFFICE O/P EST MOD 30 MIN: CPT | Performed by: INTERNAL MEDICINE

## 2019-11-04 PROCEDURE — 93000 ELECTROCARDIOGRAM COMPLETE: CPT | Performed by: INTERNAL MEDICINE

## 2019-11-04 NOTE — PROGRESS NOTES
Office Cardiology Progress Note  Alfredo Corbin 76 y o  female MRN: [id number]  11/04/19  11:41 AM      ASSESSMENT:       1  Suppressed prior chest pain and noncritical CAD with normal Lexiscan nuclear stress study on 12/28/2018  2  Chronic obesity, class I, with 15 4 pound weight gain in approximately 6 3 years  3  Stable exertional dyspnea with underlying COPD/emphysema and previously normal sleep study   Last FEV1 was 34% with obstruction ratio of 44%, as of June, 2019, with  Pulmonary Medicine follow-up on 09/30/2019  Patient on continuous nasal oxygen  4  Controlled essential hypertension  5  Dyslipidemia, controlled, with  latest  LDL of 66 as of 10/25/2019  6  Chronic insomnia  7  History of acute jejunitis versus omental infarction 5/25/14  8  Mild chronic kidney disease, stage III, with latest estimated GFR 61    9  Suppressed PSVT with acute chest discomfort, dyspnea, and palpitations on 12/27/2018, with no recurrence  Plan       Patient Instructions     1  Continue current management and medication  2  Cardiology follow-up approximately six months with EKG  HPI    This 76 y o  female  denies new cardiopulmonary and medical symptoms  In contrast to seven months ago, she now uses nasal oxygen 24 hours daily  Apart from chronic stable exertional dyspnea, there have been no new medical or cardiopulmonary symptoms  She is seen in follow-up of the below listed diagnoses  Encounter Diagnoses   Name Primary?     CAD in native artery Yes    Dyspnea on exertion     Dyslipidemia     Essential hypertension     Centrilobular emphysema (HCC)     Chronic kidney disease, stage III (moderate) (HCC)     Class 1 obesity         Review of Systems    All other systems negative, except as noted in history of present illness    Historical Information   Past Medical History:   Diagnosis Date    Breast lump     CHF (congestive heart failure) (HCC)     [facility]) [facility] )     Diverticulitis     Emphysema of lung ([facility] )     History of chronic obstructive lung disease     Non-productive cough     SOBOE (shortness of breath on exertion)      History reviewed  No pertinent surgical history  Social History     Substance and Sexual Activity   Alcohol Use No     Social History     Substance and Sexual Activity   Drug Use No     Social History     Tobacco Use   Smoking Status Former Smoker    Packs/day: 1 50    Years: 55 00    Pack years: 82 50    Last attempt to quit: 2010    Years since quittin 4   Smokeless Tobacco Never Used   Tobacco Comment    ex cigarette smoker       Family History:  Family History   Problem Relation Age of Onset    Stroke Mother     Diabetes Father     Heart disease Father     Emphysema Family     Stroke Family         syndrome    Diabetes Sister          Meds/Allergies     Prior to Admission medications    Medication Sig Start Date End Date Taking? Authorizing Provider   albuterol (VENTOLIN HFA) 90 mcg/act inhaler Inhale 2 puffs 4 (four) times a day 6/3/19  Yes MILEY Thomson   aspirin (ASPIR-LOW) 81 mg EC tablet Take 1 tablet by mouth daily 14  Yes Historical Provider, MD   atorvastatin (LIPITOR) 40 mg tablet Take 1 tablet (40 mg total) by mouth daily 19  Yes Kateri Skiff, MD   Calcium 600 MG tablet Take by mouth   Yes Historical Provider, MD   DAILY MULTIPLE VITAMINS PO Take by mouth   Yes Historical Provider, MD   fluticasone-salmeterol (ADVAIR DISKUS) 500-50 mcg/dose inhaler Inhale 1 puff 2 (two) times a day Rinse mouth after use   10/28/19  Yes MILEY Thomson   lisinopril (ZESTRIL) 5 mg tablet TAKE 1 TABLET BY MOUTH ONCE DAILY 19  Yes Armen Orellana MD   metoprolol tartrate (LOPRESSOR) 25 mg tablet Take 0 5 tablets (12 5 mg total) by mouth 2 (two) times a day 3/2/19  Yes Tesfaye Landeros DO   SPIRIVA HANDIHALER 18 MCG inhalation capsule INHALE 1 PUFF BY MOUTH ONCE DAILY FOR 30 DAYS 19  Yes Radhika Phan Cheryle Pilot, CRNP   SPIRIVA HANDIHALER 18 MCG inhalation capsule Place 1 capsule (18 mcg total) into inhaler and inhale daily for 30 days  Patient not taking: Reported on 4/8/2019 12/27/18 1/26/19  Emmanuel Akhtar PA-C       No Known Allergies      Vitals:    11/04/19 1121   BP: 126/72   BP Location: Left arm   Patient Position: Sitting   Cuff Size: Standard   Pulse: 72   SpO2: 95%   Weight: 84 8 kg (187 lb)   Height: 5' (1 524 m)       Body mass index is 36 52 kg/m²  1 pound weight gain in approximately seven months    Physical Exam:    General Appearance:  Alert, cooperative, no distress, appears stated age and moderately obese  Patient is wearing nasal oxygen  Head:  Normocephalic, without obvious abnormality, atraumatic   Eyes:  PERRL, conjunctiva/corneas clear, EOM's intact,   both eyes   Ears:  Normal TM's and external ear canals, both ears   Nose: Nares normal, septum midline, mucosa normal, no drainage or sinus tenderness   Throat: Lips, mucosa, and tongue normal; teeth and gums normal   Neck: Supple, symmetrical, trachea midline, no adenopathy, thyroid: not enlarged, symmetric, no tenderness/mass/nodules, no carotid bruit or JVD   Back:   Symmetric, no curvature, ROM normal, no CVA tenderness   Lungs:   Clear to auscultation bilaterally with diminished breath sounds, respirations unlabored   Chest Wall:  No tenderness or deformity   Heart:  Regular rate and rhythm, S1, S2 normal, no murmur, rub or gallop   Abdomen:   Soft, non-tender, bowel sounds active all four quadrants,  no masses, no organomegaly with moderate obesity noted     Extremities: Extremities normal, atraumatic, no cyanosis or edema   Pulses: 2+ and symmetric   Skin: Skin showed normal color, texture, turgor and no rashes or lesions   Lymph nodes: Cervical, supraclavicular, and axillary nodes normal   Neurologic: Normal         Cardiographics    ECG 11/04/19:    Normal sinus rhythm  Low QRS voltage  Otherwise normal ECG, unchanged from 01/08/2019    IMAGING:    No Chest XR results available for this patient            LAB REVIEW:      Lab Results   Component Value Date    SODIUM 142 10/25/2019    K 4 3 10/25/2019     10/25/2019    CO2 33 (H) 10/25/2019    BUN 12 10/25/2019    CREATININE 0 93 10/25/2019    GLUF 98 10/25/2019    CALCIUM 8 8 10/25/2019    AST 26 10/25/2019    ALT 32 10/25/2019    ALKPHOS 82 10/25/2019    EGFR 61 10/25/2019     Lab Results   Component Value Date    CHOLESTEROL 141 10/25/2019    CHOLESTEROL 120 12/28/2018    CHOLESTEROL 167 08/27/2018     Lab Results   Component Value Date    HDL 47 10/25/2019    HDL 48 12/28/2018    HDL 58 08/27/2018       Lab Results   Component Value Date    LDLCALC 66 10/25/2019    LDLCALC 60 12/28/2018    LDLCALC 92 08/27/2018     Lab Results   Component Value Date    TRIG 138 10/25/2019    TRIG 58 12/28/2018    TRIG 86 08/27/2018               Severiano Gulling, MD

## 2019-11-04 NOTE — PATIENT INSTRUCTIONS
1  Continue current management and medication  2  Cardiology follow-up approximately six months with EKG

## 2019-11-25 DIAGNOSIS — J44.9 COPD MIXED TYPE (HCC): ICD-10-CM

## 2019-12-09 DIAGNOSIS — E78.5 DYSLIPIDEMIA: ICD-10-CM

## 2019-12-09 DIAGNOSIS — I25.10 CAD IN NATIVE ARTERY: ICD-10-CM

## 2019-12-09 RX ORDER — ATORVASTATIN CALCIUM 40 MG/1
40 TABLET, FILM COATED ORAL DAILY
Qty: 90 TABLET | Refills: 3 | Status: SHIPPED | OUTPATIENT
Start: 2019-12-09 | End: 2021-01-05 | Stop reason: SDUPTHER

## 2020-01-03 DIAGNOSIS — J44.9 COPD MIXED TYPE (HCC): ICD-10-CM

## 2020-02-12 DIAGNOSIS — J44.9 COPD MIXED TYPE (HCC): ICD-10-CM

## 2020-02-14 DIAGNOSIS — J44.9 COPD MIXED TYPE (HCC): ICD-10-CM

## 2020-02-17 DIAGNOSIS — J43.2 CENTRILOBULAR EMPHYSEMA (HCC): ICD-10-CM

## 2020-02-17 DIAGNOSIS — I10 ESSENTIAL HYPERTENSION: ICD-10-CM

## 2020-02-17 RX ORDER — TIOTROPIUM BROMIDE 18 UG/1
18 CAPSULE ORAL; RESPIRATORY (INHALATION) DAILY
Qty: 30 CAPSULE | Refills: 5 | Status: SHIPPED | OUTPATIENT
Start: 2020-02-17 | End: 2020-05-16 | Stop reason: ALTCHOICE

## 2020-02-17 RX ORDER — LISINOPRIL 5 MG/1
TABLET ORAL
Qty: 90 TABLET | Refills: 0 | Status: SHIPPED | OUTPATIENT
Start: 2020-02-17 | End: 2020-05-16 | Stop reason: SDUPTHER

## 2020-02-17 NOTE — TELEPHONE ENCOUNTER
Pharmacy called  Prescription for advair needs to have directions on it  Can you please resend with how to take the medication

## 2020-03-09 ENCOUNTER — OFFICE VISIT (OUTPATIENT)
Dept: PULMONOLOGY | Facility: MEDICAL CENTER | Age: 75
End: 2020-03-09
Payer: MEDICARE

## 2020-03-09 VITALS
OXYGEN SATURATION: 90 % | HEIGHT: 62 IN | HEART RATE: 66 BPM | DIASTOLIC BLOOD PRESSURE: 72 MMHG | WEIGHT: 188 LBS | BODY MASS INDEX: 34.6 KG/M2 | TEMPERATURE: 98.4 F | RESPIRATION RATE: 12 BRPM | SYSTOLIC BLOOD PRESSURE: 132 MMHG

## 2020-03-09 DIAGNOSIS — J43.2 CENTRILOBULAR EMPHYSEMA (HCC): ICD-10-CM

## 2020-03-09 DIAGNOSIS — J96.11 CHRONIC RESPIRATORY FAILURE WITH HYPOXIA (HCC): ICD-10-CM

## 2020-03-09 DIAGNOSIS — Z87.891 FORMER SMOKER: Primary | ICD-10-CM

## 2020-03-09 PROBLEM — R09.89 SYMPTOMS OF UPPER RESPIRATORY INFECTION (URI): Status: RESOLVED | Noted: 2018-12-27 | Resolved: 2020-03-09

## 2020-03-09 PROBLEM — J20.8 ACUTE BRONCHITIS DUE TO OTHER SPECIFIED ORGANISMS: Status: RESOLVED | Noted: 2019-09-30 | Resolved: 2020-03-09

## 2020-03-09 PROCEDURE — 3078F DIAST BP <80 MM HG: CPT | Performed by: NURSE PRACTITIONER

## 2020-03-09 PROCEDURE — 99214 OFFICE O/P EST MOD 30 MIN: CPT | Performed by: NURSE PRACTITIONER

## 2020-03-09 PROCEDURE — 4040F PNEUMOC VAC/ADMIN/RCVD: CPT | Performed by: NURSE PRACTITIONER

## 2020-03-09 PROCEDURE — 1036F TOBACCO NON-USER: CPT | Performed by: NURSE PRACTITIONER

## 2020-03-09 PROCEDURE — 1160F RVW MEDS BY RX/DR IN RCRD: CPT | Performed by: NURSE PRACTITIONER

## 2020-03-09 PROCEDURE — 3075F SYST BP GE 130 - 139MM HG: CPT | Performed by: NURSE PRACTITIONER

## 2020-03-09 PROCEDURE — 3008F BODY MASS INDEX DOCD: CPT | Performed by: NURSE PRACTITIONER

## 2020-03-09 RX ORDER — ALBUTEROL SULFATE 90 UG/1
2 AEROSOL, METERED RESPIRATORY (INHALATION) 4 TIMES DAILY
Qty: 1 INHALER | Refills: 3 | Status: SHIPPED | OUTPATIENT
Start: 2020-03-09 | End: 2022-01-31 | Stop reason: SDUPTHER

## 2020-03-09 RX ORDER — ALBUTEROL SULFATE 2.5 MG/3ML
2.5 SOLUTION RESPIRATORY (INHALATION) EVERY 6 HOURS PRN
COMMUNITY
End: 2020-08-25

## 2020-03-09 NOTE — ASSESSMENT & PLAN NOTE
Patient had a CT screening of the chest done that I personally reviewed with patient and her daughter who accompanies her today  Diffuse emphysematous changes were noted  Her last pulmonary function test was done that showed FEV1 is 0 7 L or 34% of predicted  Smoked for most of her adult life beginning at age 16 and stopped in 2010    She smoked up to 1-1/2 packs per day

## 2020-03-09 NOTE — PATIENT INSTRUCTIONS
Emphysema   WHAT YOU NEED TO KNOW:   Emphysema is a long-term lung disease that is most commonly caused by smoking  It is part of a group of lung diseases called chronic obstructive pulmonary disease (COPD)  Emphysema damages the alveoli (air sacs) in your lungs  This makes it hard for your lungs to send oxygen to the rest of your body  DISCHARGE INSTRUCTIONS:   Call 911 if:   · You have pain, pressure, or fullness in your chest that lasts more than a few minutes or returns  · You have pain or discomfort in your back, neck, jaw, stomach, or arm  Return to the emergency department if:   · You have shortness of breath that is so severe you cannot talk  · You have a sudden cold sweat  · You cough up blood  · You are confused, dizzy, or feel like you may pass out  Contact your healthcare provider if:   · You have a fever  · You have trouble doing your usual activities because it is hard to breathe  · You need to use your inhalers or take breathing treatments more often than usual      · You cough up more sputum than is normal for you  · You wheeze more than is normal for you  · Your legs or ankles are swollen  · You have questions or concerns about your condition or care  Do not smoke: If you smoke, it is never too late to quit  Quitting smoking will improve your health and the health of those around you  If you smoke, ask for information about how to stop  Medicines:   · Bronchodilators  help open your airway so you can breathe better  They are most often taken through one of the following devices:     ¨ An inhaler  is a handheld device that delivers medicine that you breathe in  ¨ A nebulizer  is a machine that turns liquid medicine into mist that you breathe in through a mouthpiece  · Steroids  decrease swelling in your lungs  They may be inhaled or taken as a pill  · Take your medicine as directed    Contact your healthcare provider if you think your medicine is not helping or if you have side effects  Tell him or her if you are allergic to any medicine  Keep a list of the medicines, vitamins, and herbs you take  Include the amounts, and when and why you take them  Bring the list or the pill bottles to follow-up visits  Carry your medicine list with you in case of an emergency  Follow up with your healthcare provider or pulmonologist as directed:  Write down your questions so you remember to ask them during your visits  Emphysema exacerbation:  An exacerbation is when your symptoms suddenly get worse  You may have a harder time breathing, your cough may get worse, and you may cough up more sputum  You may have a fever, increased heart rate, or feel sleepy  An exacerbation may be caused by a lung infection, air pollution, or other lung irritants  Sometimes the cause of an exacerbation is unknown  Your healthcare provider or pulmonologist may change your treatment to help relieve exacerbations  Manage your emphysema and help prevent an exacerbation:   · Avoid irritants  Wear protective gear if your workplace has dust or chemicals that bother you  Stay inside when air quality is bad  · Seek treatment  Get early treatment if your symptoms are getting worse  This may help you recover faster  Know what to do in case of an exacerbation  · Exercise daily  Talk to your healthcare provider about the best exercise plan for you  Exercise can help decrease breathing problems and improve your health  · Use pursed-lip breathing  Pursed-lip breathing can be used any time you feel short of breath  It can be especially helpful before you start an activity  ¨ Count to 2 while you take a deep breath in through your nose  ¨ Slowly breathe out through your mouth with your lips slightly puckered  You should make a quiet hissing sound as you breathe out      ¨ Repeat this exercise 4 or 5 times a day Once you are used to doing pursed-lip breathing, you can use it any time you need more air  · Use sleep positions that help you breathe better  Sleep with your upper body raised if you have trouble breathing when you lie down  Use foam wedges or elevate the head of your bed  Use a device that will tilt your whole body, or bend your body at the waist  The device should not bend your body at the upper back or neck  You may sleep better in a recliner  · Ask your healthcare provider about the flu and pneumonia vaccines  All adults should get the flu (influenza) vaccine every year as soon as it becomes available  The pneumonia vaccine is given to adults aged 72 or older to prevent pneumococcal disease, such as pneumonia  Adults aged 23 to 59 years who are at high risk for pneumococcal disease also should get the pneumococcal vaccine  It may need to be repeated 1 or 5 years later  © 2017 [employer] Information is for End User's use only and may not be sold, redistributed or otherwise used for commercial purposes  All illustrations and images included in CareNotes® are the copyrighted property of [employer]  or Florentino Martinez  The above information is an  only  It is not intended as medical advice for individual conditions or treatments  Talk to your doctor, nurse or pharmacist before following any medical regimen to see if it is safe and effective for you

## 2020-03-09 NOTE — ASSESSMENT & PLAN NOTE
Patient has stage 3 severe emphysema  Last PFTs were done in June 2019  Forced vital capacity was 1 60 L or 67% of predicted, FEV1 is 0 7 L or 39% obstruction ratio 44%  Flow volume loop showed  Obstructive defect  He is currently on optimal therapy  This is triple therapy that includes Advair 500/51 puff b i d , Spiriva Handy haler 1 puff daily  She also has albuterol via nebulizer and rescue inhaler  She has use this 3 or 4 times daily as she thought this was how was to be used  I explained to her that as per insurance, it is prescribed on a around the clock dosing but can be used as a as needed  I am giving her today a sample of Trelegy 1 puff daily  She will let us know if she likes this  She is also aware that this would take the place of both Advair  And Spiriva  Currently she appears stable

## 2020-03-09 NOTE — ASSESSMENT & PLAN NOTE
Patient has history of chronic respiratory failure with hypoxia  She continues to use an benefit with supplemental oxygen  Portable oxygen tanks are causing additional adverse medical outcomes in patient's breathing that does not allow for adequate ambulation  Patient will benefit from usage of a POCvia nasal cannula  I will have [facility], evaluate and supply patient with a POC at a setting of continous  O2 saturation on room air at rest 90% O2 saturation on room air on ambulation 76%, O2 saturation on 3 L/M on ambulation 91% via nasal cannula  Portable oxygen tanks are causing additional adverse medical outcomes in patients breathing that does not allow for adequate ambulation    Evaluation was done in the office today it does appear she qualifies for portable concentrator

## 2020-03-09 NOTE — PROGRESS NOTES
Assessment/Plan:     Problem List Items Addressed This Visit        Respiratory    Centrilobular emphysema ([address] )     Patient has stage 3 severe emphysema  Last PFTs were done in June 2019  Forced vital capacity was 1 60 L or 67% of predicted, FEV1 is 0 7 L or 39% obstruction ratio 44%  Flow volume loop showed  Obstructive defect  He is currently on optimal therapy  This is triple therapy that includes Advair 500/51 puff b i d , Spiriva Handy haler 1 puff daily  She also has albuterol via nebulizer and rescue inhaler  She has use this 3 or 4 times daily as she thought this was how was to be used  I explained to her that as per insurance, it is prescribed on a around the clock dosing but can be used as a as needed  I am giving her today a sample of Trelegy 1 puff daily  She will let us know if she likes this  She is also aware that this would take the place of both Advair  And Spiriva  Currently she appears stable  Relevant Medications    albuterol (2 5 mg/3 mL) 0 083 % nebulizer solution    albuterol (Ventolin HFA) 90 mcg/act inhaler    fluticasone-umeclidinium-vilanterol (TRELEGY) 100-62 5-25 MCG/INH inhaler    Chronic respiratory failure with hypoxia (HCC)     Patient has history of chronic respiratory failure with hypoxia  She continues to use an benefit with supplemental oxygen  Portable oxygen tanks are causing additional adverse medical outcomes in patient's breathing that does not allow for adequate ambulation  Patient will benefit from usage of a POCvia nasal cannula  I will have [facility], evaluate and supply patient with a POC at a setting of continous  O2 saturation on room air at rest 90% O2 saturation on room air on ambulation 76%, O2 saturation on 3 L/M on ambulation 91% via nasal cannula  Portable oxygen tanks are causing additional adverse medical outcomes in patients breathing that does not allow for adequate ambulation    Evaluation was done in the office today it does appear she qualifies for portable concentrator         Relevant Orders    Home Oxygen with Portability       Other    Former smoker - Primary     Patient had a CT screening of the chest done that I personally reviewed with patient and her daughter who accompanies her today  Diffuse emphysematous changes were noted  Her last pulmonary function test was done that showed FEV1 is 0 7 L or 34% of predicted  Smoked for most of her adult life beginning at age 16 and stopped in 2010  She smoked up to 1-1/2 packs per day                 Return in about 6 months (around 9/9/2020)  All questions are answered to the patient's satisfaction and understanding  She verbalizes understanding  She is encouraged to call with any further questions or concerns  Portions of the record may have been created with voice recognition software  Occasional wrong word or "sound a like" substitutions may have occurred due to the inherent limitations of voice recognition software  Read the chart carefully and recognize, using context, where substitutions have occurred  Electronically Signed by Prince Suarez    ______________________________________________________________________  HPI:  Ciara Gottlieb is a 70-year-old female who is a former smoker  Her last office visit was in September 2019  She smoked for most of her adult life  Beginning at the age of 16 she smoked 1-1/2 pack of cigarettes and quit in the year of 2010  She did have a CT of her chest last on June 2019  Moderate diffuse emphysematous lung changes were noted  There were no suspicious pulmonary nodules  I did review images with patient and her daughter who accompanies her today  She also has a history of chronic respiratory failure with hypoxia  Patient was last seen by Cardiology in November 2019   She has a history of noncritical coronary artery disease and also chronic obesity she also has history of controlled to central hypertension and dyslipidemia as well as chronic insomnia  In the past she did have a episode of SVT with acute chest discomfort  However this was in December of 2018 for which she has no reoccurrence  Chief Complaint:   Chief Complaint   Patient presents with    Shortness of Breath     Oxygen re-evaluation       Patient ID: Ana Maria Lawrence is a 76 y o  y o  female has a past medical history of Breast lump, CHF (congestive heart failure) ([facility] [address] COPD (chronic obstructive pulmonary disease) ([facility] [address] ), Diverticulitis, [facility] of lung ([facility] ), History of chronic obstructive lung disease, Non-productive cough, and SOBOE (shortness of breath on exertion)  3/9/2020  Patient presents today for follow-up visit  Shortness of Breath   This is a chronic problem  The current episode started more than 1 year ago  The problem occurs daily  The problem has been unchanged  The average episode lasts 2 minutes  The symptoms are aggravated by exercise  The patient has no known risk factors for DVT/PE  She has tried beta agonist inhalers, ipratropium inhalers, rest and steroid inhalers for the symptoms  The treatment provided mild relief  Her past medical history is significant for chronic lung disease and COPD  Review of Systems   Constitutional: Negative  HENT: Negative  Respiratory: Positive for shortness of breath  Endocrine: Negative  Genitourinary: Negative  Musculoskeletal: Negative  Skin: Negative  Allergic/Immunologic: Negative  Neurological: Negative  Hematological: Negative  Psychiatric/Behavioral: Negative  Smoking history: She reports that she quit smoking about 9 years ago  She has a 82 50 pack-year smoking history   She has never used smokeless tobacco     The following portions of the patient's history were reviewed and updated as appropriate: current medications, past family history, past medical history, past social history, past surgical history and problem list     Immunization History   Administered Date(s) Administered  Influenza Quadrivalent Preservative Free 3 years and older IM 03/25/2014    Influenza Split High Dose Preservative Free IM 12/06/2016    Influenza TIV (IM) 12/17/2012    Pneumococcal Polysaccharide PPV23 12/17/2012    Tdap 12/17/2012     Current Outpatient Medications   Medication Sig Dispense Refill    albuterol (2 5 mg/3 mL) 0 083 % nebulizer solution Take 2 5 mg by nebulization every 6 (six) hours as needed for wheezing or shortness of breath      albuterol (Ventolin HFA) 90 mcg/act inhaler Inhale 2 puffs 4 (four) times a day 1 Inhaler 3    aspirin (ASPIR-LOW) 81 mg EC tablet Take 1 tablet by mouth daily      atorvastatin (LIPITOR) 40 mg tablet Take 1 tablet (40 mg total) by mouth daily 90 tablet 3    calcium carbonate-vitamin D (OSCAL-D) 500 mg-200 units per tablet Take 1 tablet by mouth 2 (two) times a day with meals      DAILY MULTIPLE VITAMINS PO Take by mouth      fluticasone-salmeterol (ADVAIR, WIXELA) 500-50 mcg/dose inhaler RINSE MOUTH AFTER USE 60 each 5    lisinopril (ZESTRIL) 5 mg tablet TAKE 1 TABLET BY MOUTH ONCE DAILY 90 tablet 0    metoprolol tartrate (LOPRESSOR) 25 mg tablet Take 0 5 tablets (12 5 mg total) by mouth 2 (two) times a day 60 tablet 5    SPIRIVA HANDIHALER 18 MCG inhalation capsule Place 1 capsule (18 mcg total) into inhaler and inhale daily 30 capsule 5    Calcium 600 MG tablet Take by mouth      fluticasone-umeclidinium-vilanterol (TRELEGY) 100-62 5-25 MCG/INH inhaler Inhale 1 puff daily Rinse mouth after use  1 Inhaler 5     No current facility-administered medications for this visit  Allergies: Patient has no known allergies  Objective:  Vitals:    03/09/20 1148   BP: 132/72   BP Location: Left arm   Patient Position: Sitting   Cuff Size: Standard   Pulse: 66   Resp: 12   Temp: 98 4 °F (36 9 °C)   TempSrc: Oral   SpO2: 90%   Weight: 85 3 kg (188 lb)   Height: 5' 1 5" (1 562 m)   Oxygen Therapy  SpO2: 90 %(RA resting)      Wt Readings from Last 3 Encounters:   03/09/20 85 3 kg (188 lb)   11/04/19 84 8 kg (187 lb)   09/30/19 82 6 kg (182 lb)     Body mass index is 34 95 kg/m²  Physical Exam   Constitutional: She is oriented to person, place, and time  She appears well-nourished  BMI 34   HENT:   Head: Normocephalic and atraumatic  Eyes: Pupils are equal, round, and reactive to light  EOM are normal    Cardiovascular: Normal rate and regular rhythm  Pulmonary/Chest: Effort normal  She has decreased breath sounds  Abdominal: Soft  Musculoskeletal: Normal range of motion  Right lower leg: Normal         Left lower leg: Normal    Neurological: She is alert and oriented to person, place, and time  Skin: Skin is warm and dry  Psychiatric: She has a normal mood and affect         Lab Review:   Appointment on 10/25/2019   Component Date Value    Sodium 10/25/2019 142     Potassium 10/25/2019 4 3     Chloride 10/25/2019 105     CO2 10/25/2019 33*    ANION GAP 10/25/2019 4     BUN 10/25/2019 12     Creatinine 10/25/2019 0 93     Glucose, Fasting 10/25/2019 98     Calcium 10/25/2019 8 8     AST 10/25/2019 26     ALT 10/25/2019 32     Alkaline Phosphatase 10/25/2019 82     Total Protein 10/25/2019 7 3     Albumin 10/25/2019 3 3*    Total Bilirubin 10/25/2019 0 40     eGFR 10/25/2019 61     Cholesterol 10/25/2019 141     Triglycerides 10/25/2019 138     HDL, Direct 10/25/2019 47     LDL Calculated 10/25/2019 66     Non-HDL-Chol (CHOL-HDL) 10/25/2019 94    Transcribe Orders on 09/11/2019   Component Date Value    Case Report 09/11/2019                      Value:Non-gynecologic Cytology                          Case: [id number]                                  Authorizing Provider:  Boris Chavez MD      Collected:           09/11/2019 1107              Ordering Location:     [facility] Received:            09/11/2019 1107                                     Patient Access Pathologist:           Gaston Tate MD                                                        Specimen:    Urine, Voided                                                                              Final Diagnosis 09/11/2019                      Value: This result contains rich text formatting which cannot be displayed here  Erinn Carbajal Gross Description 09/11/2019                      Value: This result contains rich text formatting which cannot be displayed here   Additional Information 09/11/2019                      Value: This result contains rich text formatting which cannot be displayed here  History reviewed  No pertinent surgical history  Family History   Problem Relation Age of Onset   Erinn Carbajal Stroke Mother     Diabetes Father     Heart disease Father     Emphysema Family     Stroke Family         syndrome    Diabetes Sister         Diagnostics:  I have personally reviewed pertinent films in PACS  Office Spirometry Results:     ESS:    No results found

## 2020-03-17 DIAGNOSIS — J43.2 CENTRILOBULAR EMPHYSEMA (HCC): ICD-10-CM

## 2020-03-18 DIAGNOSIS — J43.2 CENTRILOBULAR EMPHYSEMA (HCC): ICD-10-CM

## 2020-03-18 RX ORDER — FLUTICASONE FUROATE, UMECLIDINIUM BROMIDE AND VILANTEROL TRIFENATATE 100; 62.5; 25 UG/1; UG/1; UG/1
POWDER RESPIRATORY (INHALATION)
Qty: 28 EACH | Refills: 0 | Status: SHIPPED | OUTPATIENT
Start: 2020-03-18 | End: 2020-04-15

## 2020-04-15 DIAGNOSIS — J43.2 CENTRILOBULAR EMPHYSEMA (HCC): ICD-10-CM

## 2020-04-15 RX ORDER — FLUTICASONE FUROATE, UMECLIDINIUM BROMIDE AND VILANTEROL TRIFENATATE 100; 62.5; 25 UG/1; UG/1; UG/1
POWDER RESPIRATORY (INHALATION)
Qty: 60 EACH | Refills: 0 | Status: SHIPPED | OUTPATIENT
Start: 2020-04-15 | End: 2020-05-16 | Stop reason: SDUPTHER

## 2020-04-21 DIAGNOSIS — I10 ESSENTIAL HYPERTENSION: ICD-10-CM

## 2020-05-16 DIAGNOSIS — J43.2 CENTRILOBULAR EMPHYSEMA (HCC): ICD-10-CM

## 2020-05-16 DIAGNOSIS — I10 ESSENTIAL HYPERTENSION: ICD-10-CM

## 2020-05-17 DIAGNOSIS — J43.2 CENTRILOBULAR EMPHYSEMA (HCC): ICD-10-CM

## 2020-05-17 DIAGNOSIS — I10 ESSENTIAL HYPERTENSION: ICD-10-CM

## 2020-05-17 RX ORDER — LISINOPRIL 5 MG/1
5 TABLET ORAL DAILY
Qty: 90 TABLET | Refills: 0 | Status: CANCELLED | OUTPATIENT
Start: 2020-05-17

## 2020-05-17 RX ORDER — LISINOPRIL 5 MG/1
5 TABLET ORAL DAILY
Qty: 90 TABLET | Refills: 3 | Status: SHIPPED | OUTPATIENT
Start: 2020-05-17 | End: 2021-06-09 | Stop reason: SDUPTHER

## 2020-08-11 DIAGNOSIS — J43.2 CENTRILOBULAR EMPHYSEMA (HCC): Primary | ICD-10-CM

## 2020-08-25 RX ORDER — ALBUTEROL SULFATE 2.5 MG/3ML
SOLUTION RESPIRATORY (INHALATION)
Qty: 90 VIAL | Refills: 11 | Status: SHIPPED | OUTPATIENT
Start: 2020-08-25 | End: 2022-07-18

## 2021-01-05 ENCOUNTER — TELEPHONE (OUTPATIENT)
Dept: CARDIOLOGY CLINIC | Facility: CLINIC | Age: 76
End: 2021-01-05

## 2021-01-05 DIAGNOSIS — I25.10 CAD IN NATIVE ARTERY: ICD-10-CM

## 2021-01-05 DIAGNOSIS — E78.5 DYSLIPIDEMIA: ICD-10-CM

## 2021-01-05 RX ORDER — ATORVASTATIN CALCIUM 40 MG/1
40 TABLET, FILM COATED ORAL DAILY
Qty: 90 TABLET | Refills: 0 | Status: SHIPPED | OUTPATIENT
Start: 2021-01-05 | End: 2021-04-12

## 2021-01-05 NOTE — TELEPHONE ENCOUNTER
Yes   She can be scheduled as a virtual appointment  I have ordered a 90 day supply of her atorvastatin with no refills

## 2021-01-05 NOTE — TELEPHONE ENCOUNTER
Patient needs an appointment before further refills will be sent to pharmacy  Supply #90 with no refills at this time     Please assist in scheduling appt with Dr Logan Settle

## 2021-04-11 DIAGNOSIS — I25.10 CAD IN NATIVE ARTERY: ICD-10-CM

## 2021-04-11 DIAGNOSIS — E78.5 DYSLIPIDEMIA: ICD-10-CM

## 2021-04-12 RX ORDER — ATORVASTATIN CALCIUM 40 MG/1
TABLET, FILM COATED ORAL
Qty: 90 TABLET | Refills: 0 | Status: SHIPPED | OUTPATIENT
Start: 2021-04-12 | End: 2021-07-10

## 2021-04-12 NOTE — TELEPHONE ENCOUNTER
Received refill request for atorvastatin on this patient, who was last seen on 11/04/2019, nearly 1-1/2 years ago  I did a three month prescription with no refills  Let her know I cannot do any further refills unless she is seen by me before the 90 days runs out  Otherwise, she will have to get her medication from another physician  Schedule her for a follow-up visit with EKG, if she is willing  Also, she would need updated blood work prior to that visit, which I can order once she is scheduled  Just let me know

## 2021-05-12 DIAGNOSIS — I10 ESSENTIAL HYPERTENSION: ICD-10-CM

## 2021-05-14 RX ORDER — LISINOPRIL 5 MG/1
TABLET ORAL
Qty: 90 TABLET | Refills: 0 | OUTPATIENT
Start: 2021-05-14

## 2021-06-09 DIAGNOSIS — J43.2 CENTRILOBULAR EMPHYSEMA (HCC): ICD-10-CM

## 2021-06-09 DIAGNOSIS — I10 ESSENTIAL HYPERTENSION: ICD-10-CM

## 2021-06-09 RX ORDER — LISINOPRIL 5 MG/1
5 TABLET ORAL DAILY
Qty: 30 TABLET | Refills: 0 | Status: SHIPPED | OUTPATIENT
Start: 2021-06-09 | End: 2022-01-31 | Stop reason: SDUPTHER

## 2021-06-09 RX ORDER — FLUTICASONE FUROATE, UMECLIDINIUM BROMIDE AND VILANTEROL TRIFENATATE 100; 62.5; 25 UG/1; UG/1; UG/1
POWDER RESPIRATORY (INHALATION)
Qty: 60 EACH | Refills: 5 | Status: SHIPPED | OUTPATIENT
Start: 2021-06-09 | End: 2022-01-31 | Stop reason: SDUPTHER

## 2021-07-06 DIAGNOSIS — I10 ESSENTIAL HYPERTENSION: ICD-10-CM

## 2021-07-06 DIAGNOSIS — I25.10 CAD IN NATIVE ARTERY: ICD-10-CM

## 2021-07-06 DIAGNOSIS — E78.5 DYSLIPIDEMIA: ICD-10-CM

## 2021-07-06 RX ORDER — LISINOPRIL 5 MG/1
5 TABLET ORAL DAILY
Qty: 30 TABLET | Refills: 0 | Status: CANCELLED | OUTPATIENT
Start: 2021-07-06

## 2021-07-06 RX ORDER — ATORVASTATIN CALCIUM 40 MG/1
40 TABLET, FILM COATED ORAL DAILY
Qty: 90 TABLET | Refills: 0 | Status: CANCELLED | OUTPATIENT
Start: 2021-07-06

## 2021-07-07 NOTE — TELEPHONE ENCOUNTER
Please call patient and let her know she needs to reestablish care as it has been over 3 yrs since she has been seen    Thank you

## 2021-07-07 NOTE — TELEPHONE ENCOUNTER
Call placed to patient, left message for patient advising that medication cannot be refilled as she has not been seen since 2019  Advised to call and make an appointment ASAP

## 2021-07-09 DIAGNOSIS — E78.5 DYSLIPIDEMIA: ICD-10-CM

## 2021-07-09 DIAGNOSIS — I10 ESSENTIAL HYPERTENSION: ICD-10-CM

## 2021-07-09 DIAGNOSIS — I25.10 CAD IN NATIVE ARTERY: ICD-10-CM

## 2021-07-10 RX ORDER — ATORVASTATIN CALCIUM 40 MG/1
TABLET, FILM COATED ORAL
Qty: 90 TABLET | Refills: 0 | Status: SHIPPED | OUTPATIENT
Start: 2021-07-10 | End: 2022-01-31 | Stop reason: SDUPTHER

## 2021-07-10 NOTE — TELEPHONE ENCOUNTER
I received a refill request for atorvastatin 40 milligrams and noted that patient has not been seen since November, 2019  Notify her that I will not be able do any more refills unless she has a scheduled non urgent appointment with me

## 2021-07-12 NOTE — TELEPHONE ENCOUNTER
Left message for patient to call to make an appt and then please forward to clinical staff so rx can be done

## 2021-12-10 ENCOUNTER — TELEPHONE (OUTPATIENT)
Dept: FAMILY MEDICINE CLINIC | Facility: CLINIC | Age: 76
End: 2021-12-10

## 2022-01-22 ENCOUNTER — HOSPITAL ENCOUNTER (INPATIENT)
Facility: HOSPITAL | Age: 77
LOS: 4 days | Discharge: HOME | DRG: 871 | End: 2022-01-26
Attending: EMERGENCY MEDICINE | Admitting: STUDENT IN AN ORGANIZED HEALTH CARE EDUCATION/TRAINING PROGRAM
Payer: MEDICARE

## 2022-01-22 ENCOUNTER — APPOINTMENT (INPATIENT)
Dept: RADIOLOGY | Facility: HOSPITAL | Age: 77
DRG: 871 | End: 2022-01-22
Payer: MEDICARE

## 2022-01-22 DIAGNOSIS — I26.99 OTHER ACUTE PULMONARY EMBOLISM WITHOUT ACUTE COR PULMONALE (HCC): ICD-10-CM

## 2022-01-22 DIAGNOSIS — L03.90 CELLULITIS: Primary | ICD-10-CM

## 2022-01-22 DIAGNOSIS — I26.99 PULMONARY EMBOLI (HCC): ICD-10-CM

## 2022-01-22 PROBLEM — L03.115 CELLULITIS OF RIGHT LOWER EXTREMITY: Status: ACTIVE | Noted: 2022-01-22

## 2022-01-22 PROBLEM — A41.9 SEPSIS (HCC): Status: ACTIVE | Noted: 2022-01-22

## 2022-01-22 PROBLEM — N17.9 ACUTE RENAL FAILURE SUPERIMPOSED ON STAGE 3 CHRONIC KIDNEY DISEASE (HCC): Status: ACTIVE | Noted: 2018-06-05

## 2022-01-22 PROBLEM — R65.20 SEPSIS WITH ORGAN DYSFUNCTION (HCC): Status: ACTIVE | Noted: 2022-01-22

## 2022-01-22 LAB
ALBUMIN SERPL BCP-MCNC: 2.5 G/DL (ref 3.5–5)
ALP SERPL-CCNC: 103 U/L (ref 46–116)
ALT SERPL W P-5'-P-CCNC: 63 U/L (ref 12–78)
ANION GAP SERPL CALCULATED.3IONS-SCNC: 8 MMOL/L (ref 4–13)
APTT PPP: 36 SECONDS (ref 23–37)
AST SERPL W P-5'-P-CCNC: 70 U/L (ref 5–45)
BASOPHILS # BLD AUTO: 0.09 THOUSANDS/ΜL (ref 0–0.1)
BASOPHILS NFR BLD AUTO: 0 % (ref 0–1)
BILIRUB SERPL-MCNC: 1.02 MG/DL (ref 0.2–1)
BUN SERPL-MCNC: 18 MG/DL (ref 5–25)
CALCIUM ALBUM COR SERPL-MCNC: 10.2 MG/DL (ref 8.3–10.1)
CALCIUM SERPL-MCNC: 9 MG/DL (ref 8.3–10.1)
CHLORIDE SERPL-SCNC: 95 MMOL/L (ref 100–108)
CO2 SERPL-SCNC: 33 MMOL/L (ref 21–32)
CREAT SERPL-MCNC: 1.48 MG/DL (ref 0.6–1.3)
EOSINOPHIL # BLD AUTO: 0 THOUSAND/ΜL (ref 0–0.61)
EOSINOPHIL NFR BLD AUTO: 0 % (ref 0–6)
ERYTHROCYTE [DISTWIDTH] IN BLOOD BY AUTOMATED COUNT: 13.6 % (ref 11.6–15.1)
GFR SERPL CREATININE-BSD FRML MDRD: 34 ML/MIN/1.73SQ M
GLUCOSE SERPL-MCNC: 132 MG/DL (ref 65–140)
HCT VFR BLD AUTO: 39.8 % (ref 34.8–46.1)
HGB BLD-MCNC: 11.9 G/DL (ref 11.5–15.4)
IMM GRANULOCYTES # BLD AUTO: 0.2 THOUSAND/UL (ref 0–0.2)
IMM GRANULOCYTES NFR BLD AUTO: 1 % (ref 0–2)
INR PPP: 1.26 (ref 0.84–1.19)
LACTATE SERPL-SCNC: 1.8 MMOL/L (ref 0.5–2)
LYMPHOCYTES # BLD AUTO: 0.9 THOUSANDS/ΜL (ref 0.6–4.47)
LYMPHOCYTES NFR BLD AUTO: 3 % (ref 14–44)
MCH RBC QN AUTO: 30.1 PG (ref 26.8–34.3)
MCHC RBC AUTO-ENTMCNC: 29.9 G/DL (ref 31.4–37.4)
MCV RBC AUTO: 101 FL (ref 82–98)
MONOCYTES # BLD AUTO: 1.65 THOUSAND/ΜL (ref 0.17–1.22)
MONOCYTES NFR BLD AUTO: 6 % (ref 4–12)
NEUTROPHILS # BLD AUTO: 24.86 THOUSANDS/ΜL (ref 1.85–7.62)
NEUTS SEG NFR BLD AUTO: 90 % (ref 43–75)
NRBC BLD AUTO-RTO: 0 /100 WBCS
PLATELET # BLD AUTO: 318 THOUSANDS/UL (ref 149–390)
PMV BLD AUTO: 12.3 FL (ref 8.9–12.7)
POTASSIUM SERPL-SCNC: 3.4 MMOL/L (ref 3.5–5.3)
PROT SERPL-MCNC: 8 G/DL (ref 6.4–8.2)
PROTHROMBIN TIME: 15.5 SECONDS (ref 11.6–14.5)
RBC # BLD AUTO: 3.95 MILLION/UL (ref 3.81–5.12)
SODIUM SERPL-SCNC: 136 MMOL/L (ref 136–145)
WBC # BLD AUTO: 27.7 THOUSAND/UL (ref 4.31–10.16)

## 2022-01-22 PROCEDURE — 93005 ELECTROCARDIOGRAM TRACING: CPT

## 2022-01-22 PROCEDURE — 85025 COMPLETE CBC W/AUTO DIFF WBC: CPT | Performed by: EMERGENCY MEDICINE

## 2022-01-22 PROCEDURE — 99285 EMERGENCY DEPT VISIT HI MDM: CPT

## 2022-01-22 PROCEDURE — 0241U HB NFCT DS VIR RESP RNA 4 TRGT: CPT

## 2022-01-22 PROCEDURE — 1124F ACP DISCUSS-NO DSCNMKR DOCD: CPT | Performed by: EMERGENCY MEDICINE

## 2022-01-22 PROCEDURE — 85730 THROMBOPLASTIN TIME PARTIAL: CPT | Performed by: EMERGENCY MEDICINE

## 2022-01-22 PROCEDURE — 36415 COLL VENOUS BLD VENIPUNCTURE: CPT | Performed by: EMERGENCY MEDICINE

## 2022-01-22 PROCEDURE — 96361 HYDRATE IV INFUSION ADD-ON: CPT

## 2022-01-22 PROCEDURE — 99285 EMERGENCY DEPT VISIT HI MDM: CPT | Performed by: EMERGENCY MEDICINE

## 2022-01-22 PROCEDURE — 80053 COMPREHEN METABOLIC PANEL: CPT | Performed by: EMERGENCY MEDICINE

## 2022-01-22 PROCEDURE — 85610 PROTHROMBIN TIME: CPT | Performed by: EMERGENCY MEDICINE

## 2022-01-22 PROCEDURE — 87040 BLOOD CULTURE FOR BACTERIA: CPT | Performed by: EMERGENCY MEDICINE

## 2022-01-22 PROCEDURE — 83605 ASSAY OF LACTIC ACID: CPT | Performed by: EMERGENCY MEDICINE

## 2022-01-22 PROCEDURE — 71045 X-RAY EXAM CHEST 1 VIEW: CPT

## 2022-01-22 PROCEDURE — 96365 THER/PROPH/DIAG IV INF INIT: CPT

## 2022-01-22 RX ORDER — ALBUTEROL SULFATE 90 UG/1
2 AEROSOL, METERED RESPIRATORY (INHALATION) 4 TIMES DAILY
Status: CANCELLED | OUTPATIENT
Start: 2022-01-22

## 2022-01-22 RX ORDER — SODIUM CHLORIDE 9 MG/ML
75 INJECTION, SOLUTION INTRAVENOUS CONTINUOUS
Status: DISCONTINUED | OUTPATIENT
Start: 2022-01-22 | End: 2022-01-22

## 2022-01-22 RX ORDER — POTASSIUM CHLORIDE AND SODIUM CHLORIDE 900; 300 MG/100ML; MG/100ML
75 INJECTION, SOLUTION INTRAVENOUS CONTINUOUS
Status: DISCONTINUED | OUTPATIENT
Start: 2022-01-22 | End: 2022-01-23

## 2022-01-22 RX ORDER — POTASSIUM CHLORIDE 20 MEQ/1
40 TABLET, EXTENDED RELEASE ORAL ONCE
Status: DISCONTINUED | OUTPATIENT
Start: 2022-01-22 | End: 2022-01-26 | Stop reason: HOSPADM

## 2022-01-22 RX ADMIN — SODIUM CHLORIDE 1000 ML: 0.9 INJECTION, SOLUTION INTRAVENOUS at 20:11

## 2022-01-22 RX ADMIN — POTASSIUM CHLORIDE AND SODIUM CHLORIDE 75 ML/HR: 900; 300 INJECTION, SOLUTION INTRAVENOUS at 22:41

## 2022-01-22 RX ADMIN — VANCOMYCIN HYDROCHLORIDE 1250 MG: 10 INJECTION, POWDER, LYOPHILIZED, FOR SOLUTION INTRAVENOUS at 20:51

## 2022-01-23 ENCOUNTER — APPOINTMENT (INPATIENT)
Dept: RADIOLOGY | Facility: HOSPITAL | Age: 77
DRG: 871 | End: 2022-01-23
Payer: MEDICARE

## 2022-01-23 PROBLEM — Z28.21 COVID-19 VACCINATION DECLINED: Status: ACTIVE | Noted: 2022-01-23

## 2022-01-23 PROBLEM — J12.82 PNEUMONIA DUE TO COVID-19 VIRUS: Status: ACTIVE | Noted: 2022-01-23

## 2022-01-23 PROBLEM — U07.1 COVID-19: Status: ACTIVE | Noted: 2022-01-23

## 2022-01-23 LAB
ALBUMIN SERPL BCP-MCNC: 2.2 G/DL (ref 3.5–5)
ALP SERPL-CCNC: 107 U/L (ref 46–116)
ALT SERPL W P-5'-P-CCNC: 76 U/L (ref 12–78)
ANION GAP SERPL CALCULATED.3IONS-SCNC: 10 MMOL/L (ref 4–13)
AST SERPL W P-5'-P-CCNC: 121 U/L (ref 5–45)
BACTERIA UR QL AUTO: ABNORMAL /HPF
BASOPHILS # BLD AUTO: 0.08 THOUSANDS/ΜL (ref 0–0.1)
BASOPHILS NFR BLD AUTO: 0 % (ref 0–1)
BILIRUB SERPL-MCNC: 0.85 MG/DL (ref 0.2–1)
BILIRUB UR QL STRIP: NEGATIVE
BUN SERPL-MCNC: 15 MG/DL (ref 5–25)
CALCIUM ALBUM COR SERPL-MCNC: 9.6 MG/DL (ref 8.3–10.1)
CALCIUM SERPL-MCNC: 8.2 MG/DL (ref 8.3–10.1)
CHLORIDE SERPL-SCNC: 101 MMOL/L (ref 100–108)
CLARITY UR: CLEAR
CO2 SERPL-SCNC: 28 MMOL/L (ref 21–32)
COLOR UR: YELLOW
CREAT SERPL-MCNC: 1.09 MG/DL (ref 0.6–1.3)
CRP SERPL QL: 315.1 MG/L
D DIMER PPP FEU-MCNC: 6.13 UG/ML FEU
EOSINOPHIL # BLD AUTO: 0.01 THOUSAND/ΜL (ref 0–0.61)
EOSINOPHIL NFR BLD AUTO: 0 % (ref 0–6)
ERYTHROCYTE [DISTWIDTH] IN BLOOD BY AUTOMATED COUNT: 13.8 % (ref 11.6–15.1)
FLUAV RNA RESP QL NAA+PROBE: NEGATIVE
FLUBV RNA RESP QL NAA+PROBE: NEGATIVE
GFR SERPL CREATININE-BSD FRML MDRD: 49 ML/MIN/1.73SQ M
GLUCOSE SERPL-MCNC: 122 MG/DL (ref 65–140)
GLUCOSE UR STRIP-MCNC: NEGATIVE MG/DL
HCT VFR BLD AUTO: 37.6 % (ref 34.8–46.1)
HGB BLD-MCNC: 11.5 G/DL (ref 11.5–15.4)
HGB UR QL STRIP.AUTO: ABNORMAL
HYALINE CASTS #/AREA URNS LPF: ABNORMAL /LPF
IMM GRANULOCYTES # BLD AUTO: 0.16 THOUSAND/UL (ref 0–0.2)
IMM GRANULOCYTES NFR BLD AUTO: 1 % (ref 0–2)
INR PPP: 1.26 (ref 0.84–1.19)
KETONES UR STRIP-MCNC: NEGATIVE MG/DL
LEUKOCYTE ESTERASE UR QL STRIP: ABNORMAL
LYMPHOCYTES # BLD AUTO: 1.06 THOUSANDS/ΜL (ref 0.6–4.47)
LYMPHOCYTES NFR BLD AUTO: 4 % (ref 14–44)
MAGNESIUM SERPL-MCNC: 2 MG/DL (ref 1.6–2.6)
MCH RBC QN AUTO: 31.3 PG (ref 26.8–34.3)
MCHC RBC AUTO-ENTMCNC: 30.6 G/DL (ref 31.4–37.4)
MCV RBC AUTO: 103 FL (ref 82–98)
MONOCYTES # BLD AUTO: 1.78 THOUSAND/ΜL (ref 0.17–1.22)
MONOCYTES NFR BLD AUTO: 7 % (ref 4–12)
MUCOUS THREADS UR QL AUTO: ABNORMAL
NEUTROPHILS # BLD AUTO: 23.41 THOUSANDS/ΜL (ref 1.85–7.62)
NEUTS SEG NFR BLD AUTO: 88 % (ref 43–75)
NITRITE UR QL STRIP: NEGATIVE
NON-SQ EPI CELLS URNS QL MICRO: ABNORMAL /HPF
NRBC BLD AUTO-RTO: 0 /100 WBCS
PH UR STRIP.AUTO: 6 [PH]
PHOSPHATE SERPL-MCNC: 3.5 MG/DL (ref 2.3–4.1)
PLATELET # BLD AUTO: 294 THOUSANDS/UL (ref 149–390)
PMV BLD AUTO: 13 FL (ref 8.9–12.7)
POTASSIUM SERPL-SCNC: 4.1 MMOL/L (ref 3.5–5.3)
PROT SERPL-MCNC: 7.2 G/DL (ref 6.4–8.2)
PROT UR STRIP-MCNC: ABNORMAL MG/DL
PROTHROMBIN TIME: 15.5 SECONDS (ref 11.6–14.5)
RBC # BLD AUTO: 3.67 MILLION/UL (ref 3.81–5.12)
RBC #/AREA URNS AUTO: ABNORMAL /HPF
RSV RNA RESP QL NAA+PROBE: NEGATIVE
SARS-COV-2 RNA RESP QL NAA+PROBE: POSITIVE
SODIUM SERPL-SCNC: 139 MMOL/L (ref 136–145)
SP GR UR STRIP.AUTO: 1.01 (ref 1–1.03)
UROBILINOGEN UR QL STRIP.AUTO: 2 E.U./DL
WBC # BLD AUTO: 26.5 THOUSAND/UL (ref 4.31–10.16)
WBC #/AREA URNS AUTO: ABNORMAL /HPF

## 2022-01-23 PROCEDURE — 85379 FIBRIN DEGRADATION QUANT: CPT | Performed by: STUDENT IN AN ORGANIZED HEALTH CARE EDUCATION/TRAINING PROGRAM

## 2022-01-23 PROCEDURE — 85025 COMPLETE CBC W/AUTO DIFF WBC: CPT | Performed by: STUDENT IN AN ORGANIZED HEALTH CARE EDUCATION/TRAINING PROGRAM

## 2022-01-23 PROCEDURE — G1004 CDSM NDSC: HCPCS

## 2022-01-23 PROCEDURE — 85610 PROTHROMBIN TIME: CPT | Performed by: STUDENT IN AN ORGANIZED HEALTH CARE EDUCATION/TRAINING PROGRAM

## 2022-01-23 PROCEDURE — 99223 1ST HOSP IP/OBS HIGH 75: CPT | Performed by: STUDENT IN AN ORGANIZED HEALTH CARE EDUCATION/TRAINING PROGRAM

## 2022-01-23 PROCEDURE — XW033E5 INTRODUCTION OF REMDESIVIR ANTI-INFECTIVE INTO PERIPHERAL VEIN, PERCUTANEOUS APPROACH, NEW TECHNOLOGY GROUP 5: ICD-10-PCS | Performed by: STUDENT IN AN ORGANIZED HEALTH CARE EDUCATION/TRAINING PROGRAM

## 2022-01-23 PROCEDURE — 71275 CT ANGIOGRAPHY CHEST: CPT

## 2022-01-23 PROCEDURE — 87086 URINE CULTURE/COLONY COUNT: CPT

## 2022-01-23 PROCEDURE — NC001 PR NO CHARGE: Performed by: STUDENT IN AN ORGANIZED HEALTH CARE EDUCATION/TRAINING PROGRAM

## 2022-01-23 PROCEDURE — 83735 ASSAY OF MAGNESIUM: CPT | Performed by: STUDENT IN AN ORGANIZED HEALTH CARE EDUCATION/TRAINING PROGRAM

## 2022-01-23 PROCEDURE — 84100 ASSAY OF PHOSPHORUS: CPT | Performed by: STUDENT IN AN ORGANIZED HEALTH CARE EDUCATION/TRAINING PROGRAM

## 2022-01-23 PROCEDURE — 80053 COMPREHEN METABOLIC PANEL: CPT | Performed by: STUDENT IN AN ORGANIZED HEALTH CARE EDUCATION/TRAINING PROGRAM

## 2022-01-23 PROCEDURE — 86140 C-REACTIVE PROTEIN: CPT | Performed by: STUDENT IN AN ORGANIZED HEALTH CARE EDUCATION/TRAINING PROGRAM

## 2022-01-23 PROCEDURE — 81001 URINALYSIS AUTO W/SCOPE: CPT | Performed by: EMERGENCY MEDICINE

## 2022-01-23 RX ORDER — HEPARIN SODIUM 1000 [USP'U]/ML
4000 INJECTION, SOLUTION INTRAVENOUS; SUBCUTANEOUS ONCE
Status: COMPLETED | OUTPATIENT
Start: 2022-01-23 | End: 2022-01-23

## 2022-01-23 RX ORDER — HEPARIN SODIUM 10000 [USP'U]/100ML
3-30 INJECTION, SOLUTION INTRAVENOUS
Status: DISCONTINUED | OUTPATIENT
Start: 2022-01-23 | End: 2022-01-25

## 2022-01-23 RX ORDER — ONDANSETRON 2 MG/ML
4 INJECTION INTRAMUSCULAR; INTRAVENOUS EVERY 6 HOURS PRN
Status: DISCONTINUED | OUTPATIENT
Start: 2022-01-23 | End: 2022-01-26 | Stop reason: HOSPADM

## 2022-01-23 RX ORDER — HEPARIN SODIUM 10000 [USP'U]/100ML
3-20 INJECTION, SOLUTION INTRAVENOUS
Status: DISCONTINUED | OUTPATIENT
Start: 2022-01-23 | End: 2022-01-23

## 2022-01-23 RX ORDER — DEXAMETHASONE SODIUM PHOSPHATE 4 MG/ML
6 INJECTION, SOLUTION INTRA-ARTICULAR; INTRALESIONAL; INTRAMUSCULAR; INTRAVENOUS; SOFT TISSUE EVERY 24 HOURS
Status: DISCONTINUED | OUTPATIENT
Start: 2022-01-23 | End: 2022-01-26 | Stop reason: HOSPADM

## 2022-01-23 RX ORDER — ATORVASTATIN CALCIUM 40 MG/1
40 TABLET, FILM COATED ORAL DAILY
Status: DISCONTINUED | OUTPATIENT
Start: 2022-01-23 | End: 2022-01-26 | Stop reason: HOSPADM

## 2022-01-23 RX ORDER — HEPARIN SODIUM 1000 [USP'U]/ML
2000 INJECTION, SOLUTION INTRAVENOUS; SUBCUTANEOUS
Status: DISCONTINUED | OUTPATIENT
Start: 2022-01-23 | End: 2022-01-23

## 2022-01-23 RX ORDER — VANCOMYCIN HYDROCHLORIDE 1 G/200ML
15 INJECTION, SOLUTION INTRAVENOUS EVERY 24 HOURS
Status: DISCONTINUED | OUTPATIENT
Start: 2022-01-23 | End: 2022-01-24

## 2022-01-23 RX ORDER — HEPARIN SODIUM 1000 [USP'U]/ML
4000 INJECTION, SOLUTION INTRAVENOUS; SUBCUTANEOUS
Status: DISCONTINUED | OUTPATIENT
Start: 2022-01-23 | End: 2022-01-23

## 2022-01-23 RX ORDER — HEPARIN SODIUM 1000 [USP'U]/ML
6400 INJECTION, SOLUTION INTRAVENOUS; SUBCUTANEOUS
Status: DISCONTINUED | OUTPATIENT
Start: 2022-01-23 | End: 2022-01-25

## 2022-01-23 RX ORDER — HEPARIN SODIUM 1000 [USP'U]/ML
6400 INJECTION, SOLUTION INTRAVENOUS; SUBCUTANEOUS ONCE
Status: COMPLETED | OUTPATIENT
Start: 2022-01-23 | End: 2022-01-24

## 2022-01-23 RX ORDER — HEPARIN SODIUM 1000 [USP'U]/ML
3200 INJECTION, SOLUTION INTRAVENOUS; SUBCUTANEOUS
Status: DISCONTINUED | OUTPATIENT
Start: 2022-01-23 | End: 2022-01-25

## 2022-01-23 RX ORDER — FLUTICASONE FUROATE AND VILANTEROL 100; 25 UG/1; UG/1
1 POWDER RESPIRATORY (INHALATION) DAILY
Status: DISCONTINUED | OUTPATIENT
Start: 2022-01-23 | End: 2022-01-26 | Stop reason: HOSPADM

## 2022-01-23 RX ORDER — ACETAMINOPHEN 325 MG/1
650 TABLET ORAL EVERY 6 HOURS PRN
Status: DISCONTINUED | OUTPATIENT
Start: 2022-01-23 | End: 2022-01-23

## 2022-01-23 RX ORDER — ASPIRIN 81 MG/1
81 TABLET ORAL DAILY
Status: DISCONTINUED | OUTPATIENT
Start: 2022-01-23 | End: 2022-01-26

## 2022-01-23 RX ORDER — DOCUSATE SODIUM 100 MG/1
100 CAPSULE, LIQUID FILLED ORAL 2 TIMES DAILY
Status: DISCONTINUED | OUTPATIENT
Start: 2022-01-23 | End: 2022-01-26 | Stop reason: HOSPADM

## 2022-01-23 RX ORDER — B-COMPLEX WITH VITAMIN C
1 TABLET ORAL
Status: DISCONTINUED | OUTPATIENT
Start: 2022-01-23 | End: 2022-01-26 | Stop reason: HOSPADM

## 2022-01-23 RX ORDER — LISINOPRIL 5 MG/1
5 TABLET ORAL DAILY
Status: DISCONTINUED | OUTPATIENT
Start: 2022-01-23 | End: 2022-01-26 | Stop reason: HOSPADM

## 2022-01-23 RX ORDER — IPRATROPIUM BROMIDE AND ALBUTEROL SULFATE 2.5; .5 MG/3ML; MG/3ML
3 SOLUTION RESPIRATORY (INHALATION) EVERY 4 HOURS PRN
Status: DISCONTINUED | OUTPATIENT
Start: 2022-01-23 | End: 2022-01-26 | Stop reason: HOSPADM

## 2022-01-23 RX ADMIN — HEPARIN SODIUM 4000 UNITS: 1000 INJECTION INTRAVENOUS; SUBCUTANEOUS at 18:19

## 2022-01-23 RX ADMIN — IOHEXOL 100 ML: 350 INJECTION, SOLUTION INTRAVENOUS at 22:24

## 2022-01-23 RX ADMIN — ATORVASTATIN CALCIUM 40 MG: 40 TABLET, FILM COATED ORAL at 10:09

## 2022-01-23 RX ADMIN — ACETAMINOPHEN 650 MG: 325 TABLET, FILM COATED ORAL at 10:09

## 2022-01-23 RX ADMIN — Medication 12.5 MG: at 10:09

## 2022-01-23 RX ADMIN — DOCUSATE SODIUM 100 MG: 100 CAPSULE ORAL at 10:09

## 2022-01-23 RX ADMIN — VANCOMYCIN HYDROCHLORIDE 1000 MG: 1 INJECTION, SOLUTION INTRAVENOUS at 22:55

## 2022-01-23 RX ADMIN — REMDESIVIR 200 MG: 100 INJECTION, POWDER, LYOPHILIZED, FOR SOLUTION INTRAVENOUS at 07:46

## 2022-01-23 RX ADMIN — Medication 1 TABLET: at 10:09

## 2022-01-23 RX ADMIN — DOCUSATE SODIUM 100 MG: 100 CAPSULE ORAL at 17:56

## 2022-01-23 RX ADMIN — SODIUM CHLORIDE 1000 ML: 0.9 INJECTION, SOLUTION INTRAVENOUS at 15:41

## 2022-01-23 RX ADMIN — FLUTICASONE FUROATE AND VILANTEROL TRIFENATATE 1 PUFF: 100; 25 POWDER RESPIRATORY (INHALATION) at 10:10

## 2022-01-23 RX ADMIN — DEXAMETHASONE SODIUM PHOSPHATE 6 MG: 4 INJECTION, SOLUTION INTRA-ARTICULAR; INTRALESIONAL; INTRAMUSCULAR; INTRAVENOUS; SOFT TISSUE at 06:41

## 2022-01-23 RX ADMIN — SODIUM CHLORIDE 1000 ML: 0.9 INJECTION, SOLUTION INTRAVENOUS at 22:59

## 2022-01-23 RX ADMIN — HEPARIN SODIUM 12 UNITS/KG/HR: 10000 INJECTION, SOLUTION INTRAVENOUS at 18:20

## 2022-01-23 RX ADMIN — ASPIRIN 81 MG: 81 TABLET, COATED ORAL at 10:09

## 2022-01-23 RX ADMIN — ENOXAPARIN SODIUM 30 MG: 30 INJECTION SUBCUTANEOUS at 10:08

## 2022-01-23 RX ADMIN — LISINOPRIL 5 MG: 5 TABLET ORAL at 10:09

## 2022-01-23 NOTE — H&P
H&P Exam - Janet Mcnamara 68 y o  female MRN: [id number]    Unit/Bed#: ED 07 Encounter: [id number]    Patient is a 26-year-old female with a history of CKD 3, hypertension, hyperlipidemia, emphysema requiring 3 L of oxygen at home who presents with worsening right lower extremity redness warmth and pain  Admitted for right lower extremity cellulitis to the services of Dr Chiara Diaz, length of stay expected to be greater than 2 midnights  Assessment:  Cellulitis of right lower extremity  Assessment & Plan  Patient presented with worsening right and left thigh warmth and redness  Found to be febrile and septic in the ER  She received does of vancomycin in the ED              · Continue with vancomycin  · Monitor for spread  · Other management under sepsis    * Sepsis with organ dysfunction [facility])  Assessment & Plan  Patient is sirs positive with tachycardia, tachypnea, leukocytosis, elevated PT/INR, elevated ALT, ANNA in the setting of right lower extremity cellulitis  Lactate WNL    S/p vancomycin x1, NS 1 L IV x1  · Continue with vancomycin, renally dosed  · Monitor CBC/vitals  · Fluids as noted   · Follow-up blood cultures  · Follow-up UA with reflex to culture  · covid test  · Sputum cx    Acute renal failure superimposed on stage 3 chronic kidney disease [facility])  Assessment & Plan  Lab Results   Component Value Date    EGFR 34 01/22/2022    EGFR 61 10/25/2019    EGFR 61 12/28/2018    CREATININE 1 48 (H) 01/22/2022    CREATININE 0 93 10/25/2019    CREATININE 0 93 12/28/2018     Baseline creatinine around 1, creatinine on admission 1 48, in the setting of sepsis  · Avoid nephrotoxic agents  · Renally dose all meds  · sodium chloride 0 9 % with KCl 40 mEq/L infusion  · Monitor BMP  · Avoid hypotension    Chronic respiratory failure with hypoxia [facility])  Assessment & Plan  Patient is on 3 L of oxygen at baseline in the setting of her emphysema  · Continue her home oxygen    Mixed hyperlipidemia  Assessment & Plan  Last lipid panel in 2020 LDL 66, triglycerides 138, HDL 47  · Continue home Lipitor      CAD (coronary artery disease)  Assessment & Plan  Prior chest pain and noncritical CAD with normal Lexiscan nuclear stress study on 12/28/2018  No chest pain on admission, stable  - continue home aspirin 81 mg tablet daily    Essential hypertension  Assessment & Plan  Blood pressure stable  · Continue home lisinopril, metoprolol    Centrilobular emphysema ([address] )  Assessment & Plan  Patient has stage 3 severe emphysema  Last PFTs were done in June 2019  Forced vital capacity was 1 60 L or 67% of predicted, FEV1 is 0 7 L or 39% obstruction ratio 44%  · DuoNeb Q4 prn  · Continue home trelegy 1 puff daily  · Monitor respiratory status    Global: SQL/SCDs, NS w/ 40 KCL 75 cc/hr, replete lytes prn, reg diet    History of Present Illness   Patient is a 66-year-old female with a history of CKD 3, hypertension, hyperlipidemia, emphysema requiring 3 L of oxygen at home who presents with worsening right lower extremity redness warmth and pain  Rash began about 3 weeks ago and has gotten progressively larger and more painful  She also reports having been more short of breath over the past week  She denies any other systemic symptoms including fever, chills, fatigue, chest pain, abdominal pain, nausea, vomiting, diarrhea, dysuria, headache, or dizziness  Patient has not seen a doctor since before Matthewshanel  She is not taking her home prescribed atorvastatin, lisinopril or metoprolol because her doctor would not refill these medications without evaluating her and she did not want to leave the home due to Leeann  She has been taking her aspirin, and albuterol nebulizer  She is not COVID vaccinated  ED:  Found to be septic with exam consistent with right lower extremity cellulitis  Given a dose of vancomycin and a L bolus of normal saline    Review of Systems   Constitutional: Negative for chills and fever     HENT: Negative for ear pain, rhinorrhea and sore throat  Eyes: Negative for pain and visual disturbance  Respiratory: Positive for shortness of breath  Negative for cough  Cardiovascular: Negative for chest pain and palpitations  Gastrointestinal: Negative for abdominal pain, constipation, diarrhea, nausea and vomiting  Genitourinary: Negative for dysuria and hematuria  Musculoskeletal: Negative for arthralgias and back pain  Skin: Positive for color change and rash  Neurological: Negative for dizziness, seizures, syncope, light-headedness and headaches  All other systems reviewed and are negative  Historical Information   Past Medical History:   Diagnosis Date    Breast lump     CH[facility]congestive [facility] failure) [facility])     COPD (chronic obstructive pulmonary disease) [facility])     Diverticulitis     Emphysema of lung ([facility])     History of chronic obstructive lung disease     Non-productive cough     SOBOE (shortness of breath on exertion)      History reviewed  No pertinent surgical history  Social History   Social History     Substance and Sexual Activity   Alcohol Use No     Social History     Substance and Sexual Activity   Drug Use No     Social History     Tobacco Use   Smoking Status Former Smoker    Packs/day: 1 50    Years: 55 00    Pack years: 82 50    Quit date: 2010    Years since quittin 6   Smokeless Tobacco Never Used   Tobacco Comment    ex cigarette smoker     E-Cigarette Use: Never User     E-Cigarette/Vaping Substances       Family History:   Family History   Problem Relation Age of Onset    Stroke Mother     Diabetes Father     Heart disease Father     Emphysema Family     Stroke Family         syndrome    Diabetes Sister        Meds/Allergies   PTA meds:   Prior to Admission Medications   Prescriptions Last Dose Informant Patient Reported? Taking?    Calcium 600 MG tablet 2022 at Unknown time Self Yes Yes   Sig: Take by mouth   DAILY MULTIPLE VITAMINS PO 2022 at Unknown time Self Yes Yes   Sig: Take by mouth   Trelegy Ellipta 100-62 5-25 MCG/INH inhaler More than a month at Unknown time  No No   Sig: INHALE 1 PUFF ONCE DAILY RINSE MOUTH AFTER USE   albuterol (2 5 mg/3 mL) 0 083 % nebulizer solution   No No   Sig: USE 1 VIAL IN NEBULIZER 3 TIMES DAILY   albuterol (Ventolin HFA) 90 mcg/act inhaler More than a month at Unknown time  No No   Sig: Inhale 2 puffs 4 (four) times a day   aspirin (ASPIR-LOW) 81 mg EC tablet 1/22/2022 at Unknown time Self Yes Yes   Sig: Take 1 tablet by mouth daily   atorvastatin (LIPITOR) 40 mg tablet More than a month at Unknown time  No No   Sig: Take 1 tablet by mouth once daily   calcium carbonate-vitamin D (OSCAL-D) 500 mg-200 units per tablet Not Taking at Unknown time  Yes No   Sig: Take 1 tablet by mouth 2 (two) times a day with meals   Patient not taking: Reported on 1/22/2022    lisinopril (ZESTRIL) 5 mg tablet More than a month at Unknown time  No No   Sig: Take 1 tablet (5 mg total) by mouth daily   metoprolol tartrate (LOPRESSOR) 25 mg tablet More than a month at Unknown time  No No   Sig: Take 1/2 (one-half) tablet by mouth twice daily      Facility-Administered Medications: None     No Known Allergies    Objective   First Vitals:   Blood Pressure: 134/77 (01/22/22 1921)  Pulse: (!) 118 (01/22/22 1921)  Temperature: 100 3 °F (37 9 °C) (01/22/22 1921)  Temp Source: Tympanic (01/22/22 1921)  Respirations: (!) 28 (01/22/22 1921)  Weight - Scale: 76 3 kg (168 lb 3 4 oz) (01/22/22 1930)  SpO2: (!) 88 % (01/22/22 1939)    Current Vitals:   Blood Pressure: 129/59 (01/22/22 2242)  Pulse: 93 (01/22/22 2242)  Temperature: 99 8 °F (37 7 °C) (01/22/22 2242)  Temp Source: Tympanic (01/22/22 2242)  Respirations: 18 (01/22/22 2242)  Weight - Scale: 76 3 kg (168 lb 3 4 oz) (01/22/22 1930)  SpO2: 98 % (01/22/22 2242)      Intake/Output Summary (Last 24 hours) at 1/22/2022 2305  Last data filed at 1/22/2022 2240  Gross per 24 hour   Intake 1250 ml   Output -- Net 1250 ml       Invasive Devices  Report    Peripheral Intravenous Line            Peripheral IV 01/22/22 Right Antecubital <1 day                Physical Exam  Vitals and nursing note reviewed  Constitutional:       General: She is not in acute distress  Appearance: She is well-developed  She is obese  She is not ill-appearing, toxic-appearing or diaphoretic  HENT:      Head: Normocephalic and atraumatic  Eyes:      Conjunctiva/sclera: Conjunctivae normal    Cardiovascular:      Rate and Rhythm: Normal rate and regular rhythm  Heart sounds: No murmur heard  Pulmonary:      Effort: Pulmonary effort is normal  No respiratory distress  Breath sounds: Decreased breath sounds and rales (Inspiratory crackles at left base) present  Abdominal:      Palpations: Abdomen is soft  Tenderness: There is no abdominal tenderness  Musculoskeletal:         General: Tenderness ( on thighs over areas of erythema) present  Cervical back: Neck supple  Right lower leg: No edema  Left lower leg: No edema  Skin:     General: Skin is warm and dry  Capillary Refill: Capillary refill takes less than 2 seconds  Comments: Area of redness, warmth, tenderness with induration over anterior thigh and medial aspect of right knee   Neurological:      Mental Status: She is alert and oriented to person, place, and time     Psychiatric:         Behavior: Behavior normal          Lab Results:   Results for orders placed or performed during the hospital encounter of 01/22/22   CBC and differential   Result Value Ref Range    WBC 27 70 (H) 4 31 - 10 16 Thousand/uL    RBC 3 95 3 81 - 5 12 Million/uL    Hemoglobin 11 9 11 5 - 15 4 g/dL    Hematocrit 39 8 34 8 - 46 1 %     (H) 82 - 98 fL    MCH 30 1 26 8 - 34 3 pg    MCHC 29 9 (L) 31 4 - 37 4 g/dL    RDW 13 6 11 6 - 15 1 %    MPV 12 3 8 9 - 12 7 fL    Platelets 449 206 - 349 Thousands/uL    nRBC 0 /100 WBCs    Neutrophils Relative 90 (H) 43 - 75 %    Immat GRANS % 1 0 - 2 %    Lymphocytes Relative 3 (L) 14 - 44 %    Monocytes Relative 6 4 - 12 %    Eosinophils Relative 0 0 - 6 %    Basophils Relative 0 0 - 1 %    Neutrophils Absolute 24 86 (H) 1 85 - 7 62 Thousands/µL    Immature Grans Absolute 0 20 0 00 - 0 20 Thousand/uL    Lymphocytes Absolute 0 90 0 60 - 4 47 Thousands/µL    Monocytes Absolute 1 65 (H) 0 17 - 1 22 Thousand/µL    Eosinophils Absolute 0 00 0 00 - 0 61 Thousand/µL    Basophils Absolute 0 09 0 00 - 0 10 Thousands/µL   Protime-INR   Result Value Ref Range    Protime 15 5 (H) 11 6 - 14 5 seconds    INR 1 26 (H) 0 84 - 1 19   APTT   Result Value Ref Range    PTT 36 23 - 37 seconds   Comprehensive metabolic panel   Result Value Ref Range    Sodium 136 136 - 145 mmol/L    Potassium 3 4 (L) 3 5 - 5 3 mmol/L    Chloride 95 (L) 100 - 108 mmol/L    CO2 33 (H) 21 - 32 mmol/L    ANION GAP 8 4 - 13 mmol/L    BUN 18 5 - 25 mg/dL    Creatinine 1 48 (H) 0 60 - 1 30 mg/dL    Glucose 132 65 - 140 mg/dL    Calcium 9 0 8 3 - 10 1 mg/dL    Corrected Calcium 10 2 (H) 8 3 - 10 1 mg/dL    AST 70 (H) 5 - 45 U/L    ALT 63 12 - 78 U/L    Alkaline Phosphatase 103 46 - 116 U/L    Total Protein 8 0 6 4 - 8 2 g/dL    Albumin 2 5 (L) 3 5 - 5 0 g/dL    Total Bilirubin 1 02 (H) 0 20 - 1 00 mg/dL    eGFR 34 ml/min/1 73sq m   Lactic acid   Result Value Ref Range    LACTIC ACID 1 8 0 5 - 2 0 mmol/L       Imaging:   XR chest portable    (Results Pending)       EKG, Pathology, and Other Studies:     Code Status: Prior  Advance Directive and Living Will:      Power of :    POLST:      Counseling / Coordination of Care: Total floor / unit time spent today 30 minutes  and Greater than 50% of total time was spent with the patient and / or family counseling and / or coordination of care  A description of the counseling / coordination of care: Discussed with attending on-call

## 2022-01-23 NOTE — ASSESSMENT & PLAN NOTE
Patient is on 3 L of oxygen at baseline in the setting of her emphysema  - Currently on  6 L NC (requires home O2 of 3; delta 3L)

## 2022-01-23 NOTE — ASSESSMENT & PLAN NOTE
Lab Results   Component Value Date    EGFR 34 01/22/2022    EGFR 61 10/25/2019    EGFR 61 12/28/2018    CREATININE 1 48 (H) 01/22/2022    CREATININE 0 93 10/25/2019    CREATININE 0 93 12/28/2018     Presented with Acute on Chronic CKD, which resolved   Baseline creatinine around 1, creatinine was on admission 1 48, in the setting of sepsis  · Limit nephrotoxic agents, Renally dose all meds, Monitor BMP, Avoid hypotension  · Give 1L before and after CTA to avoid repeat ANNA

## 2022-01-23 NOTE — ED PROVIDER NOTES
History  Chief Complaint   Patient presents with    Leg Swelling     c/o R thigh pain and swelling and redness for three weeks    Shortness of Breath     daughter states recently much more short of breath, no chest pain hx copd, on 3liters oxygen presently     Patient states she has been having pain and irritation in the anterior aspect of the right thigh for about 2 weeks or more  She denies any injury to the area  The area has become larger, warmer to the touch, red and painful  Patient did not know she had a fever on arrival   She denied fever chills recently  Patient was recently seen by family member who is concerned for a blood clot  Patient has O2 dependent COPD  Upon arrival on her own portable oxygen tank she was noted to be hypoxic with an SaO2 of 88%  With the patient was placed on nasal oxygen at 3 L in the ER, O2 sat elisabeth to 95-96%          Prior to Admission Medications   Prescriptions Last Dose Informant Patient Reported? Taking?    Calcium 600 MG tablet 1/22/2022 at Unknown time Self Yes Yes   Sig: Take by mouth   DAILY MULTIPLE VITAMINS PO 1/22/2022 at Unknown time Self Yes Yes   Sig: Take by mouth   Trelegy Ellipta 100-62 5-25 MCG/INH inhaler More than a month at Unknown time  No No   Sig: INHALE 1 PUFF ONCE DAILY RINSE MOUTH AFTER USE   albuterol (2 5 mg/3 mL) 0 083 % nebulizer solution   No No   Sig: USE 1 VIAL IN NEBULIZER 3 TIMES DAILY   albuterol (Ventolin HFA) 90 mcg/act inhaler More than a month at Unknown time  No No   Sig: Inhale 2 puffs 4 (four) times a day   aspirin (ASPIR-LOW) 81 mg EC tablet 1/22/2022 at Unknown time Self Yes Yes   Sig: Take 1 tablet by mouth daily   atorvastatin (LIPITOR) 40 mg tablet More than a month at Unknown time  No No   Sig: Take 1 tablet by mouth once daily   calcium carbonate-vitamin D (OSCAL-D) 500 mg-200 units per tablet Not Taking at Unknown time  Yes No   Sig: Take 1 tablet by mouth 2 (two) times a day with meals   Patient not taking: Reported on 2022    lisinopril (ZESTRIL) 5 mg tablet More than a month at Unknown time  No No   Sig: Take 1 tablet (5 mg total) by mouth daily   metoprolol tartrate (LOPRESSOR) 25 mg tablet More than a month at Unknown time  No No   Sig: Take 1/2 (one-half) tablet by mouth twice daily      Facility-Administered Medications: None       Past Medical History:   Diagnosis Date    Breast lump     CHF (congestive heart failure) ([facility])     COPD (chronic obstructive pulmonary disease) ([facility])     Diverticulitis     Emphysema of lung ([facility])     History of chronic obstructive lung disease     Non-productive cough     SOBOE (shortness of breath on exertion)        History reviewed  No pertinent surgical history  Family History   Problem Relation Age of Onset    Stroke Mother     Diabetes Father     Heart disease Father     Emphysema Family     Stroke Family         syndrome    Diabetes Sister      I have reviewed and agree with the history as documented  E-Cigarette/Vaping    E-Cigarette Use Never User      E-Cigarette/Vaping Substances     Social History     Tobacco Use    Smoking status: Former Smoker     Packs/day: 1 50     Years: 55 00     Pack years: 82 50     Quit date: 2010     Years since quittin 6    Smokeless tobacco: Never Used    Tobacco comment: ex cigarette smoker   Vaping Use    Vaping Use: Never used   Substance Use Topics    Alcohol use: No    Drug use: No       Review of Systems   Constitutional: Positive for fever  Negative for chills  HENT: Negative for congestion and sore throat  Eyes: Negative for visual disturbance  Respiratory: Positive for shortness of breath  Cardiovascular: Positive for leg swelling  Negative for chest pain  Gastrointestinal: Negative for abdominal pain and vomiting  Genitourinary: Negative for dysuria  Musculoskeletal: Negative for back pain  Skin: Positive for color change and rash  Neurological: Positive for weakness   Negative for headaches  Hematological: Does not bruise/bleed easily  Psychiatric/Behavioral: Negative for confusion  All other systems reviewed and are negative  Physical Exam  Physical Exam  Vitals and nursing note reviewed  Constitutional:       Appearance: She is well-developed  HENT:      Head: Normocephalic  Mouth/Throat:      Mouth: Mucous membranes are moist    Eyes:      Pupils: Pupils are equal, round, and reactive to light  Cardiovascular:      Rate and Rhythm: Regular rhythm  Tachycardia present  Pulses: Normal pulses  Pulmonary:      Effort: Pulmonary effort is normal       Breath sounds: Normal breath sounds  Chest:      Chest wall: Tenderness present  Abdominal:      Palpations: Abdomen is soft  Tenderness: There is no abdominal tenderness  Musculoskeletal:         General: Normal range of motion  Cervical back: Normal range of motion and neck supple  Comments: Patient has a localized area of indurated redness warm and tender to touch on the anterior aspect of the right thigh  There is also small area the medial aspect of the left knee with the same characteristics   Skin:     General: Skin is warm and dry  Capillary Refill: Capillary refill takes less than 2 seconds  Findings: Erythema present  Neurological:      General: No focal deficit present  Mental Status: She is alert and oriented to person, place, and time     Psychiatric:         Mood and Affect: Mood normal          Behavior: Behavior normal          Vital Signs  ED Triage Vitals   Temperature Pulse Respirations Blood Pressure SpO2   01/22/22 1921 01/22/22 1921 01/22/22 1921 01/22/22 1921 01/22/22 1939   100 3 °F (37 9 °C) (!) 118 (!) 28 134/77 (!) 88 %      Temp Source Heart Rate Source Patient Position - Orthostatic VS BP Location FiO2 (%)   01/22/22 1921 01/22/22 1921 01/22/22 1921 01/22/22 1921 --   Tympanic Monitor Sitting Left arm       Pain Score       01/22/22 1921       10 - Worst Possible Pain           Vitals:    01/22/22 1921 01/22/22 2045 01/22/22 2053 01/22/22 2100   BP: 134/77  129/59 129/59   Pulse: (!) 118 96 99 94   Patient Position - Orthostatic VS: Sitting  Lying          Visual Acuity      ED Medications  Medications   vancomycin (VANCOCIN) 1,250 mg in sodium chloride 0 9 % 250 mL IVPB (1,250 mg Intravenous New Bag 1/22/22 2051)   sodium chloride 0 9 % bolus 1,000 mL (1,000 mL Intravenous New Bag 1/22/22 2011)       Diagnostic Studies  Results Reviewed     Procedure Component Value Units Date/Time    Protime-INR [[id number]]  (Abnormal) Collected: 01/22/22 2005    Lab Status: Final result Specimen: Blood from Arm, Right Updated: 01/22/22 2101     Protime 15 5 seconds      INR 1 26    APTT [[id number]]  (Normal) Collected: 01/22/22 2005    Lab Status: Final result Specimen: Blood from Arm, Right Updated: 01/22/22 2101     PTT 36 seconds     Comprehensive metabolic panel [[id number]]  (Abnormal) Collected: 01/22/22 2005    Lab Status: Final result Specimen: Blood from Arm, Right Updated: 01/22/22 2100     Sodium 136 mmol/L      Potassium 3 4 mmol/L      Chloride 95 mmol/L      CO2 33 mmol/L      ANION GAP 8 mmol/L      BUN 18 mg/dL      Creatinine 1 48 mg/dL      Glucose 132 mg/dL      Calcium 9 0 mg/dL      Corrected Calcium 10 2 mg/dL      AST 70 U/L      ALT 63 U/L      Alkaline Phosphatase 103 U/L      Total Protein 8 0 g/dL      Albumin 2 5 g/dL      Total Bilirubin 1 02 mg/dL      eGFR 34 ml/min/1 73sq m     Narrative:      Dara guidelines for Chronic Kidney Disease (CKD):     Stage 1 with normal or high GFR (GFR > 90 mL/min/1 73 square meters)    Stage 2 Mild CKD (GFR = 60-89 mL/min/1 73 square meters)    Stage 3A Moderate CKD (GFR = 45-59 mL/min/1 73 square meters)    Stage 3B Moderate CKD (GFR = 30-44 mL/min/1 73 square meters)    Stage 4 Severe CKD (GFR = 15-29 mL/min/1 73 square meters)    Stage 5 End Stage CKD (GFR <15 mL/min/1 73 square meters)  Note: GFR calculation is accurate only with a steady state creatinine    CBC and differential [[id number]]  (Abnormal) Collected: 01/22/22 2005    Lab Status: Final result Specimen: Blood from Arm, Right Updated: 01/22/22 2045     WBC 27 70 Thousand/uL      RBC 3 95 Million/uL      Hemoglobin 11 9 g/dL      Hematocrit 39 8 %       fL      MCH 30 1 pg      MCHC 29 9 g/dL      RDW 13 6 %      MPV 12 3 fL      Platelets 475 Thousands/uL      nRBC 0 /100 WBCs      Neutrophils Relative 90 %      Immat GRANS % 1 %      Lymphocytes Relative 3 %      Monocytes Relative 6 %      Eosinophils Relative 0 %      Basophils Relative 0 %      Neutrophils Absolute 24 86 Thousands/µL      Immature Grans Absolute 0 20 Thousand/uL      Lymphocytes Absolute 0 90 Thousands/µL      Monocytes Absolute 1 65 Thousand/µL      Eosinophils Absolute 0 00 Thousand/µL      Basophils Absolute 0 09 Thousands/µL     Narrative: This is an appended report  These results have been appended to a previously verified report  Lactic acid [[id number]]  (Normal) Collected: 01/22/22 2005    Lab Status: Final result Specimen: Blood from Arm, Right Updated: 01/22/22 2033     LACTIC ACID 1 8 mmol/L     Narrative:      Result may be elevated if tourniquet was used during collection  Blood culture #1 [[id number]] Collected: 01/22/22 2005    Lab Status: In process Specimen: Blood from Arm, Right Updated: 01/22/22 2010    Blood culture #2 [[id number]] Collected: 01/22/22 2005    Lab Status:  In process Specimen: Blood from Arm, Right Updated: 01/22/22 2009    UA (URINE) with reflex to Scope [[id number]]     Lab Status: No result Specimen: Urine                  No orders to display              Procedures  ECG 12 Lead Documentation Only    Date/Time: 1/22/2022 8:17 PM  Performed by: Rizwana Plaza MD  Authorized by: Rizwana Plzaa MD     Indications / Diagnosis:  Fever  ECG reviewed by me, the ED Provider: yes    Patient location: ED  Interpretation:     Interpretation: normal    Rate:     ECG rate:  99    ECG rate assessment: normal    Rhythm:     Rhythm: sinus rhythm    Ectopy:     Ectopy: none    QRS:     QRS axis:  Normal    QRS intervals:  Normal  Conduction:     Conduction: normal    ST segments:     ST segments:  Normal  T waves:     T waves: normal               ED Course                                             MDM  Number of Diagnoses or Management Options  Cellulitis  Diagnosis management comments: Patient has cellulitis with fever  Will check for sepsis      Disposition  Final diagnoses:   Cellulitis     Time reflects when diagnosis was documented in both MDM as applicable and the Disposition within this note     Time User Action Codes Description Comment    1/22/2022  9:30 PM Sumaya Castellano [[id number]] Cellulitis       ED Disposition     ED Disposition Condition Date/Time Comment    Admit Stable Sat Jan 22, 2022  9:30 PM Case was discussed with [facility] family practice and the patient's admission status was agreed to be Admission Status: inpatient status to the service of Dr Mera Damon   Follow-up Information    None         Patient's Medications   Discharge Prescriptions    No medications on file       No discharge procedures on file      PDMP Review     None          ED Provider  Electronically Signed by           Gee Poole MD  01/22/22 1272

## 2022-01-23 NOTE — PROGRESS NOTES
Vancomycin Assessment    Connor Spencer is a 68 y o  female who is currently receiving vancomycin vancomycin 1250mg q12h for skin-soft tissue infection     Relevant clinical data and objective history reviewed:  Creatinine   Date Value Ref Range Status   01/22/2022 1 48 (H) 0 60 - 1 30 mg/dL Final     Comment:     Standardized to IDMS reference method   10/25/2019 0 93 0 60 - 1 30 mg/dL Final     Comment:     Standardized to IDMS reference method   12/28/2018 0 93 0 60 - 1 30 mg/dL Final     Comment:     Standardized to IDMS reference method     /73 (BP Location: Left arm)   Pulse 101   Temp 98 °F (36 7 °C) (Tympanic)   Resp 18   Wt 76 3 kg (168 lb 3 4 oz)   SpO2 95%   BMI 31 27 kg/m²   No intake/output data recorded  Lab Results   Component Value Date/Time    BUN 18 01/22/2022 08:05 PM    WBC 27 70 (H) 01/22/2022 08:05 PM    HGB 11 9 01/22/2022 08:05 PM    HCT 39 8 01/22/2022 08:05 PM     (H) 01/22/2022 08:05 PM     01/22/2022 08:05 PM     Temp Readings from Last 3 Encounters:   01/23/22 98 °F (36 7 °C) (Tympanic)   03/09/20 98 4 °F (36 9 °C) (Oral)   09/30/19 99 5 °F (37 5 °C) (Tympanic)     Vancomycin Days of Therapy: 1    Assessment/Plan  The patient is currently on vancomycin utilizing scheduled dosing based on adjusted body weight (due to obesity)  Baseline risks associated with therapy include: pre-existing renal impairment and advanced age  The patient is currently receiving vancomycin 1250mg q12h and after clinical evaluation will be changed to vancomycin 750mg q24h  Pharmacy will also follow closely for s/sx of nephrotoxicity, infusion reactions, and appropriateness of therapy  BMP and CBC will be ordered per protocol  Plan for trough as patient approaches steady state, prior to the 4th  dose at approximately 2030 1/24  Due to infection severity, will target a trough of 10-15 (mild infection/cellulitis)     Pharmacy will continue to follow the patients culture results and clinical progress daily      Florinda Strong, Pharmacist

## 2022-01-23 NOTE — PROGRESS NOTES
Vancomycin Assessment    Ariana Albarado is a 68 y o  female who is currently receiving vancomycin 750mg IV every 24 hours for skin-soft tissue infection     Relevant clinical data and objective history reviewed:  Creatinine   Date Value Ref Range Status   01/23/2022 1 09 0 60 - 1 30 mg/dL Final     Comment:     Standardized to IDMS reference method   01/22/2022 1 48 (H) 0 60 - 1 30 mg/dL Final     Comment:     Standardized to IDMS reference method   10/25/2019 0 93 0 60 - 1 30 mg/dL Final     Comment:     Standardized to IDMS reference method     /70   Pulse 100   Temp 100 4 °F (38 °C)   Resp 18   Ht 5' 1 5" (1 562 m)   Wt 78 1 kg (172 lb 2 9 oz)   SpO2 94%   BMI 32 01 kg/m²   I/O last 3 completed shifts: In: 1250 [IV Piggyback:1250]  Out: -   Lab Results   Component Value Date/Time    BUN 15 01/23/2022 05:37 AM    WBC 26 50 (H) 01/23/2022 05:37 AM    HGB 11 5 01/23/2022 05:37 AM    HCT 37 6 01/23/2022 05:37 AM     (H) 01/23/2022 05:37 AM     01/23/2022 05:37 AM     Temp Readings from Last 3 Encounters:   01/23/22 100 4 °F (38 °C)   03/09/20 98 4 °F (36 9 °C) (Oral)   09/30/19 99 5 °F (37 5 °C) (Tympanic)     Vancomycin Days of Therapy: 1    Assessment/Plan  The patient is currently on vancomycin utilizing scheduled dosing  Baseline risks associated with therapy include: pre-existing renal impairment, concomitant nephrotoxic medications, advanced age, and dehydration  The patient is receiving 750mg IV every 24 hours based on a goal of 10-15 (mild infection/cellulitis)  After clinical evaluation, the dose will be changed to 1000mg IV every 24 hours   Pharmacy will continue to follow closely for s/sx of nephrotoxicity, infusion reactions, and appropriateness of therapy  BMP and CBC will be ordered per protocol  Plan for trough as patient approaches steady state, prior to the 4th  dose at approximately 20:30 1/25/22   Pharmacy will continue to follow the patients culture results and clinical progress daily      Nury Conner, Pharmacist

## 2022-01-23 NOTE — ASSESSMENT & PLAN NOTE
Prior chest pain and noncritical CAD with normal Lexiscan nuclear stress study on 12/28/2018  No chest pain on admission, stable      - continue home aspirin 81 mg tablet daily

## 2022-01-23 NOTE — ASSESSMENT & PLAN NOTE
Patient has stage 3 severe emphysema  Last PFTs were done in June 2019  Forced vital capacity was 1 60 L or 67% of predicted, FEV1 is 0 7 L or 39% obstruction ratio 44%     · DuoNeb Q4 prn  · Continue home trelegy 1 puff daily  · Monitor respiratory status

## 2022-01-23 NOTE — ASSESSMENT & PLAN NOTE
COVID-19 Pneumonia  -Has Mild Severity Range infection /  Currently on 3 L NC (requires home O2 of 3; delta 3L)  Positive COVID 19 Date   Results from last 7 days   Lab Units 01/22/22  2252   SARS-COV-2  Positive*         Results from last 7 days   Lab Units 01/23/22  0537   D-DIMER QUANTITATIVE ug/ml FEU 6 13*   CRP mg/L 315 1*     -Steroid:  Dexamethasone Started 1/23  -Remdesivir:  Started 1/23  -Abx #:  Vancomycin for cellulitis  -VTE PPX:  Heparin (Low ACS)  -Baricitnib:  Not indicated    No Acute Hypoxemic Respiratory Failure  -Currently on Home 3 L NC   -titrate up / down for 02 sat 90  -continue continuous oximetry monitoring via [place] system  -continue proning therapy as indicated    Abnormal Chest Imaging:  -mild diffuse B/L GGO c/w clinical COVID 19  -CTA pending for elevated d-dimer and respiratory distress  -further follow up imaging as needed for changes in oxygen / clinical requirements      Pt reports mild symptoms  Dyspnea on exertion but at rest has no increased oxygen demand

## 2022-01-23 NOTE — ED NOTES
Patient's right thigh area noted to be reddened,hot to touch,no open areas noted or raised areas       Tristen Munoz RN  01/22/22 2598

## 2022-01-23 NOTE — ASSESSMENT & PLAN NOTE
Patient is sirs positive with tachycardia, tachypnea, leukocytosis, elevated PT/INR, elevated ALT, ANNA in the setting of right lower extremity cellulitis  Lactate WNL  S/p vancomycin x1, NS 1 L IV x1  · Continue with vancomycin, renally dosed  · Monitor CBC/vitals  · Fluids as noted   · Follow-up blood cultures  · Follow-up UA with reflex to culture  · covid test  · Sputum cx    24 hours without meeting SIRS criteria as leukocytosis remains but there is no tachypnea, tachycardia, or fever  Suspect pulmonary embolism or contributory to meeting SIRS criteria earlier in admission  Cellulitis greatly improving  COVID-19 apart from emboli may or may not be more less clinically silent

## 2022-01-23 NOTE — PLAN OF CARE
Problem: Potential for Falls  Goal: Patient will remain free of falls  Description: INTERVENTIONS:  - Educate patient/family on patient safety including physical limitations  - Instruct patient to call for assistance with activity   - Consult OT/PT to assist with strengthening/mobility   - Keep Call bell within reach  - Keep bed low and locked with side rails adjusted as appropriate  - Keep care items and personal belongings within reach  - Initiate and maintain comfort rounds  - Make Fall Risk Sign visible to staff  - Offer Toileting every 2 Hours, in advance of need  - Initiate/Maintain Bed/chair alarm  - Obtain necessary fall risk management equipment:   - Apply yellow socks and bracelet for high fall risk patients  - Consider moving patient to room near nurses station  Outcome: Progressing     Problem: MOBILITY - ADULT  Goal: Maintain or return to baseline ADL function  Description: INTERVENTIONS:  -  Assess patient's ability to carry out ADLs; assess patient's baseline for ADL function and identify physical deficits which impact ability to perform ADLs (bathing, care of mouth/teeth, toileting, grooming, dressing, etc )  - Assess/evaluate cause of self-care deficits   - Assess range of motion  - Assess patient's mobility; develop plan if impaired  - Assess patient's need for assistive devices and provide as appropriate  - Encourage maximum independence but intervene and supervise when necessary  - Involve family in performance of ADLs  - Assess for home care needs following discharge   - Consider OT consult to assist with ADL evaluation and planning for discharge  - Provide patient education as appropriate  Outcome: Progressing  Goal: Maintains/Returns to pre admission functional level  Description: INTERVENTIONS:  - Perform BMAT or MOVE assessment daily    - Set and communicate daily mobility goal to care team and patient/family/caregiver     - Collaborate with rehabilitation services on mobility goals if consulted  - Perform Range of Motion 3 times a day  - Reposition patient every 2 hours    - Dangle patient 3 times a day  - Stand patient 3 times a day  - Ambulate patient 3 times a day  - Out of bed to chair 3 times a day   - Out of bed for meals 3 times a day  - Out of bed for toileting  - Record patient progress and toleration of activity level   Outcome: Progressing     Problem: RESPIRATORY - ADULT  Goal: Achieves optimal ventilation and oxygenation  Description: INTERVENTIONS:  - Assess for changes in respiratory status  - Assess for changes in mentation and behavior  - Position to facilitate oxygenation and minimize respiratory effort  - Oxygen administered by appropriate delivery if ordered  - Initiate smoking cessation education as indicated  - Encourage broncho-pulmonary hygiene including cough, deep breathe, Incentive Spirometry  - Assess the need for suctioning and aspirate as needed  - Assess and instruct to report SOB or any respiratory difficulty  - Respiratory Therapy support as indicated  Outcome: Progressing     Problem: INFECTION - ADULT  Goal: Absence or prevention of progression during hospitalization  Description: INTERVENTIONS:  - Assess and monitor for signs and symptoms of infection  - Monitor lab/diagnostic results  - Monitor all insertion sites, i e  indwelling lines, tubes, and drains  - Monitor endotracheal if appropriate and nasal secretions for changes in amount and color  - [place] appropriate cooling/warming therapies per order  - Administer medications as ordered  - Instruct and encourage patient and family to use good hand hygiene technique  - Identify and instruct in appropriate isolation precautions for identified infection/condition  Outcome: Progressing

## 2022-01-23 NOTE — PROGRESS NOTES
[facility] Practice Progress Note - Yoselin De Los Santos 68 y o  female MRN: [id number]    Unit/Bed#: [address] [place] [address] Encounter: [id number]      Assessment/Plan:  * Sepsis with organ dysfunction [facility])  Assessment & Plan  Patient is sirs positive with tachycardia, tachypnea, leukocytosis, elevated PT/INR, elevated ALT, ANNA in the setting of right lower extremity cellulitis  Lactate WNL  S/p vancomycin x1, NS 1 L IV x1  · Continue with vancomycin, renally dosed  · Monitor CBC/vitals  · Fluids as noted   · Follow-up blood cultures  · Follow-up UA with reflex to culture  · covid test  · Sputum cx    Currently meeting SEPSIS criteria with mild fever and borderline tachycardia and improved but persistent leukocytosis  hemodynamically stable    Cellulitis of right lower extremity  Assessment & Plan  Patient presented with worsening right and left thigh warmth and redness  Found to be febrile and septic in the ER    She received does of vancomycin in the ED              · Continue with vancomycin  · Area of erythema improved no longer filling outlined area from day of admisson  · Other management under sepsis    Pneumonia due to COVID-19 virus  Assessment & Plan    COVID-19 Pneumonia  -Has Mild Severity Range infection /  Currently on Baseline home 3 L NC   Positive COVID 19 Date   Results from last 7 days   Lab Units 01/22/22  2252   SARS-COV-2  Positive*         Results from last 7 days   Lab Units 01/23/22  0537   D-DIMER QUANTITATIVE ug/ml FEU 6 13*   CRP mg/L 315 1*     -Steroid:  Dexamethasone Started 1/23  -Remdesivir:  Started 1/23  -Abx #:  Vancomycin for cellulitis  -VTE PPX:  Heparin (Low ACS)  -Baricitnib:  Not indicated    No Acute Hypoxemic Respiratory Failure  -Currently on 3 L NC   -titrate up / down for 02 sat 90  -continue continuous oximetry monitoring via [facility]  -continue proning therapy as indicated    Abnormal Chest Imaging:  -mild diffuse B/L GGO c/w clinical COVID 19  -CTA pending for elevated d-dimer and respiratory distress  -further follow up imaging as needed for changes in oxygen / clinical requirements      Pt reports mild symptoms  Dyspnea on exertion but at rest has no increased oxygen demand  Stage 3 chronic kidney disease [facility])  Assessment & Plan  Lab Results   Component Value Date    EGFR 34 01/22/2022    EGFR 61 10/25/2019    EGFR 61 12/28/2018    CREATININE 1 48 (H) 01/22/2022    CREATININE 0 93 10/25/2019    CREATININE 0 93 12/28/2018     Presented with Acute on Chronic CKD, which resolved  Baseline creatinine around 1, creatinine was on admission 1 48, in the setting of sepsis  · Limit nephrotoxic agents, Renally dose all meds, Monitor BMP, Avoid hypotension  · Give 1L before and after CTA to avoid repeat ANNA    Chronic respiratory failure with hypoxia [facility])  Assessment & Plan  Patient is on 3 L of oxygen at baseline in the setting of her emphysema  · Continue her home oxygen    Mixed hyperlipidemia  Assessment & Plan  Last lipid panel in 2020 LDL 66, triglycerides 138, HDL 47  · Continue home Lipitor      CAD (coronary artery disease)  Assessment & Plan  Prior chest pain and noncritical CAD with normal Lexiscan nuclear stress study on 12/28/2018  No chest pain on admission, stable  - continue home aspirin 81 mg tablet daily    Essential hypertension  Assessment & Plan  Blood pressure stable  · Continue home lisinopril, metoprolol    Centrilobular emphysema ([address] )  Assessment & Plan  Patient has stage 3 severe emphysema  Last PFTs were done in June 2019  Forced vital capacity was 1 60 L or 67% of predicted, FEV1 is 0 7 L or 39% obstruction ratio 44%  · DuoNeb Q4 prn  · Continue home trelegy 1 puff daily  · Monitor respiratory status          Subjective:   Patient initially reports that her breathing is baseline but then recanted and said that when she ambulates she does get short of breath very easily    She continues to report tender erythematous regions on her lower extremities  Area of cellulitis seems to be retreated from the border drawn on the previous day  Discussed with patient plan to give antibiotics for cellulitis and steroids with antiviral for COVID  Objective:     Vitals: Blood pressure 104/55, pulse 79, temperature (!) 96 5 °F (35 8 °C), resp  rate 19, height 5' 1 5" (1 562 m), weight 78 1 kg (172 lb 2 9 oz), SpO2 93 %, not currently breastfeeding  ,Body mass index is 32 01 kg/m²  Wt Readings from Last 3 Encounters:   01/23/22 78 1 kg (172 lb 2 9 oz)   03/09/20 85 3 kg (188 lb)   11/04/19 84 8 kg (187 lb)       Intake/Output Summary (Last 24 hours) at 1/23/2022 1716  Last data filed at 1/22/2022 2240  Gross per 24 hour   Intake 1250 ml   Output --   Net 1250 ml       Physical Exam:  Physical Exam  Constitutional:       General: She is not in acute distress  Appearance: Normal appearance  She is not ill-appearing or toxic-appearing  HENT:      Head: Normocephalic and atraumatic  Nose: Nose normal    Eyes:      Extraocular Movements: Extraocular movements intact  Pupils: Pupils are equal, round, and reactive to light  Cardiovascular:      Rate and Rhythm: Normal rate and regular rhythm  Pulses: Normal pulses  Heart sounds: Normal heart sounds  Pulmonary:      Effort: Pulmonary effort is normal       Breath sounds: Rhonchi present  Abdominal:      Palpations: Abdomen is soft  Tenderness: There is no abdominal tenderness  Musculoskeletal:         General: Normal range of motion  Skin:     General: Skin is warm  Neurological:      Mental Status: She is alert and oriented to person, place, and time     Psychiatric:         Mood and Affect: Mood normal          Behavior: Behavior normal            Recent Results (from the past 24 hour(s))   CBC and differential    Collection Time: 01/22/22  8:05 PM   Result Value Ref Range    WBC 27 70 (H) 4 31 - 10 16 Thousand/uL    RBC 3 95 3 81 - 5 12 Million/uL    Hemoglobin 11 9 11 5 - 15 4 g/dL    Hematocrit 39 8 34 8 - 46 1 %     (H) 82 - 98 fL    MCH 30 1 26 8 - 34 3 pg    MCHC 29 9 (L) 31 4 - 37 4 g/dL    RDW 13 6 11 6 - 15 1 %    MPV 12 3 8 9 - 12 7 fL    Platelets 992 339 - 083 Thousands/uL    nRBC 0 /100 WBCs    Neutrophils Relative 90 (H) 43 - 75 %    Immat GRANS % 1 0 - 2 %    Lymphocytes Relative 3 (L) 14 - 44 %    Monocytes Relative 6 4 - 12 %    Eosinophils Relative 0 0 - 6 %    Basophils Relative 0 0 - 1 %    Neutrophils Absolute 24 86 (H) 1 85 - 7 62 Thousands/µL    Immature Grans Absolute 0 20 0 00 - 0 20 Thousand/uL    Lymphocytes Absolute 0 90 0 60 - 4 47 Thousands/µL    Monocytes Absolute 1 65 (H) 0 17 - 1 22 Thousand/µL    Eosinophils Absolute 0 00 0 00 - 0 61 Thousand/µL    Basophils Absolute 0 09 0 00 - 0 10 Thousands/µL   Protime-INR    Collection Time: 01/22/22  8:05 PM   Result Value Ref Range    Protime 15 5 (H) 11 6 - 14 5 seconds    INR 1 26 (H) 0 84 - 1 19   APTT    Collection Time: 01/22/22  8:05 PM   Result Value Ref Range    PTT 36 23 - 37 seconds   Comprehensive metabolic panel    Collection Time: 01/22/22  8:05 PM   Result Value Ref Range    Sodium 136 136 - 145 mmol/L    Potassium 3 4 (L) 3 5 - 5 3 mmol/L    Chloride 95 (L) 100 - 108 mmol/L    CO2 33 (H) 21 - 32 mmol/L    ANION GAP 8 4 - 13 mmol/L    BUN 18 5 - 25 mg/dL    Creatinine 1 48 (H) 0 60 - 1 30 mg/dL    Glucose 132 65 - 140 mg/dL    Calcium 9 0 8 3 - 10 1 mg/dL    Corrected Calcium 10 2 (H) 8 3 - 10 1 mg/dL    AST 70 (H) 5 - 45 U/L    ALT 63 12 - 78 U/L    Alkaline Phosphatase 103 46 - 116 U/L    Total Protein 8 0 6 4 - 8 2 g/dL    Albumin 2 5 (L) 3 5 - 5 0 g/dL    Total Bilirubin 1 02 (H) 0 20 - 1 00 mg/dL    eGFR 34 ml/min/1 73sq m   Blood culture #1    Collection Time: 01/22/22  8:05 PM    Specimen: Arm, Right; Blood   Result Value Ref Range    Blood Culture Received in Microbiology Lab  Culture in Progress      Blood culture #2    Collection Time: 01/22/22  8:05 PM    Specimen: Arm, Right; Blood   Result Value Ref Range    Blood Culture Received in Microbiology Lab  Culture in Progress      Lactic acid    Collection Time: 01/22/22  8:05 PM   Result Value Ref Range    LACTIC ACID 1 8 0 5 - 2 0 mmol/L   COVID/FLU/RSV    Collection Time: 01/22/22 10:52 PM    Specimen: Nose; Nares   Result Value Ref Range    SARS-CoV-2 Positive (A) Negative    INFLUENZA A PCR Negative Negative    INFLUENZA B PCR Negative Negative    RSV PCR Negative Negative   UA (URINE) with reflex to Scope    Collection Time: 01/23/22 12:18 AM   Result Value Ref Range    Color, UA Yellow     Clarity, UA Clear     Specific [place], UA 1 015 1 000 - 1 030    pH, UA 6 0 5 0, 5 5, 6 0, 6 5, 7 0, 7 5, 8 0, 8 5, 9 0    Leukocytes, UA Trace (A) Negative    Nitrite, UA Negative Negative    Protein, UA Trace (A) Negative mg/dl    Glucose, UA Negative Negative mg/dl    Ketones, UA Negative Negative mg/dl    Urobilinogen, UA 2 0 (A) 0 2, 1 0 E U /dl E U /dl    Bilirubin, UA Negative Negative    Blood, UA Moderate (A) Negative   Urine Microscopic    Collection Time: 01/23/22 12:18 AM   Result Value Ref Range    RBC, UA 2-4 None Seen, 0-1, 1-2, 2-4, 0-5 /hpf    WBC, UA 4-10 (A) None Seen, 0-1, 1-2, 0-5, 2-4 /hpf    Epithelial Cells Occasional None Seen, Occasional /hpf    Bacteria, UA Innumerable (A) None Seen, Occasional /hpf    Hyaline Casts, UA 1-2 (A) (none) /lpf    MUCUS THREADS Occasional (A) None Seen   Comprehensive metabolic panel    Collection Time: 01/23/22  5:37 AM   Result Value Ref Range    Sodium 139 136 - 145 mmol/L    Potassium 4 1 3 5 - 5 3 mmol/L    Chloride 101 100 - 108 mmol/L    CO2 28 21 - 32 mmol/L    ANION GAP 10 4 - 13 mmol/L    BUN 15 5 - 25 mg/dL    Creatinine 1 09 0 60 - 1 30 mg/dL    Glucose 122 65 - 140 mg/dL    Calcium 8 2 (L) 8 3 - 10 1 mg/dL    Corrected Calcium 9 6 8 3 - 10 1 mg/dL     (H) 5 - 45 U/L    ALT 76 12 - 78 U/L    Alkaline Phosphatase 107 46 - 116 U/L    Total Protein 7 2 6 4 - 8 2 g/dL Albumin 2 2 (L) 3 5 - 5 0 g/dL    Total Bilirubin 0 85 0 20 - 1 00 mg/dL    eGFR 49 ml/min/1 73sq m   Magnesium    Collection Time: 01/23/22  5:37 AM   Result Value Ref Range    Magnesium 2 0 1 6 - 2 6 mg/dL   Phosphorus    Collection Time: 01/23/22  5:37 AM   Result Value Ref Range    Phosphorus 3 5 2 3 - 4 1 mg/dL   CBC and differential    Collection Time: 01/23/22  5:37 AM   Result Value Ref Range    WBC 26 50 (H) 4 31 - 10 16 Thousand/uL    RBC 3 67 (L) 3 81 - 5 12 Million/uL    Hemoglobin 11 5 11 5 - 15 4 g/dL    Hematocrit 37 6 34 8 - 46 1 %     (H) 82 - 98 fL    MCH 31 3 26 8 - 34 3 pg    MCHC 30 6 (L) 31 4 - 37 4 g/dL    RDW 13 8 11 6 - 15 1 %    MPV 13 0 (H) 8 9 - 12 7 fL    Platelets 386 358 - 502 Thousands/uL    nRBC 0 /100 WBCs    Neutrophils Relative 88 (H) 43 - 75 %    Immat GRANS % 1 0 - 2 %    Lymphocytes Relative 4 (L) 14 - 44 %    Monocytes Relative 7 4 - 12 %    Eosinophils Relative 0 0 - 6 %    Basophils Relative 0 0 - 1 %    Neutrophils Absolute 23 41 (H) 1 85 - 7 62 Thousands/µL    Immature Grans Absolute 0 16 0 00 - 0 20 Thousand/uL    Lymphocytes Absolute 1 06 0 60 - 4 47 Thousands/µL    Monocytes Absolute 1 78 (H) 0 17 - 1 22 Thousand/µL    Eosinophils Absolute 0 01 0 00 - 0 61 Thousand/µL    Basophils Absolute 0 08 0 00 - 0 10 Thousands/µL   Protime-INR    Collection Time: 01/23/22  5:37 AM   Result Value Ref Range    Protime 15 5 (H) 11 6 - 14 5 seconds    INR 1 26 (H) 0 84 - 1 19   D-dimer, quantitative    Collection Time: 01/23/22  5:37 AM   Result Value Ref Range    D-Dimer, Quant 6 13 (H) <0 50 ug/ml FEU   C-reactive protein    Collection Time: 01/23/22  5:37 AM   Result Value Ref Range     1 (H) <3 0 mg/L       Current Facility-Administered Medications   Medication Dose Route Frequency Provider Last Rate Last Admin    acetaminophen (TYLENOL) tablet 650 mg  650 mg Oral Q6H JOHNN Cynthia Lazcano DO   650 mg at 01/23/22 1009    aspirin (ECOTRIN LOW STRENGTH) EC tablet 81 mg  81 mg Oral Daily Pattricia Pu, DO   81 mg at 01/23/22 1009    atorvastatin (LIPITOR) tablet 40 mg  40 mg Oral Daily Pattricia Pu, DO   40 mg at 01/23/22 1009    calcium carbonate-vitamin D (OSCAL-D) 500 mg-200 units per tablet 1 tablet  1 tablet Oral Daily With Breakfast Pattricia Pu, DO   1 tablet at 01/23/22 1009    dexamethasone (DECADRON) injection 6 mg  6 mg Intravenous Q24H Pattricia Pu, DO   6 mg at 01/23/22 9103    docusate sodium (COLACE) capsule 100 mg  100 mg Oral BID Pattricia Pu, DO   100 mg at 01/23/22 1009    fluticasone-vilanterol (BREO ELLIPTA) 100-25 mcg/inh inhaler 1 puff  1 puff Inhalation Daily Pattricia Pu, DO   1 puff at 01/23/22 1010    heparin (ACS LOW)   Intravenous Once [employer], DO        ipratropium-albuterol (DUO-NEB) 0 5-2 5 mg/3 mL inhalation solution 3 mL  3 mL Nebulization Q4H PRN Kulwant Jenkins MD        lisinopril (ZESTRIL) tablet 5 mg  5 mg Oral Daily Pattricia Pu, DO   5 mg at 01/23/22 1009    metoprolol tartrate (LOPRESSOR) partial tablet 12 5 mg  12 5 mg Oral BID Pattricia Pu, DO   12 5 mg at 01/23/22 1009    ondansetron (ZOFRAN) injection 4 mg  4 mg Intravenous Q6H PRN Pattricia Pu, DO        potassium chloride (K-DUR,KLOR-CON) CR tablet 40 mEq  40 mEq Oral Once Pattricia Pu, DO        [START ON 1/24/2022] remdesivir [place]) 100 mg in sodium chloride 0 9 % 270 mL IVPB  100 mg Intravenous Q24H Pattricia Pu, DO        sodium chloride 0 9 % bolus 1,000 mL  1,000 mL Intravenous Once [place], DO        sodium chloride 0 9 % bolus 1,000 mL  1,000 mL Intravenous Once [employer],  mL/hr at 01/23/22 1541 1,000 mL at 01/23/22 1541    vancomycin (VANCOCIN) IVPB (premix in dextrose) 1,000 mg 200 mL  15 mg/kg (Adjusted) Intravenous Q24H Kulwant Jenkins MD           Invasive Devices  Report    Peripheral Intravenous Line            Peripheral IV 01/22/22 Right Antecubital <1 day                Lab, Imaging and other studies: I have personally reviewed pertinent reports      VTE Pharmacologic Prophylaxis: Enoxaparin (Lovenox)  VTE Mechanical Prophylaxis: sequential compression device    DO [place]

## 2022-01-24 PROBLEM — J96.21 ACUTE ON CHRONIC RESPIRATORY FAILURE WITH HYPOXIA (HCC): Status: ACTIVE | Noted: 2018-06-11

## 2022-01-24 PROBLEM — I26.99 ACUTE PULMONARY EMBOLISM WITHOUT ACUTE COR PULMONALE (HCC): Status: ACTIVE | Noted: 2022-01-24

## 2022-01-24 LAB
ALBUMIN SERPL BCP-MCNC: 2.2 G/DL (ref 3.5–5)
ALP SERPL-CCNC: 126 U/L (ref 46–116)
ALT SERPL W P-5'-P-CCNC: 144 U/L (ref 12–78)
ANION GAP SERPL CALCULATED.3IONS-SCNC: 8 MMOL/L (ref 4–13)
APTT PPP: 53 SECONDS (ref 23–37)
APTT PPP: 64 SECONDS (ref 23–37)
APTT PPP: >210 SECONDS (ref 23–37)
AST SERPL W P-5'-P-CCNC: 161 U/L (ref 5–45)
ATRIAL RATE: 99 BPM
BASOPHILS # BLD MANUAL: 0 THOUSAND/UL (ref 0–0.1)
BASOPHILS NFR MAR MANUAL: 0 % (ref 0–1)
BILIRUB SERPL-MCNC: 0.28 MG/DL (ref 0.2–1)
BUN SERPL-MCNC: 13 MG/DL (ref 5–25)
CALCIUM ALBUM COR SERPL-MCNC: 9.9 MG/DL (ref 8.3–10.1)
CALCIUM SERPL-MCNC: 8.5 MG/DL (ref 8.3–10.1)
CHLORIDE SERPL-SCNC: 103 MMOL/L (ref 100–108)
CO2 SERPL-SCNC: 31 MMOL/L (ref 21–32)
CREAT SERPL-MCNC: 1 MG/DL (ref 0.6–1.3)
CRP SERPL QL: 203 MG/L
EOSINOPHIL # BLD MANUAL: 0 THOUSAND/UL (ref 0–0.4)
EOSINOPHIL NFR BLD MANUAL: 0 % (ref 0–6)
ERYTHROCYTE [DISTWIDTH] IN BLOOD BY AUTOMATED COUNT: 14.1 % (ref 11.6–15.1)
GFR SERPL CREATININE-BSD FRML MDRD: 54 ML/MIN/1.73SQ M
GLUCOSE SERPL-MCNC: 244 MG/DL (ref 65–140)
HCT VFR BLD AUTO: 39.4 % (ref 34.8–46.1)
HGB BLD-MCNC: 11.3 G/DL (ref 11.5–15.4)
INR PPP: 1.4 (ref 0.84–1.19)
LG PLATELETS BLD QL SMEAR: PRESENT
LYMPHOCYTES # BLD AUTO: 0.39 THOUSAND/UL (ref 0.6–4.47)
LYMPHOCYTES # BLD AUTO: 2 % (ref 14–44)
MCH RBC QN AUTO: 30.4 PG (ref 26.8–34.3)
MCHC RBC AUTO-ENTMCNC: 28.7 G/DL (ref 31.4–37.4)
MCV RBC AUTO: 106 FL (ref 82–98)
MONOCYTES # BLD AUTO: 0.39 THOUSAND/UL (ref 0–1.22)
MONOCYTES NFR BLD: 2 % (ref 4–12)
NEUTROPHILS # BLD MANUAL: 18.83 THOUSAND/UL (ref 1.85–7.62)
NEUTS SEG NFR BLD AUTO: 96 % (ref 43–75)
P AXIS: 75 DEGREES
PLATELET # BLD AUTO: 289 THOUSANDS/UL (ref 149–390)
PLATELET BLD QL SMEAR: ADEQUATE
PMV BLD AUTO: 13.4 FL (ref 8.9–12.7)
POTASSIUM SERPL-SCNC: 3.7 MMOL/L (ref 3.5–5.3)
PR INTERVAL: 160 MS
PROCALCITONIN SERPL-MCNC: 0.76 NG/ML
PROT SERPL-MCNC: 7.7 G/DL (ref 6.4–8.2)
PROTHROMBIN TIME: 16.8 SECONDS (ref 11.6–14.5)
QRS AXIS: 36 DEGREES
QRSD INTERVAL: 76 MS
QT INTERVAL: 330 MS
QTC INTERVAL: 423 MS
RBC # BLD AUTO: 3.72 MILLION/UL (ref 3.81–5.12)
RBC MORPH BLD: NORMAL
SODIUM SERPL-SCNC: 142 MMOL/L (ref 136–145)
T WAVE AXIS: 63 DEGREES
VENTRICULAR RATE: 99 BPM
WBC # BLD AUTO: 19.61 THOUSAND/UL (ref 4.31–10.16)

## 2022-01-24 PROCEDURE — 86140 C-REACTIVE PROTEIN: CPT | Performed by: STUDENT IN AN ORGANIZED HEALTH CARE EDUCATION/TRAINING PROGRAM

## 2022-01-24 PROCEDURE — 80053 COMPREHEN METABOLIC PANEL: CPT

## 2022-01-24 PROCEDURE — 99221 1ST HOSP IP/OBS SF/LOW 40: CPT | Performed by: INTERNAL MEDICINE

## 2022-01-24 PROCEDURE — 93010 ELECTROCARDIOGRAM REPORT: CPT | Performed by: INTERNAL MEDICINE

## 2022-01-24 PROCEDURE — 99233 SBSQ HOSP IP/OBS HIGH 50: CPT | Performed by: STUDENT IN AN ORGANIZED HEALTH CARE EDUCATION/TRAINING PROGRAM

## 2022-01-24 PROCEDURE — 85007 BL SMEAR W/DIFF WBC COUNT: CPT

## 2022-01-24 PROCEDURE — 85027 COMPLETE CBC AUTOMATED: CPT

## 2022-01-24 PROCEDURE — 85730 THROMBOPLASTIN TIME PARTIAL: CPT | Performed by: STUDENT IN AN ORGANIZED HEALTH CARE EDUCATION/TRAINING PROGRAM

## 2022-01-24 PROCEDURE — 85730 THROMBOPLASTIN TIME PARTIAL: CPT | Performed by: FAMILY MEDICINE

## 2022-01-24 PROCEDURE — 84145 PROCALCITONIN (PCT): CPT | Performed by: STUDENT IN AN ORGANIZED HEALTH CARE EDUCATION/TRAINING PROGRAM

## 2022-01-24 PROCEDURE — 85610 PROTHROMBIN TIME: CPT | Performed by: STUDENT IN AN ORGANIZED HEALTH CARE EDUCATION/TRAINING PROGRAM

## 2022-01-24 RX ORDER — CEPHALEXIN 500 MG/1
500 CAPSULE ORAL EVERY 6 HOURS SCHEDULED
Status: DISCONTINUED | OUTPATIENT
Start: 2022-01-24 | End: 2022-01-26

## 2022-01-24 RX ADMIN — CEPHALEXIN 500 MG: 500 CAPSULE ORAL at 11:10

## 2022-01-24 RX ADMIN — FLUTICASONE FUROATE AND VILANTEROL TRIFENATATE 1 PUFF: 100; 25 POWDER RESPIRATORY (INHALATION) at 09:08

## 2022-01-24 RX ADMIN — Medication 12.5 MG: at 17:09

## 2022-01-24 RX ADMIN — Medication 12.5 MG: at 09:08

## 2022-01-24 RX ADMIN — REMDESIVIR 100 MG: 100 INJECTION, POWDER, LYOPHILIZED, FOR SOLUTION INTRAVENOUS at 06:32

## 2022-01-24 RX ADMIN — Medication 1 TABLET: at 09:03

## 2022-01-24 RX ADMIN — ATORVASTATIN CALCIUM 40 MG: 40 TABLET, FILM COATED ORAL at 09:03

## 2022-01-24 RX ADMIN — DEXAMETHASONE SODIUM PHOSPHATE 6 MG: 4 INJECTION, SOLUTION INTRA-ARTICULAR; INTRALESIONAL; INTRAMUSCULAR; INTRAVENOUS; SOFT TISSUE at 05:43

## 2022-01-24 RX ADMIN — CEPHALEXIN 500 MG: 500 CAPSULE ORAL at 23:06

## 2022-01-24 RX ADMIN — LISINOPRIL 5 MG: 5 TABLET ORAL at 09:08

## 2022-01-24 RX ADMIN — ASPIRIN 81 MG: 81 TABLET, COATED ORAL at 09:03

## 2022-01-24 RX ADMIN — CEPHALEXIN 500 MG: 500 CAPSULE ORAL at 17:09

## 2022-01-24 RX ADMIN — HEPARIN SODIUM 17 UNITS/KG/HR: 10000 INJECTION, SOLUTION INTRAVENOUS at 17:28

## 2022-01-24 RX ADMIN — HEPARIN SODIUM 6400 UNITS: 1000 INJECTION INTRAVENOUS; SUBCUTANEOUS at 00:09

## 2022-01-24 RX ADMIN — DOCUSATE SODIUM 100 MG: 100 CAPSULE ORAL at 09:03

## 2022-01-24 RX ADMIN — HEPARIN SODIUM 3200 UNITS: 1000 INJECTION INTRAVENOUS; SUBCUTANEOUS at 11:59

## 2022-01-24 NOTE — PROGRESS NOTES
Vancomycin IV Pharmacy-to-Dose Consultation    Juan Pablo Almanza is a 68 y o  female who is currently receiving Vancomycin IV with management by the Pharmacy Consult service  Assessment/Plan:  The patient was reviewed  Renal function is stable and no signs or symptoms of nephrotoxicity and/or infusion reactions were documented in the chart  Based on todays assessment, continue current vancomycin (day # 3) dosing of 1000 mg IV q24h, with a plan for trough to be drawn at 2030 on 01/25/2022  We will continue to follow the patients culture results and clinical progress daily      Madison Le, Pharmacist

## 2022-01-24 NOTE — CONSULTS
Consultation - Pulmonary Medicine  Prashanth Matt 68 y o  female MRN: [id number]  Unit/Bed#: [address]344 [facility] 404-01 Encounter: [id number]  Code Status: Level 1 - Full Code    Assessment/Plan:    1  Acute on Chronic hypoxic respiratory failure:  Improving  Suspect secondary to Problems 2 and 3  She was noted to be saturating at 88% on presentation  On 3 L at baseline which subsequently escalated to 6 L NC O2  She is now on 4 L NC O2 with sats are 92%   Continue supplemental oxygen  Wean for SpO2 of 90%  Continue other supportive therapy    2  Acute pulmonary emboli: CTA PE study revealed filling defects within segmental and subsegmental pulmonary branches supplying the bilateral lower lobes consistent with acute PE  Suspect in the setting COVID-19 pneumonia vs immobility  Denies history of previous DVTs or any family history  Not on estrogen replacement therapy or recent surgery  She does state that she is mostly bedbound  Denies LE swelling and pain  Considered need for LE duplex, however given the fact that a positive finding would not current management, it is reasonable to hold off for now  Started on heparin infusion for anticoagulation  Recommend continuation of same  Will need AC for about 3 -6 months    3  COVID-19 pneumonia :  Tested positive on 01/22  D-dimer and CRP highly elevated  Continue COVID-19 directed therapy  At this time, patient meets criteria for moderate covid-19 disease  She has received initial dose of remdesivir 200 mg IV    - Decadron 6 mg b i d   - Follow up blood cultures  - Continue heparin gtt for anticoagulation  Feel free to switched to therapeutic Lovenox to minimize contact exposure  - On Keflex for cellulitis - anticipate coverage for superimposed bacterial pneumonia    - continue remdesivir 100 mg IV daily  - consider initiating baricitinib 4 mg daily if she worsens  - self proning as tolerated, out of bed, ambulation mobilization and  PT/OT  - Trend inflammatory markers as needed    4  Small right pleural effusion: A seen in CT PE study  Doubt clinical benefit of thoracentesis at this time  Recommend continued monitoring    5  Moderate to severe COPD:  Spirometry in 2019 showed severe obstruction  On long-term oxygen therapy at home  Use trilogy Ellipta at home  Continue Breo Ellipta  Continue DuoNebs Q 4      6  Cellulitis of the right thigh: Presented meeting sepsis criteria  Initially received vancomycin however now transition to Keflex  Appears to be improving on my exam    Continue Keflex  8  Transaminitis:  AST/ALT - 161/ 144  Alk Phos - 126  Not on baricitinib  Monitor closely  9  ANNA  Creatinine of 1 48 POA  Now resolved    Thank you for this consultation; we will be happy to follow with you     ______________________________________________________________________      History of Present Illness   Physician Requesting Consult: Patricia Angeles MD  Reason for Consult :  COVID 19 pneumonia and acute pulmonary emboli      HPI:  Arianna Posey is a 68 y o  female  has a past medical history of Breast lump, CHF (congestive heart failure) ([address] ), COPD (chronic obstructive pulmonary disease) ([address] ), Diverticulitis, Emphysema of lung ([address] ), History of chronic obstructive lung disease, Non-productive cough, and SOBOE (shortness of breath on exertion)  who presents with  a chief complaint of  right thigh pain and swelling for the last 3 weeks  She was hypoxic at 88% on presentation  Respiratory viral panel was positive for COVID-19  Admitted with a diagnosis of acute hypoxic respiratory failure in the setting of COVID-19 pneumonia, Rt thigh cellulitis and acute pulmonary embolism  Initially on 3 L and subsequently escalated to 6 L NC O2  CXR with bilateral GGO consistent with COVID-19 pneumonia  Respiratory viral panel positive for COVID-19  D-dimer was elevated at 6   CTA PE study revealed filling defects within segmental and subsegmental pulmonary branches supplying the bilateral lower lobes consistent with acute pulmonary emboli  The patient is on home medications of Trelegy Ellipta, and  albuterol  Pertinent hospital notes reviewed  Pertinent labs reviewed  Pertinent imaging reviewed  Collaborative discussion with primary team regarding assessment and plan of care  Prior Spirometry / PFT Date on 2019 FEV1/FVC  44% FEV1 39 % FVC  67%  2D echo reviewed  with LVEF of 55% EF  Peak PA pressure of 23 mmHg     Review of Systems   Respiratory: Negative for cough and wheezing  Cardiovascular: Negative for chest pain  Gastrointestinal: Negative  Musculoskeletal: Positive for back pain  Skin: Positive for color change  Neurological: Negative  Past Medical/Surgical History  Past Medical History:   Diagnosis Date    Breast lump     [facility]     COPD [facility]ch[facility] obstructive [facility] disease) [facility]     Diverticulitis     Emphy[facility]ma [facility] lung ([facility])     History of chronic obstructive lung disease     Non-productive cough     SOBOE (shortness of breath on exertion)      History reviewed  No pertinent surgical history      Social History  Social History     Substance and Sexual Activity   Alcohol Use No     Social History     Substance and Sexual Activity   Drug Use No     Social History     Tobacco Use   Smoking Status Former Smoker    Packs/day: 1 50    Years: 55 00    Pack years: 82 50    Quit date: 2010    Years since quittin 6   Smokeless Tobacco Never Used   Tobacco Comment    ex cigarette smoker       Family History  Family History   Problem Relation Age of Onset    Stroke Mother     Diabetes Father     Heart disease Father     Emphysema Family     Stroke Family         syndrome    Diabetes Sister        Allergies  No Known Allergies    Home Meds:   Medications Prior to Admission   Medication Sig Dispense Refill Last Dose    albuterol (2 5 mg/3 mL) 0 083 % nebulizer solution USE 1 VIAL IN NEBULIZER 3 TIMES DAILY 90 vial 11 Past Week at Unknown time    aspirin (ASPIR-LOW) 81 mg EC tablet Take 1 tablet by mouth daily   1/22/2022 at 0800    Calcium 600 MG tablet Take by mouth   1/22/2022 at 08:00    DAILY MULTIPLE VITAMINS PO Take by mouth   1/22/2022 at 0800    albuterol (Ventolin HFA) 90 mcg/act inhaler Inhale 2 puffs 4 (four) times a day 1 Inhaler 3 More than a month at Unknown time    atorvastatin (LIPITOR) 40 mg tablet Take 1 tablet by mouth once daily 90 tablet 0 More than a month at Unknown time    calcium carbonate-vitamin D (OSCAL-D) 500 mg-200 units per tablet Take 1 tablet by mouth 2 (two) times a day with meals (Patient not taking: Reported on 1/22/2022 )   Not Taking at Unknown time    lisinopril (ZESTRIL) 5 mg tablet Take 1 tablet (5 mg total) by mouth daily 30 tablet 0 More than a month at Unknown time    metoprolol tartrate (LOPRESSOR) 25 mg tablet Take 1/2 (one-half) tablet by mouth twice daily 60 tablet 0 More than a month at Unknown time    Trelegy Ellipta 100-62 5-25 MCG/INH inhaler INHALE 1 PUFF ONCE DAILY RINSE MOUTH AFTER USE 60 each 5 More than a month at Unknown time       Current Meds:   Scheduled Meds:  Current Facility-Administered Medications   Medication Dose Route Frequency Provider Last Rate    aspirin  81 mg Oral Daily Neli Violet Hill, DO      atorvastatin  40 mg Oral Daily Neli Violet Hill, DO      calcium carbonate-vitamin D  1 tablet Oral Daily With Breakfast Neli Violet Hill, DO      dexamethasone  6 mg Intravenous Q24H Neli Violet Hill, DO      docusate sodium  100 mg Oral BID Neli Violet Hill, DO      fluticasone-vilanterol  1 puff Inhalation Daily Neli Violet Hill, DO      heparin (porcine)  3-30 Units/kg/hr (Order-Specific) Intravenous Titrated Neli Violet Hill, DO 15 Units/kg/hr (01/24/22 0245)    heparin (porcine)  3,200 Units Intravenous Q1H PRN Neli Violet Hill, DO      heparin (porcine)  6,400 Units Intravenous Q1H PRN Harriett Curl, DO      ipratropium-albuterol  3 mL Nebulization Q4H PRN Janae King MD      lisinopril  5 mg Oral Daily Harriett Curl, DO      metoprolol tartrate  12 5 mg Oral BID Harriett Curl, DO      ondansetron  4 mg Intravenous Q6H PRN Harriett Curl, DO      potassium chloride  40 mEq Oral Once Harriett Curl, DO      remdesivir  100 mg Intravenous Q24H Harriett Curl, DO      vancomycin  15 mg/kg (Adjusted) Intravenous Q24H Janae King MD 1,000 mg (22 5455)     PRN Meds:heparin (porcine), 3,200 Units, Q1H PRN  heparin (porcine), 6,400 Units, Q1H PRN  ipratropium-albuterol, 3 mL, Q4H PRN  ondansetron, 4 mg, Q6H PRN      IV Infusions:   heparin (porcine), 3-30 Units/kg/hr (Order-Specific), Last Rate: 15 Units/kg/hr (22 0245)        ____________________________________________________________________    Objective   Vitals:   Vitals:    22 1755 22 1958 22 2311 22 0905   BP: 107/55 109/55 105/59 127/61   BP Location:   Left arm    Pulse: 85 77 74 84   Resp:  18 18    Temp:  (!) 97 4 °F (36 3 °C) (!) 97 °F (36 1 °C) 98 6 °F (37 °C)   TempSrc:   Oral    SpO2: 93% 94% 93% 90%   Weight:       Height:         Weight (last 2 days)     Date/Time Weight    22 01:21:55 78 1 (172 18)    22 1930 76 3 (168 21)        Temp (24hrs), Av 4 °F (36 3 °C), Min:96 5 °F (35 8 °C), Max:98 6 °F (37 °C)  Current: Temperature: 98 6 °F (37 °C)        Invasive/non-invasive ventilation settings   Respiratory  Report   Lab Data (Last 4 hours)    None         O2/Vent Data (Last 4 hours)    None                Nutrition:        Diet Orders   (From admission, onward)             Start     Ordered    22 1014  Room Service  Once        Question:  Type of Service  Answer:  Room Service - Appropriate with Assistance    22 1013    22 0241  Diet Regular; Regular House  Diet effective now        References:    Nutrtion Support Algorithm Enteral vs  Parenteral   Question Answer Comment   Diet Type Regular    Regular Regular House    RD to adjust diet per protocol? Yes        01/23/22 0240                Ins/Outs:   I/O       01/22 0701 01/23 0700 01/23 0701 01/24 0700 01/24 0701 01/25 0700    IV Piggyback 1250      Total Intake(mL/kg) 1250 (16)      Urine (mL/kg/hr)  925 (0 5)     Total Output  925     Net +1250 -925                  Lines/Drains:  Invasive Devices  Report    Peripheral Intravenous Line            Peripheral IV 01/22/22 Right Antecubital 1 day    Peripheral IV 01/23/22 Dorsal (posterior); Left Hand <1 day              ___________________________________________________________________    Physical Exam  Vitals reviewed  Constitutional:       General: She is not in acute distress  Appearance: She is not toxic-appearing  Cardiovascular:      Rate and Rhythm: Normal rate and regular rhythm  Pulmonary:      Effort: No respiratory distress  Breath sounds: Normal breath sounds  No wheezing  Abdominal:      General: Bowel sounds are normal  There is no distension  Palpations: Abdomen is soft  Tenderness: There is no abdominal tenderness  Musculoskeletal:      Right lower leg: No edema  Left lower leg: No edema  Skin:     Coloration: Skin is not jaundiced or pale  Neurological:      Mental Status: She is alert and oriented to person, place, and time   Mental status is at baseline        ___________________________________________________________________    Laboratory and Diagnostics:  Results from last 7 days   Lab Units 01/23/22  0537 01/22/22 2005   WBC Thousand/uL 26 50* 27 70*   HEMOGLOBIN g/dL 11 5 11 9   HEMATOCRIT % 37 6 39 8   PLATELETS Thousands/uL 294 318   NEUTROS PCT % 88* 90*   MONOS PCT % 7 6     Results from last 7 days   Lab Units 01/23/22 0537 01/22/22 2005   SODIUM mmol/L 139 136   POTASSIUM mmol/L 4 1 3 4*   CHLORIDE mmol/L 101 95*   CO2 mmol/L 28 33*   ANION GAP mmol/L 10 8   BUN mg/dL 15 18   CREATININE mg/dL 1 09 1 48*   CALCIUM mg/dL 8 2* 9 0   GLUCOSE RANDOM mg/dL 122 132   ALT U/L 76 63   AST U/L 121* 70*   ALK PHOS U/L 107 103   ALBUMIN g/dL 2 2* 2 5*   TOTAL BILIRUBIN mg/dL 0 85 1 02*     Results from last 7 days   Lab Units 01/23/22  0537   MAGNESIUM mg/dL 2 0   PHOSPHORUS mg/dL 3 5      Results from last 7 days   Lab Units 01/24/22  0026 01/23/22  0537 01/22/22 2005   INR  1 40* 1 26* 1 26*   PTT seconds >210*  --  36          Results from last 7 days   Lab Units 01/22/22 2005   LACTIC ACID mmol/L 1 8     ABG:    VBG:          Micro  Results from last 7 days   Lab Units 01/22/22 2005   BLOOD CULTURE  No Growth at 24 hrs  No Growth at 24 hrs  Imaging:   CTA chest pe study   Final Result by Amanuel Otoole MD (01/23 4410)      Filling defects within segmental and subsegmental pulmonary branches supplying the bilateral lower lobes consistent acute pulmonary embolus  Small right pleural effusion      Findings discussed with Dr Fiordaliza Gaines at 1130 pm, 1/23/21      Workstation performed: [id number]         XR chest portable   Final Result by Jonas Torres MD (01/23 1126)      Mild bilateral ground glass opacity due to Covid-19  Workstation performed: [id number]               Micro:   Lab Results   Component Value Date    BLOODCX No Growth at 24 hrs  01/22/2022    BLOODCX No Growth at 24 hrs  01/22/2022    BLOODCX No Growth After 5 Days  12/27/2018    BLOODCX No Growth After 5 Days   12/27/2018    URINECX 50,000-59,000 cfu/ml  12/27/2018    MRSACULTURE  12/27/2018     No Methicillin Resistant Staphlyococcus aureus (MRSA) isolated        ____________________________________________________________________

## 2022-01-24 NOTE — CONSULTS
The patient's vancomycin therapy has been discontinued  Pharmacy will sign off now, thank you for this consult       Lusi WadeD

## 2022-01-24 NOTE — QUICK NOTE
Received tiger text for reading room  Discussed patient's CTA with radiologist - patient has multiple segmental and subsegmental pulmonary artery emboli without right heart strain  Her vital signs are stable  - Will increase heparin to VTE/PE infusion protocol  Orders were confirmed with the nurse    - Consult pulmonology

## 2022-01-24 NOTE — PROGRESS NOTES
[address] [facility] Practice Progress Note - Lubna Nicolas 68 y o  female MRN: [id number]    Unit/Bed#: [address] 375-01 Encounter: [id number]      Assessment/Plan:  * Sepsis with organ dysfunction [facility])  Assessment & Plan  Patient is sirs positive with tachycardia, tachypnea, leukocytosis, elevated PT/INR, elevated ALT, ANNA in the setting of right lower extremity cellulitis  Lactate WNL  S/p vancomycin x1, NS 1 L IV x1  · Continue with vancomycin, renally dosed  · Monitor CBC/vitals  · Fluids as noted   · Follow-up blood cultures  · Follow-up UA with reflex to culture  · covid test  · Sputum cx    24 hours without meeting SIRS criteria as leukocytosis remains but there is no tachypnea, tachycardia, or fever  Suspect pulmonary embolism or contributory to meeting SIRS criteria earlier in admission  Cellulitis greatly improving  COVID-19 apart from emboli may or may not be more less clinically silent  Acute pulmonary embolism without acute cor pulmonale (HCC)  Assessment & Plan  Increasing dyspnea on exertion, increasing oxygen demand 3 L to 6 L  CTA found multiple segmental and subsegmental pulmonary emboli without right heart strain    Suspected due to COVID-19 infection    -transitioned from low ACS to PE heparin dosing  -pulmonary consulted; agree that lower extremity ultrasound would not  will hold off for now   -not cleared for Xarelto or Eliquis spine turns  Will need heparin bridge to warfarin      Pneumonia due to COVID-19 virus  Assessment & Plan    COVID-19 Pneumonia  -Has Mild Severity Range infection /  Currently on 6 L NC (requires home O2 of 3; delta 3L)  Positive COVID 19 Date   Results from last 7 days   Lab Units 01/22/22  2252   SARS-COV-2  Positive*         Results from last 7 days   Lab Units 01/23/22  0537   D-DIMER QUANTITATIVE ug/ml FEU 6 13*   CRP mg/L 315 1*     -Steroid:  Dexamethasone Started 1/23  -Remdesivir:  Started 1/23  -Abx #:  Vancomycin for cellulitis  -VTE PPX:  Heparin (Low ACS)  -Baricitnib:  Not indicated    No Acute Hypoxemic Respiratory Failure  -Currently on 3 L NC   -titrate up / down for 02 sat 90  -continue continuous oximetry monitoring via [employer] system  -continue proning therapy as indicated    Abnormal Chest Imaging:  -mild diffuse B/L GGO c/w clinical COVID 19  -CTA pending for elevated d-dimer and respiratory distress  -further follow up imaging as needed for changes in oxygen / clinical requirements      Pt reports mild symptoms  Dyspnea on exertion but at rest has no increased oxygen demand  Cellulitis of right lower extremity  Assessment & Plan  Patient presented with worsening right and left thigh warmth and redness  Found to be febrile and septic in the ER  She received does of vancomycin in the ED              · Continue with vancomycin  · Great improvement of cellulitic area as erythema appears to be resolving and there is less tender  Stage 3 chronic kidney disease [facility])  Assessment & Plan  Lab Results   Component Value Date    EGFR 34 01/22/2022    EGFR 61 10/25/2019    EGFR 61 12/28/2018    CREATININE 1 48 (H) 01/22/2022    CREATININE 0 93 10/25/2019    CREATININE 0 93 12/28/2018     Presented with Acute on Chronic CKD, which resolved   Baseline creatinine around 1, creatinine was on admission 1 48, in the setting of sepsis  · Limit nephrotoxic agents, Renally dose all meds, Monitor BMP, Avoid hypotension  · Give 1L before and after CTA to avoid repeat ANNA    Acute on chronic respiratory failure with hypoxia [facility])  Assessment & Plan  Patient is on 3 L of oxygen at baseline in the setting of her emphysema  - Currently on  6 L NC (requires home O2 of 3; delta 3L)    Mixed hyperlipidemia  Assessment & Plan  Last lipid panel in 2020 LDL 66, triglycerides 138, HDL 47  · Continue home Lipitor      CAD (coronary artery disease)  Assessment & Plan  Prior chest pain and noncritical CAD with normal Lexiscan nuclear stress study on 12/28/2018  No chest pain on admission, stable  - continue home aspirin 81 mg tablet daily    Essential hypertension  Assessment & Plan  Blood pressure stable  · Continue home lisinopril, metoprolol    Centrilobular emphysema ([address] )  Assessment & Plan  Patient has stage 3 severe emphysema  Last PFTs were done in June 2019  Forced vital capacity was 1 60 L or 67% of predicted, FEV1 is 0 7 L or 39% obstruction ratio 44%  · DuoNeb Q4 prn  · Continue home trelegy 1 puff daily  · Monitor respiratory status          Subjective:   Patient reports great improvement of cellulitis  She is able to tolerate touch to the cellulitic area without demonstrating pain  Patient calls her daughter Geni Jack so that both of them may be updated the same time  Patient and daughter are informed that she has been placed on heparin infusion to treat pulmonary emboli found in the lungs  Explained to patient that we will need to transition her to an oral agent prior to discharge  They were also told that although blood clots often travel from the legs to the lungs, it would be of limited additional value to do ultrasound of lower extremities as it would not change treatment  Daughter inquires if vitamin-D would be helpful as other family members have inquired  They were told that as many people in the [facility] are deficient in vitamin-D supplementation would not likely be harmful  They verbalized understanding that vitamin-D would not do anything to help with COVID, pulmonary emboli, or cellulitis  Objective:     Vitals: Blood pressure 126/58, pulse 81, temperature 97 8 °F (36 6 °C), resp  rate 18, height 5' 1 5" (1 562 m), weight 78 1 kg (172 lb 2 9 oz), SpO2 96 %, not currently breastfeeding  ,Body mass index is 32 01 kg/m²    Wt Readings from Last 3 Encounters:   01/23/22 78 1 kg (172 lb 2 9 oz)   03/09/20 85 3 kg (188 lb)   11/04/19 84 8 kg (187 lb)       Intake/Output Summary (Last 24 hours) at 1/24/2022 1640  Last data filed at 1/24/2022 4213  Gross per 24 hour   Intake --   Output 575 ml   Net -575 ml       Physical Exam:  Physical Exam  Constitutional:       General: She is not in acute distress  Appearance: She is obese  She is not ill-appearing or toxic-appearing  HENT:      Head: Normocephalic  Mouth/Throat:      Mouth: Mucous membranes are moist    Eyes:      Pupils: Pupils are equal, round, and reactive to light  Cardiovascular:      Rate and Rhythm: Normal rate and regular rhythm  Pulses: Normal pulses  Heart sounds: Normal heart sounds  Pulmonary:      Breath sounds: Rhonchi present  Abdominal:      Palpations: Abdomen is soft  Tenderness: There is no abdominal tenderness  Musculoskeletal:         General: Normal range of motion  Skin:     General: Skin is warm  Neurological:      Mental Status: She is alert and oriented to person, place, and time     Psychiatric:         Mood and Affect: Mood normal            Recent Results (from the past 24 hour(s))   APTT    Collection Time: 01/24/22 12:26 AM   Result Value Ref Range    PTT >210 (HH) 23 - 37 seconds   Protime-INR    Collection Time: 01/24/22 12:26 AM   Result Value Ref Range    Protime 16 8 (H) 11 6 - 14 5 seconds    INR 1 40 (H) 0 84 - 1 19   CBC and differential    Collection Time: 01/24/22 10:11 AM   Result Value Ref Range    WBC 19 61 (H) 4 31 - 10 16 Thousand/uL    RBC 3 72 (L) 3 81 - 5 12 Million/uL    Hemoglobin 11 3 (L) 11 5 - 15 4 g/dL    Hematocrit 39 4 34 8 - 46 1 %     (H) 82 - 98 fL    MCH 30 4 26 8 - 34 3 pg    MCHC 28 7 (L) 31 4 - 37 4 g/dL    RDW 14 1 11 6 - 15 1 %    MPV 13 4 (H) 8 9 - 12 7 fL    Platelets 327 123 - 434 Thousands/uL   Comprehensive metabolic panel    Collection Time: 01/24/22 10:11 AM   Result Value Ref Range    Sodium 142 136 - 145 mmol/L    Potassium 3 7 3 5 - 5 3 mmol/L    Chloride 103 100 - 108 mmol/L    CO2 31 21 - 32 mmol/L    ANION GAP 8 4 - 13 mmol/L    BUN 13 5 - 25 mg/dL Creatinine 1 00 0 60 - 1 30 mg/dL    Glucose 244 (H) 65 - 140 mg/dL    Calcium 8 5 8 3 - 10 1 mg/dL    Corrected Calcium 9 9 8 3 - 10 1 mg/dL     (H) 5 - 45 U/L     (H) 12 - 78 U/L    Alkaline Phosphatase 126 (H) 46 - 116 U/L    Total Protein 7 7 6 4 - 8 2 g/dL    Albumin 2 2 (L) 3 5 - 5 0 g/dL    Total Bilirubin 0 28 0 20 - 1 00 mg/dL    eGFR 54 ml/min/1 73sq m   C-reactive protein    Collection Time: 01/24/22 10:11 AM   Result Value Ref Range     0 (H) <3 0 mg/L   APTT    Collection Time: 01/24/22 10:11 AM   Result Value Ref Range    PTT 53 (H) 23 - 37 seconds   Manual Differential(PHLEBS Do Not Order)    Collection Time: 01/24/22 10:11 AM   Result Value Ref Range    Segmented % 96 (H) 43 - 75 %    Lymphocytes % 2 (L) 14 - 44 %    Monocytes % 2 (L) 4 - 12 %    Eosinophils, % 0 0 - 6 %    Basophils % 0 0 - 1 %    Absolute Neutrophils 18 83 (H) 1 85 - 7 62 Thousand/uL    Lymphocytes Absolute 0 39 (L) 0 60 - 4 47 Thousand/uL    Monocytes Absolute 0 39 0 00 - 1 22 Thousand/uL    Eosinophils Absolute 0 00 0 00 - 0 40 Thousand/uL    Basophils Absolute 0 00 0 00 - 0 10 Thousand/uL    Total Counted      RBC Morphology Normal     Platelet Estimate Adequate Adequate    Large Platelet Present        Current Facility-Administered Medications   Medication Dose Route Frequency Provider Last Rate Last Admin    aspirin (ECOTRIN LOW STRENGTH) EC tablet 81 mg  81 mg Oral Daily Tarri Ferraro, DO   81 mg at 01/24/22 0801    atorvastatin (LIPITOR) tablet 40 mg  40 mg Oral Daily Tarri Ferraro, DO   40 mg at 01/24/22 6608    calcium carbonate-vitamin D (OSCAL-D) 500 mg-200 units per tablet 1 tablet  1 tablet Oral Daily With Breakfast Tarri Ferraro, DO   1 tablet at 01/24/22 2731    cephalexin (KEFLEX) capsule 500 mg  500 mg Oral Q6H [address] [facility], DO   500 mg at 01/24/22 1110    dexamethasone (DECADRON) injection 6 mg  6 mg Intravenous Q24H Tarri Ferraro, DO   6 mg at 01/24/22 0543    docusate sodium (COLACE) capsule 100 mg  100 mg Oral BID Dawn Harden, DO   100 mg at 01/24/22 9294    fluticasone-vilanterol (BREO ELLIPTA) 100-25 mcg/inh inhaler 1 puff  1 puff Inhalation Daily Dawn Harden, DO   1 puff at 01/24/22 0908    heparin (porcine) 25,000 units in 0 45% NaCl 250 mL infusion (premix)  3-30 Units/kg/hr (Order-Specific) Intravenous Titrated Dawn Harden, DO 13 6 mL/hr at 01/24/22 1158 17 Units/kg/hr at 01/24/22 1158    heparin (porcine) injection 3,200 Units  3,200 Units Intravenous Q1H PRN Dawn Harden, DO   3,200 Units at 01/24/22 1159    heparin (porcine) injection 6,400 Units  6,400 Units Intravenous Q1H PRN Dawn Harden, DO        ipratropium-albuterol (DUO-NEB) 0 5-2 5 mg/3 mL inhalation solution 3 mL  3 mL Nebulization Q4H PRN Burnett MD Elisa        lisinopril (ZESTRIL) tablet 5 mg  5 mg Oral Daily Dawn Harden, DO   5 mg at 01/24/22 0314    metoprolol tartrate (LOPRESSOR) partial tablet 12 5 mg  12 5 mg Oral BID Dawn Harden, DO   12 5 mg at 01/24/22 0908    ondansetron (ZOFRAN) injection 4 mg  4 mg Intravenous Q6H PRN Dawn Harden, DO        potassium chloride (K-DUR,KLOR-CON) CR tablet 40 mEq  40 mEq Oral Once Dawn Harden, DO        remdesivir Radene Stack) 100 mg in sodium chloride 0 9 % 270 mL IVPB  100 mg Intravenous Q24H Dawn Harden, DO   100 mg at 01/24/22 0460       Invasive Devices  Report    Peripheral Intravenous Line            Peripheral IV 01/22/22 Right Antecubital 1 day    Peripheral IV 01/23/22 Dorsal (posterior); Left Hand <1 day                Lab, Imaging and other studies: I have personally reviewed pertinent reports      VTE Pharmacologic Prophylaxis: Heparin  VTE Mechanical Prophylaxis: sequential compression device    [employer], DO

## 2022-01-24 NOTE — PLAN OF CARE
Problem: Potential for Falls  Goal: Patient will remain free of falls  Description: INTERVENTIONS:  - Educate patient/family on patient safety including physical limitations  - Instruct patient to call for assistance with activity   - Consult OT/PT to assist with strengthening/mobility   - Keep Call bell within reach  - Keep bed low and locked with side rails adjusted as appropriate  - Keep care items and personal belongings within reach  - Initiate and maintain comfort rounds  - Make Fall Risk Sign visible to staff  - Offer Toileting every 2 Hours, in advance of need  - Initiate/Maintain bed/chair alarm  - Obtain necessary fall risk management equipment:   - Apply yellow socks and bracelet for high fall risk patients  - Consider moving patient to room near nurses station  Outcome: Progressing     Problem: MOBILITY - ADULT  Goal: Maintain or return to baseline ADL function  Description: INTERVENTIONS:  -  Assess patient's ability to carry out ADLs; assess patient's baseline for ADL function and identify physical deficits which impact ability to perform ADLs (bathing, care of mouth/teeth, toileting, grooming, dressing, etc )  - Assess/evaluate cause of self-care deficits   - Assess range of motion  - Assess patient's mobility; develop plan if impaired  - Assess patient's need for assistive devices and provide as appropriate  - Encourage maximum independence but intervene and supervise when necessary  - Involve family in performance of ADLs  - Assess for home care needs following discharge   - Consider OT consult to assist with ADL evaluation and planning for discharge  - Provide patient education as appropriate  Outcome: Progressing  Goal: Maintains/Returns to pre admission functional level  Description: INTERVENTIONS:  - Perform BMAT or MOVE assessment daily    - Set and communicate daily mobility goal to care team and patient/family/caregiver     - Collaborate with rehabilitation services on mobility goals if consulted  - Perform Range of Motion 3 times a day  - Reposition patient every 2 hours    - Dangle patient 3 times a day  - Stand patient 3 times a day  - Ambulate patient 3 times a day  - Out of bed to chair 3 times a day   - Out of bed for meals 3 times a day  - Out of bed for toileting  - Record patient progress and toleration of activity level   Outcome: Progressing     Problem: RESPIRATORY - ADULT  Goal: Achieves optimal ventilation and oxygenation  Description: INTERVENTIONS:  - Assess for changes in respiratory status  - Assess for changes in mentation and behavior  - Position to facilitate oxygenation and minimize respiratory effort  - Oxygen administered by appropriate delivery if ordered  - Initiate smoking cessation education as indicated  - Encourage broncho-pulmonary hygiene including cough, deep breathe, Incentive Spirometry  - Assess the need for suctioning and aspirate as needed  - Assess and instruct to report SOB or any respiratory difficulty  - Respiratory Therapy support as indicated  Outcome: Progressing     Problem: INFECTION - ADULT  Goal: Absence or prevention of progression during hospitalization  Description: INTERVENTIONS:  - Assess and monitor for signs and symptoms of infection  - Monitor lab/diagnostic results  - Monitor all insertion sites, i e  indwelling lines, tubes, and drains  - Monitor endotracheal if appropriate and nasal secretions for changes in amount and color  - [place] appropriate cooling/warming therapies per order  - Administer medications as ordered  - Instruct and encourage patient and family to use good hand hygiene technique  - Identify and instruct in appropriate isolation precautions for identified infection/condition  Outcome: Progressing

## 2022-01-24 NOTE — ASSESSMENT & PLAN NOTE
Increasing dyspnea on exertion, increasing oxygen demand 3 L to 6 L  CTA found multiple segmental and subsegmental pulmonary emboli without right heart strain    Suspected due to COVID-19 infection    -transitioned from low ACS to PE heparin dosing  -pulmonary consulted; agree that lower extremity ultrasound would not  will hold off for now   -patient will require 6 months of anticoagulation   Pt agrees to 90 dollar co-pay for the remaining 5 months if she can get first 1 month through coupon    Transitioned to [facility] with plan for DC tomorrow

## 2022-01-24 NOTE — CONSULTS
Consultation - Pulmonary Medicine  Eva Juan 68 y o  female MRN: [id number]  Unit/Bed#: 43244 [facility] 404-01 Encounter: [id number]  Code Status: Level 1 - Full Code    Assessment/Plan:    1  Acute on Chronic hypoxic respiratory failure:  Improving Suspect secondary to Problems 2 and 3  Noted to be saturating at 88% on presentation  On 3 L at baseline however subsequently escalated to 6 L NC O2  She is now on 4 L NC O2 with sats are 92%   Continue supplemental oxygen  Wean for SpO2 of 90%  Continue other supportive therapy    2  Acute pulmonary emboli: In the setting COVID-19 pneumonia vs immobility  She does state that she is mostly bedbound  Denies LE swelling and pain  Started on heparin infusion for anticoagulation  Recommend continuation of same  3  COVID-19 pneumonia :  Tested positive on 01/22  D-dimer and CRP highly elevated  Continue COVID-19 directed therapy  At this time, patient meets criteria for moderate covid-19 disease  She has received initial dose of remdesivir 200 mg IV    - Decadron 6 mg b i d   - Follow up blood cultures  - Continue heparin gtt for anticoagulation  Feel free to switched to therapeutic Lovenox to minimize contact exposure  - On Keflex for cellulitis - anticipate coverage for superimposed bacterial pneumonia  - continue remdesivir 100 mg IV daily  - consider initiating baricitinib 4 mg daily if she worsens  - self proning as tolerated, out of bed, ambulation mobilization and  PT/OT  - Trend inflammatory markers as needed    4  Small right pleural effusion: A seen in CT PE study  Doubt clinical benefit of thoracentesis at this time  Recommend continued monitoring    5  Moderate to severe COPD:  Spirometry in 2019 showed severe obstruction  On long-term oxygen therapy at home  Use trilogy Ellipta at home  Continue Breo Ellipta  Continue DuoNebs Q 4      6  Cellulitis of the right thigh: Presented meeting sepsis criteria    Initially received vancomycin however now transition to Keflex  Appears to be improving on my exam    Continue Keflex  8  Transaminitis:  AST/ALT - 161/ 144  Alk Phos - 126  Not on baricitinib  Monitor closely  9  ANNA  Creatinine of 1 48 POA  Now resolved    Thank you for this consultation; we will be happy to follow with you     ______________________________________________________________________      History of Present Illness   Physician Requesting Consult: Darby Patrick MD  Reason for Consult :  COVID 19 pneumonia and acute pulmonary emboli      HPI:  Prashanth Matt is a 68 y o  female  has a past medical history of Breast lump, CHF (congestive heart failure) ([facility] [address] ), COPD (chronic obstructive pulmonary disease) ([facility] [address] ), Diverticulitis, Emphysema of lung ([facility] [address] ), History of chronic obstructive lung disease, Non-productive cough, and SOBOE (shortness of breath on exertion)  who presents with  a chief complaint of  right thigh pain and swelling for the last 3 weeks  She was hypoxic at 88% on presentation  Respiratory viral panel was positive for COVID-19  Admitted with a diagnosis of acute hypoxic respiratory failure in the setting of COVID-19 pneumonia, Rt thigh cellulitis and acute pulmonary embolism  Initially on 3 L and subsequently escalated to 6 L NC O2  CXR with bilateral GGO consistent with COVID-19 pneumonia  Respiratory viral panel positive for COVID-19  D-dimer was elevated at 6  CTA PE study revealed filling defects within segmental and subsegmental pulmonary branches supplying the bilateral lower lobes consistent with acute pulmonary emboli  The patient is on home medications of Trelegy Ellipta, and  albuterol  Pertinent hospital notes reviewed  Pertinent labs reviewed  Pertinent imaging reviewed  Collaborative discussion with primary team regarding assessment and plan of care  Prior Spirometry / PFT Date on 06/03/2019 FEV1/FVC  44% FEV1 39 % FVC  67%  2D echo reviewed  with LVEF of 55% EF    Peak PA pressure of 23 mmHg     Review of Systems   Respiratory: Negative for cough and wheezing  Cardiovascular: Negative for chest pain  Gastrointestinal: Negative  Musculoskeletal: Positive for back pain  Skin: Positive for color change  Neurological: Negative  Past Medical/Surgical History  Past Medical History:   Diagnosis Date    Breast lump     CHF (congestive heart failure) (HCC)     COPD (chronic obstructive pulmonary disease) (HCC)     Diverticulitis     Emphysema of lung (HCC)     History of chronic obstructive lung disease     Non-productive cough     SOBOE (shortness of breath on exertion)      History reviewed  No pertinent surgical history      Social History  Social History     Substance and Sexual Activity   Alcohol Use No     Social History     Substance and Sexual Activity   Drug Use No     Social History     Tobacco Use   Smoking Status Former Smoker    Packs/day: 1 50    Years: 55 00    Pack years: 82 50    Quit date: 2010    Years since quittin 6   Smokeless Tobacco Never Used   Tobacco Comment    ex cigarette smoker       Family History  Family History   Problem Relation Age of Onset    Stroke Mother     Diabetes Father     Heart disease Father     Emphysema Family     Stroke Family         syndrome    Diabetes Sister        Allergies  No Known Allergies    Home Meds:   Medications Prior to Admission   Medication Sig Dispense Refill Last Dose    albuterol (2 5 mg/3 mL) 0 083 % nebulizer solution USE 1 VIAL IN NEBULIZER 3 TIMES DAILY 90 vial 11 Past Week at Unknown time    aspirin (ASPIR-LOW) 81 mg EC tablet Take 1 tablet by mouth daily   2022 at 0800    Calcium 600 MG tablet Take by mouth   2022 at 08:00    DAILY MULTIPLE VITAMINS PO Take by mouth   2022 at 0800    albuterol (Ventolin HFA) 90 mcg/act inhaler Inhale 2 puffs 4 (four) times a day 1 Inhaler 3 More than a month at Unknown time    atorvastatin (LIPITOR) 40 mg tablet Take 1 tablet by mouth once daily 90 tablet 0 More than a month at Unknown time    calcium carbonate-vitamin D (OSCAL-D) 500 mg-200 units per tablet Take 1 tablet by mouth 2 (two) times a day with meals (Patient not taking: Reported on 1/22/2022 )   Not Taking at Unknown time    lisinopril (ZESTRIL) 5 mg tablet Take 1 tablet (5 mg total) by mouth daily 30 tablet 0 More than a month at Unknown time    metoprolol tartrate (LOPRESSOR) 25 mg tablet Take 1/2 (one-half) tablet by mouth twice daily 60 tablet 0 More than a month at Unknown time    Trelegy Ellipta 100-62 5-25 MCG/INH inhaler INHALE 1 PUFF ONCE DAILY RINSE MOUTH AFTER USE 60 each 5 More than a month at Unknown time       Current Meds:   Scheduled Meds:  Current Facility-Administered Medications   Medication Dose Route Frequency Provider Last Rate    aspirin  81 mg Oral Daily [facility], DO      atorvastatin  40 mg Oral Daily [facility], DO      calcium carbonate-vitamin D  1 tablet Oral Daily With [facility]      dexamethasone  6 mg Intravenous Q24H [facility], DO      docusate sodium  100 mg Oral BID [facility], DO      fluticasone-vilanterol  1 puff Inhalation Daily [facility], DO      heparin (porcine)  3-30 Units/kg/hr (Order-Specific) Intravenous Titrated [facility], DO 15 Units/kg/hr (01/24/22 0245)    heparin (porcine)  3,200 Units Intravenous Q1H PRN [facility], DO      heparin (porcine)  6,400 Units Intravenous Q1H PRN [facility], DO      ipratropium-albuterol  3 mL Nebulization Q4H PRN Nik Lares MD      lisinopril  5 mg Oral Daily [facility], DO      metoprolol tartrate  12 5 mg Oral BID [facility], DO      ondansetron  4 mg Intravenous Q6H PRN [facility], DO      potassium chloride  40 mEq Oral Once [facility], DO      remdesivir  100 mg Intravenous Q24H [facility], DO      vancomycin  15 mg/kg (Adjusted) Intravenous Q24H Sameera Russell MD 1,000 mg (22 9975)     PRN Meds:heparin (porcine), 3,200 Units, Q1H PRN  heparin (porcine), 6,400 Units, Q1H PRN  ipratropium-albuterol, 3 mL, Q4H PRN  ondansetron, 4 mg, Q6H PRN      IV Infusions:   heparin (porcine), 3-30 Units/kg/hr (Order-Specific), Last Rate: 15 Units/kg/hr (22 0245)        ____________________________________________________________________    Objective   Vitals:   Vitals:    22 1755 22 1958 22 2311 22 0905   BP: 107/55 109/55 105/59 127/61   BP Location:   Left arm    Pulse: 85 77 74 84   Resp:  18 18    Temp:  (!) 97 4 °F (36 3 °C) (!) 97 °F (36 1 °C) 98 6 °F (37 °C)   TempSrc:   Oral    SpO2: 93% 94% 93% 90%   Weight:       Height:         Weight (last 2 days)     Date/Time Weight    22 01:21:55 78 1 (172 18)    22 1930 76 3 (168 21)        Temp (24hrs), Av 4 °F (36 3 °C), Min:96 5 °F (35 8 °C), Max:98 6 °F (37 °C)  Current: Temperature: 98 6 °F (37 °C)        Invasive/non-invasive ventilation settings   Respiratory  Report   Lab Data (Last 4 hours)    None         O2/Vent Data (Last 4 hours)    None                Nutrition:        Diet Orders   (From admission, onward)             Start     Ordered    22 1014  Room Service  Once        Question:  Type of Service  Answer:  Room Service - Appropriate with Assistance    22 1013    22 0241  Diet Regular; Regular House  Diet effective now        References:    Nutrtion Support Algorithm Enteral vs  Parenteral   Question Answer Comment   Diet Type Regular    Regular Regular House    RD to adjust diet per protocol?  Yes        22 0240                Ins/Outs:   I/O        07 07 07    IV Piggyback 1250      Total Intake(mL/kg) 1250 (16)      Urine (mL/kg/hr)  925 (0 5)     Total Output  925     Net +1250 -925                  Lines/Drains:  Invasive Devices  Report Peripheral Intravenous Line            Peripheral IV 01/22/22 Right Antecubital 1 day    Peripheral IV 01/23/22 Dorsal (posterior); Left Hand <1 day              ___________________________________________________________________    Physical Exam  Vitals reviewed  Constitutional:       General: She is not in acute distress  Appearance: She is not toxic-appearing  Cardiovascular:      Rate and Rhythm: Normal rate and regular rhythm  Pulmonary:      Effort: No respiratory distress  Breath sounds: Normal breath sounds  No wheezing  Abdominal:      General: Bowel sounds are normal  There is no distension  Palpations: Abdomen is soft  Tenderness: There is no abdominal tenderness  Musculoskeletal:      Right lower leg: No edema  Left lower leg: No edema  Skin:     Coloration: Skin is not jaundiced or pale  Neurological:      Mental Status: She is alert and oriented to person, place, and time   Mental status is at baseline        ___________________________________________________________________    Laboratory and Diagnostics:  Results from last 7 days   Lab Units 01/23/22  0537 01/22/22 2005   WBC Thousand/uL 26 50* 27 70*   HEMOGLOBIN g/dL 11 5 11 9   HEMATOCRIT % 37 6 39 8   PLATELETS Thousands/uL 294 318   NEUTROS PCT % 88* 90*   MONOS PCT % 7 6     Results from last 7 days   Lab Units 01/23/22  0537 01/22/22 2005   SODIUM mmol/L 139 136   POTASSIUM mmol/L 4 1 3 4*   CHLORIDE mmol/L 101 95*   CO2 mmol/L 28 33*   ANION GAP mmol/L 10 8   BUN mg/dL 15 18   CREATININE mg/dL 1 09 1 48*   CALCIUM mg/dL 8 2* 9 0   GLUCOSE RANDOM mg/dL 122 132   ALT U/L 76 63   AST U/L 121* 70*   ALK PHOS U/L 107 103   ALBUMIN g/dL 2 2* 2 5*   TOTAL BILIRUBIN mg/dL 0 85 1 02*     Results from last 7 days   Lab Units 01/23/22  0537   MAGNESIUM mg/dL 2 0   PHOSPHORUS mg/dL 3 5      Results from last 7 days   Lab Units 01/24/22  0026 01/23/22  0537 01/22/22 2005   INR  1 40* 1 26* 1 26*   PTT seconds >210* --  36          Results from last 7 days   Lab Units 01/22/22 2005   LACTIC ACID mmol/L 1 8     ABG:    VBG:          Micro  Results from last 7 days   Lab Units 01/22/22 2005   BLOOD CULTURE  No Growth at 24 hrs  No Growth at 24 hrs  Imaging:   CTA chest pe study   Final Result by Virgen Tan MD (01/23 8528)      Filling defects within segmental and subsegmental pulmonary branches supplying the bilateral lower lobes consistent acute pulmonary embolus  Small right pleural effusion      Findings discussed with Dr Lucia Dye at 1130 pm, 1/23/21      Workstation performed: [id number]         XR chest portable   Final Result by Gisel Corley MD (01/23 1126)      Mild bilateral ground glass opacity due to Covid-19  Workstation performed: [id number]               Micro:   Lab Results   Component Value Date    BLOODCX No Growth at 24 hrs  01/22/2022    BLOODCX No Growth at 24 hrs  01/22/2022    BLOODCX No Growth After 5 Days  12/27/2018    BLOODCX No Growth After 5 Days   12/27/2018    URINECX 50,000-59,000 cfu/ml  12/27/2018    MRSACULTURE  12/27/2018     No Methicillin Resistant Staphlyococcus aureus (MRSA) isolated        ____________________________________________________________________

## 2022-01-25 LAB
ALBUMIN SERPL BCP-MCNC: 2 G/DL (ref 3.5–5)
ALP SERPL-CCNC: 104 U/L (ref 46–116)
ALT SERPL W P-5'-P-CCNC: 108 U/L (ref 12–78)
ANION GAP SERPL CALCULATED.3IONS-SCNC: 7 MMOL/L (ref 4–13)
APTT PPP: 44 SECONDS (ref 23–37)
APTT PPP: 93 SECONDS (ref 23–37)
AST SERPL W P-5'-P-CCNC: 90 U/L (ref 5–45)
BACTERIA UR CULT: NORMAL
BASOPHILS # BLD AUTO: 0.07 THOUSANDS/ΜL (ref 0–0.1)
BASOPHILS NFR BLD AUTO: 1 % (ref 0–1)
BILIRUB SERPL-MCNC: 0.22 MG/DL (ref 0.2–1)
BUN SERPL-MCNC: 14 MG/DL (ref 5–25)
CALCIUM ALBUM COR SERPL-MCNC: 10 MG/DL (ref 8.3–10.1)
CALCIUM SERPL-MCNC: 8.4 MG/DL (ref 8.3–10.1)
CHLORIDE SERPL-SCNC: 106 MMOL/L (ref 100–108)
CO2 SERPL-SCNC: 31 MMOL/L (ref 21–32)
CREAT SERPL-MCNC: 0.95 MG/DL (ref 0.6–1.3)
EOSINOPHIL # BLD AUTO: 0.06 THOUSAND/ΜL (ref 0–0.61)
EOSINOPHIL NFR BLD AUTO: 0 % (ref 0–6)
ERYTHROCYTE [DISTWIDTH] IN BLOOD BY AUTOMATED COUNT: 14.2 % (ref 11.6–15.1)
GFR SERPL CREATININE-BSD FRML MDRD: 58 ML/MIN/1.73SQ M
GLUCOSE SERPL-MCNC: 88 MG/DL (ref 65–140)
HCT VFR BLD AUTO: 36 % (ref 34.8–46.1)
HGB BLD-MCNC: 10.5 G/DL (ref 11.5–15.4)
IMM GRANULOCYTES # BLD AUTO: 0.09 THOUSAND/UL (ref 0–0.2)
IMM GRANULOCYTES NFR BLD AUTO: 1 % (ref 0–2)
INR PPP: 1.21 (ref 0.84–1.19)
LYMPHOCYTES # BLD AUTO: 2.42 THOUSANDS/ΜL (ref 0.6–4.47)
LYMPHOCYTES NFR BLD AUTO: 17 % (ref 14–44)
MCH RBC QN AUTO: 30.4 PG (ref 26.8–34.3)
MCHC RBC AUTO-ENTMCNC: 29.2 G/DL (ref 31.4–37.4)
MCV RBC AUTO: 104 FL (ref 82–98)
MONOCYTES # BLD AUTO: 1.07 THOUSAND/ΜL (ref 0.17–1.22)
MONOCYTES NFR BLD AUTO: 8 % (ref 4–12)
NEUTROPHILS # BLD AUTO: 10.63 THOUSANDS/ΜL (ref 1.85–7.62)
NEUTS SEG NFR BLD AUTO: 73 % (ref 43–75)
NRBC BLD AUTO-RTO: 0 /100 WBCS
PLATELET # BLD AUTO: 284 THOUSANDS/UL (ref 149–390)
PMV BLD AUTO: 13.1 FL (ref 8.9–12.7)
POTASSIUM SERPL-SCNC: 3.5 MMOL/L (ref 3.5–5.3)
PROCALCITONIN SERPL-MCNC: 0.69 NG/ML
PROT SERPL-MCNC: 6.7 G/DL (ref 6.4–8.2)
PROTHROMBIN TIME: 15.1 SECONDS (ref 11.6–14.5)
RBC # BLD AUTO: 3.45 MILLION/UL (ref 3.81–5.12)
SODIUM SERPL-SCNC: 144 MMOL/L (ref 136–145)
WBC # BLD AUTO: 14.34 THOUSAND/UL (ref 4.31–10.16)

## 2022-01-25 PROCEDURE — 85730 THROMBOPLASTIN TIME PARTIAL: CPT | Performed by: STUDENT IN AN ORGANIZED HEALTH CARE EDUCATION/TRAINING PROGRAM

## 2022-01-25 PROCEDURE — 84145 PROCALCITONIN (PCT): CPT | Performed by: STUDENT IN AN ORGANIZED HEALTH CARE EDUCATION/TRAINING PROGRAM

## 2022-01-25 PROCEDURE — 99232 SBSQ HOSP IP/OBS MODERATE 35: CPT | Performed by: INTERNAL MEDICINE

## 2022-01-25 PROCEDURE — 80053 COMPREHEN METABOLIC PANEL: CPT

## 2022-01-25 PROCEDURE — 85610 PROTHROMBIN TIME: CPT

## 2022-01-25 PROCEDURE — 99232 SBSQ HOSP IP/OBS MODERATE 35: CPT | Performed by: STUDENT IN AN ORGANIZED HEALTH CARE EDUCATION/TRAINING PROGRAM

## 2022-01-25 PROCEDURE — 85025 COMPLETE CBC W/AUTO DIFF WBC: CPT

## 2022-01-25 RX ORDER — HYDRALAZINE HYDROCHLORIDE 20 MG/ML
5 INJECTION INTRAMUSCULAR; INTRAVENOUS EVERY 6 HOURS PRN
Status: DISCONTINUED | OUTPATIENT
Start: 2022-01-25 | End: 2022-01-26 | Stop reason: HOSPADM

## 2022-01-25 RX ADMIN — CEPHALEXIN 500 MG: 500 CAPSULE ORAL at 23:37

## 2022-01-25 RX ADMIN — FLUTICASONE FUROATE AND VILANTEROL TRIFENATATE 1 PUFF: 100; 25 POWDER RESPIRATORY (INHALATION) at 08:28

## 2022-01-25 RX ADMIN — ATORVASTATIN CALCIUM 40 MG: 40 TABLET, FILM COATED ORAL at 08:27

## 2022-01-25 RX ADMIN — DEXAMETHASONE SODIUM PHOSPHATE 6 MG: 4 INJECTION, SOLUTION INTRA-ARTICULAR; INTRALESIONAL; INTRAMUSCULAR; INTRAVENOUS; SOFT TISSUE at 06:19

## 2022-01-25 RX ADMIN — HEPARIN SODIUM 3200 UNITS: 1000 INJECTION INTRAVENOUS; SUBCUTANEOUS at 00:50

## 2022-01-25 RX ADMIN — REMDESIVIR 100 MG: 100 INJECTION, POWDER, LYOPHILIZED, FOR SOLUTION INTRAVENOUS at 07:49

## 2022-01-25 RX ADMIN — Medication 12.5 MG: at 17:30

## 2022-01-25 RX ADMIN — LISINOPRIL 5 MG: 5 TABLET ORAL at 08:27

## 2022-01-25 RX ADMIN — Medication 12.5 MG: at 08:27

## 2022-01-25 RX ADMIN — APIXABAN 10 MG: 5 TABLET, FILM COATED ORAL at 17:25

## 2022-01-25 RX ADMIN — ASPIRIN 81 MG: 81 TABLET, COATED ORAL at 08:27

## 2022-01-25 RX ADMIN — CEPHALEXIN 500 MG: 500 CAPSULE ORAL at 06:19

## 2022-01-25 RX ADMIN — DOCUSATE SODIUM 100 MG: 100 CAPSULE ORAL at 08:27

## 2022-01-25 RX ADMIN — DOCUSATE SODIUM 100 MG: 100 CAPSULE ORAL at 17:25

## 2022-01-25 RX ADMIN — CEPHALEXIN 500 MG: 500 CAPSULE ORAL at 17:25

## 2022-01-25 RX ADMIN — CEPHALEXIN 500 MG: 500 CAPSULE ORAL at 12:36

## 2022-01-25 RX ADMIN — HEPARIN SODIUM 17 UNITS/KG/HR: 10000 INJECTION, SOLUTION INTRAVENOUS at 10:21

## 2022-01-25 RX ADMIN — Medication 1 TABLET: at 08:28

## 2022-01-25 NOTE — PROGRESS NOTES
[address] [facility] 65 [address] 82 [facility] Practice Progress Note - Javier Lloyd 68 y o  female MRN: [id number]    Unit/Bed#: [address] Encounter: [id number]      Assessment/Plan:  * Sepsis with organ dysfunction [facility])  Assessment & Plan  Patient is sirs positive with tachycardia, tachypnea, leukocytosis, elevated PT/INR, elevated ALT, ANNA in the setting of right lower extremity cellulitis  Lactate WNL  S/p vancomycin x1, NS 1 L IV x1  · Continue with vancomycin, renally dosed  · Monitor CBC/vitals  · Fluids as noted   · Follow-up blood cultures  · Follow-up UA with reflex to culture  · covid test  · Sputum cx    24 hours without meeting SIRS criteria as leukocytosis remains but there is no tachypnea, tachycardia, or fever  Suspect pulmonary embolism or contributory to meeting SIRS criteria earlier in admission  Cellulitis greatly improving  COVID-19 apart from emboli may or may not be more less clinically silent  Acute pulmonary embolism without acute cor pulmonale (HCC)  Assessment & Plan  Increasing dyspnea on exertion, increasing oxygen demand 3 L to 6 L  CTA found multiple segmental and subsegmental pulmonary emboli without right heart strain    Suspected due to COVID-19 infection    -transitioned from low ACS to PE heparin dosing  -pulmonary consulted; agree that lower extremity ultrasound would not  will hold off for now   -patient will require 6 months of anticoagulation   Pt agrees to 80 dollar co-pay for the remaining 5 months if she can get first 1 month through coupon    Transitioned to Eliquis with plan for DC tomorrow    Pneumonia due to COVID-19 virus  Assessment & Plan    COVID-19 Pneumonia  -Has Mild Severity Range infection /  Currently on 3 L NC (requires home O2 of 3; delta 3L)  Positive COVID 19 Date   Results from last 7 days   Lab Units 01/22/22  2252   SARS-COV-2  Positive*         Results from last 7 days   Lab Units 01/23/22  0537   D-DIMER QUANTITATIVE ug/ml FEU 6 13*   CRP mg/L 315 1*     -Steroid:  Dexamethasone Started 1/23  -Remdesivir:  Started 1/23  -Abx #:  Vancomycin for cellulitis  -VTE PPX:  Heparin (Low ACS)  -Baricitnib:  Not indicated    No Acute Hypoxemic Respiratory Failure  -Currently on Home 3 L NC   -titrate up / down for 02 sat 90  -continue continuous oximetry monitoring via [employer] system  -continue proning therapy as indicated    Abnormal Chest Imaging:  -mild diffuse B/L GGO c/w clinical COVID 19  -CTA pending for elevated d-dimer and respiratory distress  -further follow up imaging as needed for changes in oxygen / clinical requirements      Pt reports mild symptoms  Dyspnea on exertion but at rest has no increased oxygen demand  Cellulitis of right lower extremity  Assessment & Plan  Patient presented with worsening right and left thigh warmth and redness  Found to be febrile and septic in the ER  She received does of vancomycin in the ED    Day of Admission      Day 3 of Hospital Stay        · Continue with vancomycin  · Great improvement of cellulitic area as erythema appears to be resolving and there is less tender  Stage 3 chronic kidney disease [facility])  Assessment & Plan  Lab Results   Component Value Date    EGFR 34 01/22/2022    EGFR 61 10/25/2019    EGFR 61 12/28/2018    CREATININE 1 48 (H) 01/22/2022    CREATININE 0 93 10/25/2019    CREATININE 0 93 12/28/2018     Presented with Acute on Chronic CKD, which resolved   Baseline creatinine around 1, creatinine was on admission 1 48, in the setting of sepsis  · Limit nephrotoxic agents, Renally dose all meds, Monitor BMP, Avoid hypotension  · Give 1L before and after CTA to avoid repeat ANNA    Acute on chronic respiratory failure with hypoxia [facility])  Assessment & Plan  Patient is on 3 L of oxygen at baseline in the setting of her emphysema  - Currently on  6 L NC (requires home O2 of 3; delta 3L)    Mixed hyperlipidemia  Assessment & Plan  Last lipid panel in 2020 LDL 66, triglycerides 138, HDL 47  · Continue home Lipitor      CAD (coronary artery disease)  Assessment & Plan  Prior chest pain and noncritical CAD with normal Lexiscan nuclear stress study on 12/28/2018  No chest pain on admission, stable  - continue home aspirin 81 mg tablet daily    Essential hypertension  Assessment & Plan  Blood pressure stable  · Continue home lisinopril, metoprolol    Centrilobular emphysema ([address] )  Assessment & Plan  Patient has stage 3 severe emphysema  Last PFTs were done in June 2019  Forced vital capacity was 1 60 L or 67% of predicted, FEV1 is 0 7 L or 39% obstruction ratio 44%  · DuoNeb Q4 prn  · Continue home trelegy 1 puff daily  · Monitor respiratory status      Subjective:   Patient seen evaluated bedside  She reports little bit tenderness still in the lower extremities below greatly improved  Patient would have 90 dollar co-pay per month for Eliquis  Spoke with patient about requiring a 6 month duration of anticoagulation  With the understanding that she gets a 1 month coupon, she agrees to pay the co-pay for the remainder of time required  Objective:     Vitals: Blood pressure 139/64, pulse 78, temperature 98 4 °F (36 9 °C), temperature source Oral, resp  rate 19, height 5' 1 5" (1 562 m), weight 78 1 kg (172 lb 2 9 oz), SpO2 92 %, not currently breastfeeding  ,Body mass index is 32 01 kg/m²  Wt Readings from Last 3 Encounters:   01/23/22 78 1 kg (172 lb 2 9 oz)   03/09/20 85 3 kg (188 lb)   11/04/19 84 8 kg (187 lb)       Intake/Output Summary (Last 24 hours) at 1/25/2022 1511  Last data filed at 1/25/2022 1021  Gross per 24 hour   Intake 1270 ml   Output 1700 ml   Net -430 ml       Physical Exam:  Physical Exam  Constitutional:       General: She is not in acute distress  Appearance: She is obese  She is not ill-appearing or toxic-appearing  HENT:      Head: Normocephalic and atraumatic        Mouth/Throat:      Mouth: Mucous membranes are moist    Eyes:      Extraocular Movements: Extraocular movements intact  Pupils: Pupils are equal, round, and reactive to light  Cardiovascular:      Rate and Rhythm: Normal rate and regular rhythm  Pulses: Normal pulses  Heart sounds: Normal heart sounds  Pulmonary:      Effort: Pulmonary effort is normal       Breath sounds: Normal breath sounds  Abdominal:      Palpations: Abdomen is soft  Tenderness: There is no abdominal tenderness  Musculoskeletal:         General: Normal range of motion  Cervical back: Normal range of motion  Skin:     General: Skin is warm  Findings: Rash (improved) present  Neurological:      Mental Status: She is alert and oriented to person, place, and time     Psychiatric:         Mood and Affect: Mood normal          Behavior: Behavior normal        Recent Results (from the past 24 hour(s))   Procalcitonin with AM Reflex    Collection Time: 01/24/22  5:46 PM   Result Value Ref Range    Procalcitonin 0 76 (H) <=0 25 ng/ml   APTT    Collection Time: 01/24/22  5:46 PM   Result Value Ref Range    PTT 64 (H) 23 - 37 seconds   APTT    Collection Time: 01/25/22 12:04 AM   Result Value Ref Range    PTT 44 (H) 23 - 37 seconds   Procalcitonin Reflex    Collection Time: 01/25/22  6:50 AM   Result Value Ref Range    Procalcitonin 0 69 (H) <=0 25 ng/ml   CBC and differential    Collection Time: 01/25/22  6:50 AM   Result Value Ref Range    WBC 14 34 (H) 4 31 - 10 16 Thousand/uL    RBC 3 45 (L) 3 81 - 5 12 Million/uL    Hemoglobin 10 5 (L) 11 5 - 15 4 g/dL    Hematocrit 36 0 34 8 - 46 1 %     (H) 82 - 98 fL    MCH 30 4 26 8 - 34 3 pg    MCHC 29 2 (L) 31 4 - 37 4 g/dL    RDW 14 2 11 6 - 15 1 %    MPV 13 1 (H) 8 9 - 12 7 fL    Platelets 572 727 - 653 Thousands/uL    nRBC 0 /100 WBCs    Neutrophils Relative 73 43 - 75 %    Immat GRANS % 1 0 - 2 %    Lymphocytes Relative 17 14 - 44 %    Monocytes Relative 8 4 - 12 %    Eosinophils Relative 0 0 - 6 %    Basophils Relative 1 0 - 1 %    Neutrophils Absolute 10 63 (H) 1 85 - 7 62 Thousands/µL    Immature Grans Absolute 0 09 0 00 - 0 20 Thousand/uL    Lymphocytes Absolute 2 42 0 60 - 4 47 Thousands/µL    Monocytes Absolute 1 07 0 17 - 1 22 Thousand/µL    Eosinophils Absolute 0 06 0 00 - 0 61 Thousand/µL    Basophils Absolute 0 07 0 00 - 0 10 Thousands/µL   Comprehensive metabolic panel    Collection Time: 01/25/22  6:50 AM   Result Value Ref Range    Sodium 144 136 - 145 mmol/L    Potassium 3 5 3 5 - 5 3 mmol/L    Chloride 106 100 - 108 mmol/L    CO2 31 21 - 32 mmol/L    ANION GAP 7 4 - 13 mmol/L    BUN 14 5 - 25 mg/dL    Creatinine 0 95 0 60 - 1 30 mg/dL    Glucose 88 65 - 140 mg/dL    Calcium 8 4 8 3 - 10 1 mg/dL    Corrected Calcium 10 0 8 3 - 10 1 mg/dL    AST 90 (H) 5 - 45 U/L     (H) 12 - 78 U/L    Alkaline Phosphatase 104 46 - 116 U/L    Total Protein 6 7 6 4 - 8 2 g/dL    Albumin 2 0 (L) 3 5 - 5 0 g/dL    Total Bilirubin 0 22 0 20 - 1 00 mg/dL    eGFR 58 ml/min/1 73sq m   Protime-INR    Collection Time: 01/25/22  7:02 AM   Result Value Ref Range    Protime 15 1 (H) 11 6 - 14 5 seconds    INR 1 21 (H) 0 84 - 1 19   APTT    Collection Time: 01/25/22  7:02 AM   Result Value Ref Range    PTT 93 (H) 23 - 37 seconds       Current Facility-Administered Medications   Medication Dose Route Frequency Provider Last Rate Last Admin    apixaban (ELIQUIS) tablet 10 mg  10 mg Oral BID [facility], DO        aspirin (ECOTRIN LOW STRENGTH) EC tablet 81 mg  81 mg Oral Daily Paula Form, DO   81 mg at 01/25/22 0827    atorvastatin (LIPITOR) tablet 40 mg  40 mg Oral Daily Paula Form, DO   40 mg at 01/25/22 0827    calcium carbonate-vitamin D (OSCAL-D) 500 mg-200 units per tablet 1 tablet  1 tablet Oral Daily With Breakfast Paula Form, DO   1 tablet at 01/25/22 0828    cephalexin (KEFLEX) capsule 500 mg  500 mg Oral Q6H [address] [place], DO   500 mg at 01/25/22 1236    dexamethasone (DECADRON) injection 6 mg  6 mg Intravenous Q24H Pattricia Pu, DO   6 mg at 01/25/22 5745    docusate sodium (COLACE) capsule 100 mg  100 mg Oral BID Pattricia Pu, DO   100 mg at 01/25/22 0827    fluticasone-vilanterol (BREO ELLIPTA) 100-25 mcg/inh inhaler 1 puff  1 puff Inhalation Daily Pattricia Pu, DO   1 puff at 01/25/22 7964    hydrALAZINE (APRESOLINE) injection 5 mg  5 mg Intravenous Q6H PRN Tayler Mckeon,         ipratropium-albuterol (DUO-NEB) 0 5-2 5 mg/3 mL inhalation solution 3 mL  3 mL Nebulization Q4H PRN Kulwant Jenkins MD        lisinopril (ZESTRIL) tablet 5 mg  5 mg Oral Daily Pattricia Pu, DO   5 mg at 01/25/22 0827    metoprolol tartrate (LOPRESSOR) partial tablet 12 5 mg  12 5 mg Oral BID Pattricia Pu, DO   12 5 mg at 01/25/22 0827    ondansetron (ZOFRAN) injection 4 mg  4 mg Intravenous Q6H PRN Pattricia Pu, DO        potassium chloride (K-DUR,KLOR-CON) CR tablet 40 mEq  40 mEq Oral Once Pattricia Pu, DO        remdesivir [place]) 100 mg in sodium chloride 0 9 % 270 mL IVPB  100 mg Intravenous Q24H Pattricia Pu, DO   100 mg at 01/25/22 0749       Invasive Devices  Report    Peripheral Intravenous Line            Peripheral IV 01/23/22 Dorsal (posterior); Left Hand 1 day    Peripheral IV 01/25/22 Dorsal (posterior); Right Hand <1 day                Lab, Imaging and other studies: I have personally reviewed pertinent reports      VTE Pharmacologic Prophylaxis: Heparin  VTE Mechanical Prophylaxis: sequential compression device    [employer], DO

## 2022-01-25 NOTE — PLAN OF CARE
Problem: Potential for Falls  Goal: Patient will remain free of falls  Description: INTERVENTIONS:  - Educate patient/family on patient safety including physical limitations  - Instruct patient to call for assistance with activity   - Consult OT/PT to assist with strengthening/mobility   - Keep Call bell within reach  - Keep bed low and locked with side rails adjusted as appropriate  - Keep care items and personal belongings within reach  - Initiate and maintain comfort rounds  - Make Fall Risk Sign visible to staff  - Offer Toileting every 2 Hours, in advance of need  - Initiate/Maintain BED alarm  - Obtain necessary fall risk management equipment: NONE  - Apply yellow socks and bracelet for high fall risk patients  - Consider moving patient to room near nurses station  Outcome: Progressing     Problem: MOBILITY - ADULT  Goal: Maintain or return to baseline ADL function  Description: INTERVENTIONS:  -  Assess patient's ability to carry out ADLs; assess patient's baseline for ADL function and identify physical deficits which impact ability to perform ADLs (bathing, care of mouth/teeth, toileting, grooming, dressing, etc )  - Assess/evaluate cause of self-care deficits   - Assess range of motion  - Assess patient's mobility; develop plan if impaired  - Assess patient's need for assistive devices and provide as appropriate  - Encourage maximum independence but intervene and supervise when necessary  - Involve family in performance of ADLs  - Assess for home care needs following discharge   - Consider OT consult to assist with ADL evaluation and planning for discharge  - Provide patient education as appropriate  Outcome: Progressing  Goal: Maintains/Returns to pre admission functional level  Description: INTERVENTIONS:  - Perform BMAT or MOVE assessment daily    - Set and communicate daily mobility goal to care team and patient/family/caregiver     - Collaborate with rehabilitation services on mobility goals if consulted  - Perform Range of Motion 2 times a day  - Reposition patient every 2 hours    - Dangle patient 1 times a day  - Stand patient 1 times a day  - Ambulate patient 2 times a day  - Out of bed to chair 2 times a day   - Out of bed for meals 2 times a day  - Out of bed for toileting  - Record patient progress and toleration of activity level   Outcome: Progressing     Problem: RESPIRATORY - ADULT  Goal: Achieves optimal ventilation and oxygenation  Description: INTERVENTIONS:  - Assess for changes in respiratory status  - Assess for changes in mentation and behavior  - Position to facilitate oxygenation and minimize respiratory effort  - Oxygen administered by appropriate delivery if ordered  - Initiate smoking cessation education as indicated  - Encourage broncho-pulmonary hygiene including cough, deep breathe, Incentive Spirometry  - Assess the need for suctioning and aspirate as needed  - Assess and instruct to report SOB or any respiratory difficulty  - Respiratory Therapy support as indicated  Outcome: Progressing

## 2022-01-25 NOTE — PROGRESS NOTES
Progress Note - Pulmonary   Carlene Jaimes 68 y o  female MRN: [id number]  Unit/Bed#: [address] Encounter: [id number]      Assessment:  1  Acute on Chronic hypoxic respiratory failure  2  COVID-19 pneumonia   3  Acute pulmonary emboli  4  Moderate-Severe COPD  5  Thigh cellulitis  6  Elevated LFTs  7  Acute Kidney Injury    Plan:  · Currently on 3L nasal canula, continue oxygen support, wean for goal saturation >88%  · Continue IV heparin for now, will need total of 6 months of A/c therapy with VTE in the setting of COVID19 Pneumonia  · Continue COVID protocol with dexamethasone 6mg daily, complete 5 days of remdesivir  · Continue Keflex for cellulitis which is improving  · Encourage self proning/OOB to chair/IS    Will continue to follow    Subjective:   "I feel okay"  Currently on 3L nasal canula      Vitals: Blood pressure 139/64, pulse 78, temperature 98 4 °F (36 9 °C), temperature source Oral, resp  rate 19, height 5' 1 5" (1 562 m), weight 78 1 kg (172 lb 2 9 oz), SpO2 92 %, not currently breastfeeding , Body mass index is 32 01 kg/m²  Intake/Output Summary (Last 24 hours) at 1/25/2022 1401  Last data filed at 1/25/2022 1021  Gross per 24 hour   Intake 1270 ml   Output 1700 ml   Net -430 ml       Physical Exam  Gen: Awake, alert, oriented x 3, no acute distress  HEENT: Mucous membranes moist, no oral lesions, no thrush  NECK: No accessory muscle use, JVP not elevated  Cardiac: Regular, single S1, single S2, no murmurs, no rubs, no gallops  Lungs: Decreased breath sounds at the bases  Abdomen: normoactive bowel sounds, soft nontender, nondistended, no rebound or rigidity, no guarding  Extremities: no cyanosis, no clubbing, no edema    Labs: I have personally reviewed pertinent lab results          Karthik Mccartney MD

## 2022-01-25 NOTE — PLAN OF CARE
Problem: RESPIRATORY - ADULT  Goal: Achieves optimal ventilation and oxygenation  Description: INTERVENTIONS:  - Assess for changes in respiratory status  - Assess for changes in mentation and behavior  - Position to facilitate oxygenation and minimize respiratory effort  - Oxygen administered by appropriate delivery if ordered  - Initiate smoking cessation education as indicated  - Encourage broncho-pulmonary hygiene including cough, deep breathe, Incentive Spirometry  - Assess the need for suctioning and aspirate as needed  - Assess and instruct to report SOB or any respiratory difficulty  - Respiratory Therapy support as indicated  Outcome: Progressing     Problem: INFECTION - ADULT  Goal: Absence or prevention of progression during hospitalization  Description: INTERVENTIONS:  - Assess and monitor for signs and symptoms of infection  - Monitor lab/diagnostic results  - Monitor all insertion sites, i e  indwelling lines, tubes, and drains  - Monitor endotracheal if appropriate and nasal secretions for changes in amount and color  - [place] appropriate cooling/warming therapies per order  - Administer medications as ordered  - Instruct and encourage patient and family to use good hand hygiene technique  - Identify and instruct in appropriate isolation precautions for identified infection/condition  Outcome: Progressing

## 2022-01-26 VITALS
DIASTOLIC BLOOD PRESSURE: 64 MMHG | BODY MASS INDEX: 31.68 KG/M2 | SYSTOLIC BLOOD PRESSURE: 134 MMHG | HEIGHT: 62 IN | WEIGHT: 172.18 LBS | TEMPERATURE: 98.5 F | RESPIRATION RATE: 19 BRPM | OXYGEN SATURATION: 97 % | HEART RATE: 70 BPM

## 2022-01-26 PROBLEM — R65.20 SEPSIS WITH ORGAN DYSFUNCTION (HCC): Status: RESOLVED | Noted: 2022-01-22 | Resolved: 2022-01-26

## 2022-01-26 PROBLEM — J12.82 PNEUMONIA DUE TO COVID-19 VIRUS: Status: RESOLVED | Noted: 2022-01-23 | Resolved: 2022-01-26

## 2022-01-26 PROBLEM — J96.21 ACUTE ON CHRONIC RESPIRATORY FAILURE WITH HYPOXIA (HCC): Status: RESOLVED | Noted: 2018-06-11 | Resolved: 2022-01-26

## 2022-01-26 PROBLEM — U07.1 PNEUMONIA DUE TO COVID-19 VIRUS: Status: RESOLVED | Noted: 2022-01-23 | Resolved: 2022-01-26

## 2022-01-26 PROBLEM — L03.115 CELLULITIS OF RIGHT LOWER EXTREMITY: Status: RESOLVED | Noted: 2022-01-22 | Resolved: 2022-01-26

## 2022-01-26 PROBLEM — A41.9 SEPSIS WITH ORGAN DYSFUNCTION (HCC): Status: RESOLVED | Noted: 2022-01-22 | Resolved: 2022-01-26

## 2022-01-26 LAB
ALBUMIN SERPL BCP-MCNC: 1.9 G/DL (ref 3.5–5)
ALP SERPL-CCNC: 89 U/L (ref 46–116)
ALT SERPL W P-5'-P-CCNC: 87 U/L (ref 12–78)
ANION GAP SERPL CALCULATED.3IONS-SCNC: 2 MMOL/L (ref 4–13)
AST SERPL W P-5'-P-CCNC: 64 U/L (ref 5–45)
BASOPHILS # BLD MANUAL: 0 THOUSAND/UL (ref 0–0.1)
BASOPHILS NFR MAR MANUAL: 0 % (ref 0–1)
BILIRUB SERPL-MCNC: 0.33 MG/DL (ref 0.2–1)
BUN SERPL-MCNC: 12 MG/DL (ref 5–25)
CALCIUM ALBUM COR SERPL-MCNC: 10 MG/DL (ref 8.3–10.1)
CALCIUM SERPL-MCNC: 8.3 MG/DL (ref 8.3–10.1)
CHLORIDE SERPL-SCNC: 105 MMOL/L (ref 100–108)
CO2 SERPL-SCNC: 38 MMOL/L (ref 21–32)
CREAT SERPL-MCNC: 0.88 MG/DL (ref 0.6–1.3)
DME PARACHUTE DELIVERY DATE ACTUAL: NORMAL
DME PARACHUTE DELIVERY DATE EXPECTED: NORMAL
DME PARACHUTE DELIVERY DATE REQUESTED: NORMAL
DME PARACHUTE ITEM DESCRIPTION: NORMAL
DME PARACHUTE ORDER STATUS: NORMAL
DME PARACHUTE SUPPLIER NAME: NORMAL
DME PARACHUTE SUPPLIER PHONE: NORMAL
EOSINOPHIL # BLD MANUAL: 0.18 THOUSAND/UL (ref 0–0.4)
EOSINOPHIL NFR BLD MANUAL: 2 % (ref 0–6)
ERYTHROCYTE [DISTWIDTH] IN BLOOD BY AUTOMATED COUNT: 14.2 % (ref 11.6–15.1)
GFR SERPL CREATININE-BSD FRML MDRD: 64 ML/MIN/1.73SQ M
GLUCOSE SERPL-MCNC: 90 MG/DL (ref 65–140)
HCT VFR BLD AUTO: 34.6 % (ref 34.8–46.1)
HGB BLD-MCNC: 10.4 G/DL (ref 11.5–15.4)
LYMPHOCYTES # BLD AUTO: 2.94 THOUSAND/UL (ref 0.6–4.47)
LYMPHOCYTES # BLD AUTO: 32 % (ref 14–44)
MCH RBC QN AUTO: 31 PG (ref 26.8–34.3)
MCHC RBC AUTO-ENTMCNC: 30.1 G/DL (ref 31.4–37.4)
MCV RBC AUTO: 103 FL (ref 82–98)
MONOCYTES # BLD AUTO: 1.19 THOUSAND/UL (ref 0–1.22)
MONOCYTES NFR BLD: 13 % (ref 4–12)
NEUTROPHILS # BLD MANUAL: 4.87 THOUSAND/UL (ref 1.85–7.62)
NEUTS SEG NFR BLD AUTO: 53 % (ref 43–75)
PLATELET # BLD AUTO: 325 THOUSANDS/UL (ref 149–390)
PLATELET BLD QL SMEAR: ADEQUATE
PMV BLD AUTO: 12.3 FL (ref 8.9–12.7)
POTASSIUM SERPL-SCNC: 3.8 MMOL/L (ref 3.5–5.3)
PROT SERPL-MCNC: 6.4 G/DL (ref 6.4–8.2)
RBC # BLD AUTO: 3.36 MILLION/UL (ref 3.81–5.12)
RBC MORPH BLD: NORMAL
SODIUM SERPL-SCNC: 145 MMOL/L (ref 136–145)
WBC # BLD AUTO: 9.19 THOUSAND/UL (ref 4.31–10.16)

## 2022-01-26 PROCEDURE — 94760 N-INVAS EAR/PLS OXIMETRY 1: CPT

## 2022-01-26 PROCEDURE — 85027 COMPLETE CBC AUTOMATED: CPT

## 2022-01-26 PROCEDURE — NS001 PR NO SIGNATURE OR ATTESTATION: Performed by: FAMILY MEDICINE

## 2022-01-26 PROCEDURE — 80053 COMPREHEN METABOLIC PANEL: CPT

## 2022-01-26 PROCEDURE — 94761 N-INVAS EAR/PLS OXIMETRY MLT: CPT

## 2022-01-26 PROCEDURE — 85007 BL SMEAR W/DIFF WBC COUNT: CPT

## 2022-01-26 RX ORDER — CEPHALEXIN 500 MG/1
500 CAPSULE ORAL EVERY 12 HOURS SCHEDULED
Qty: 8 CAPSULE | Refills: 0 | Status: SHIPPED | OUTPATIENT
Start: 2022-01-26 | End: 2022-01-30

## 2022-01-26 RX ORDER — SACCHAROMYCES BOULARDII 250 MG
250 CAPSULE ORAL 2 TIMES DAILY
Qty: 8 CAPSULE | Refills: 0 | Status: SHIPPED | OUTPATIENT
Start: 2022-01-26 | End: 2022-01-30

## 2022-01-26 RX ORDER — SACCHAROMYCES BOULARDII 250 MG
250 CAPSULE ORAL 2 TIMES DAILY
Status: DISCONTINUED | OUTPATIENT
Start: 2022-01-26 | End: 2022-01-26 | Stop reason: HOSPADM

## 2022-01-26 RX ORDER — CEPHALEXIN 500 MG/1
500 CAPSULE ORAL EVERY 12 HOURS SCHEDULED
Status: DISCONTINUED | OUTPATIENT
Start: 2022-01-26 | End: 2022-01-26 | Stop reason: HOSPADM

## 2022-01-26 RX ADMIN — REMDESIVIR 100 MG: 100 INJECTION, POWDER, LYOPHILIZED, FOR SOLUTION INTRAVENOUS at 07:27

## 2022-01-26 RX ADMIN — APIXABAN 10 MG: 5 TABLET, FILM COATED ORAL at 08:54

## 2022-01-26 RX ADMIN — CEPHALEXIN 500 MG: 500 CAPSULE ORAL at 08:53

## 2022-01-26 RX ADMIN — DEXAMETHASONE SODIUM PHOSPHATE 6 MG: 4 INJECTION, SOLUTION INTRA-ARTICULAR; INTRALESIONAL; INTRAMUSCULAR; INTRAVENOUS; SOFT TISSUE at 05:30

## 2022-01-26 RX ADMIN — CEPHALEXIN 500 MG: 500 CAPSULE ORAL at 05:31

## 2022-01-26 RX ADMIN — Medication 250 MG: at 08:52

## 2022-01-26 RX ADMIN — LISINOPRIL 5 MG: 5 TABLET ORAL at 08:53

## 2022-01-26 RX ADMIN — FLUTICASONE FUROATE AND VILANTEROL TRIFENATATE 1 PUFF: 100; 25 POWDER RESPIRATORY (INHALATION) at 08:54

## 2022-01-26 RX ADMIN — DOCUSATE SODIUM 100 MG: 100 CAPSULE ORAL at 08:52

## 2022-01-26 RX ADMIN — Medication 12.5 MG: at 08:54

## 2022-01-26 RX ADMIN — Medication 1 TABLET: at 08:52

## 2022-01-26 RX ADMIN — ATORVASTATIN CALCIUM 40 MG: 40 TABLET, FILM COATED ORAL at 08:52

## 2022-01-26 RX ADMIN — ASPIRIN 81 MG: 81 TABLET, COATED ORAL at 08:52

## 2022-01-26 NOTE — CASE MANAGEMENT
Case Management Discharge Planning Note    Patient name Cher Dallas  Location [address]  Jovany Chrisi 112-* MRN [id number]  : 1945 Date 2022       Current Admission Date: 2022  Current Admission Diagnosis:Centrilobular emphysema [facility])   Patient Active Problem List    Diagnosis Date Noted    Acute pulmonary embolism without acute cor pulmonale ([facility] ) 2022    COVID-19 vaccination declined 2022    Former smoker 12/10/2018    [facility] ) 2018    Essential hypertension 2018    CAD (coronary artery disease) 2018    Mixed hyperlipidemia 2018    Stage 3 chronic kidney disease ([facility] ) 2018      LOS (days): 4  Geometric Mean LOS (GMLOS) (days): 4 80  Days to GMLOS:1     OBJECTIVE:  Risk of Unplanned Readmission Score: 14      Current admission status: Inpatient   Preferred Pharmacy:   [facility] [address] [place]  [address]  [address]  Phone: [phone] Fax: [phone]    Primary Care Provider: Jacquie Garsia DO    Primary Insurance: MEDICARE  Secondary Insurance: AAR    DISCHARGE DETAILS:    Discharge planning discussed with[de-identified] Patient     CM contacted family/caregiver?: Yes    Contacts  Patient Contacts: Anselm Child  Relationship to Patient[de-identified] Family  Contact Method: Phone (Left VM)  Phone Number: [phone]  Reason/Outcome: Discharge Planning    DME Referral Provided  Referral made for DME?: Yes  DME referral completed for the following items[de-identified] Home Oxygen concentrator,Portable Oxygen tanks  DME Supplier Name[de-identified] [employer]    Other Referral/Resources/Interventions Provided:  Interventions: DME    Would you like to participate in our [address] service program?  : No - Declined    Treatment Team Recommendation: Home  Discharge Destination Plan[de-identified] Home  Transport at Discharge : Family    Patient has been medically cleared for discharge home today   RT completed home O2 study and confirmed pt needs 3L at rest and 6L on exertion  Pt confirmed she already has home O2 through [facility]  Dtr will bring in portable on discharge  [place] order submitted to [facility] to update  Nursing notified

## 2022-01-26 NOTE — DISCHARGE INSTR - AVS FIRST PAGE
Please continue to isolate for your COVID-19 infection  In tested positive on 01/22/2022 so please refrain from unnecessary contact or travel outside of the home until February 3, 2022  If you must leave the home please do not do so before 1 01/27/2022 and please wear a high-quality mask  Please reconsider getting vaccinated against COVID-19 within the next month to help prevent reinfection/infection with a different strain of the virus

## 2022-01-26 NOTE — DISCHARGE INSTRUCTIONS
Pulmonary Embolism   WHAT YOU NEED TO KNOW:   What is a pulmonary embolism (PE)?  A PE is the sudden blockage of a blood vessel in the lungs by an embolus  An embolus is a small piece of blood clot, fat, air, or tumor cells  The embolus cuts off the blood supply to your lungs  A PE can become life-threatening  What increases my risk for a PE?   · Obesity    · Smoking cigarettes    · A blood disorder that makes your blood clot faster than normal, such as factor V Leiden mutation    · Birth control pills, especially if you are older than 28 or smoke cigarettes    · Certain blood diseases, such as thrombophilia and hyperhomocysteinemia    · Medical conditions, such as a deep venous thrombosis (DVT) or cancer    · Pregnancy and childbirth    · Recent surgery    · Sitting or lying in one position for a long time, such as when you travel by plane    What are the signs and symptoms of a PE?   · Sudden shortness of breath or fast breathing    · Sudden chest pain that is worse when you take a deep breath    · Fast heartbeat    · Fever and coughing up blood    · Bluish nails    · Cold, pale, clammy skin    · Fainting    How is a PE diagnosed? Ask your healthcare provider about these and other tests you may need:  · Blood tests  may show signs of the PE or how well your organs are working  · An EKG  test records your heart rhythm and how fast your heart beats  It is used to check for abnormal heart function  · A chest x-ray  may show signs of a lung infection or other damage  · A CT scan  may show the PE  You may be given contrast liquid to help your lungs show up better in the pictures  Tell the healthcare provider if you have ever had an allergic reaction to contrast liquid  · A lung scan , or V/Q scan, may show how well blood and oxygen flow in your lungs  A small amount of contrast liquid is used to study your airflow (V) and blood flow (Q)  First, you breathe in medical gas   Then, contrast liquid is injected into a vein  Pictures are taken to see how well your lungs take in oxygen  How is a PE treated? Treatment depends on what the embolus is made of and where the PE is located in your lung  You may need any of the following:  · Clot busters  are emergency medicines that work to dissolve blood clots  · Blood thinners  help prevent blood clots  Clots can cause strokes, heart attacks, and death  The following are general safety guidelines to follow while you are taking a blood thinner:    ? Watch for bleeding and bruising while you take blood thinners  Watch for bleeding from your gums or nose  Watch for blood in your urine and bowel movements  Use a soft washcloth on your skin, and a soft toothbrush to brush your teeth  This can keep your skin and gums from bleeding  If you shave, use an electric shaver  Do not play contact sports  ? Tell your dentist and other healthcare providers that you take a blood thinner  Wear a bracelet or necklace that says you take this medicine  ? Do not start or stop any other medicines unless your healthcare provider tells you to  Many medicines cannot be used with blood thinners  ? Take your blood thinner exactly as prescribed by your healthcare provider  Do not skip does or take less than prescribed  Tell your provider right away if you forget to take your blood thinner, or if you take too much  ? Warfarin  is a blood thinner that you may need to take  The following are things you should be aware of if you take warfarin:     § Foods and medicines can affect the amount of warfarin in your blood  Do not make major changes to your diet while you take warfarin  Warfarin works best when you eat about the same amount of vitamin K every day  Vitamin K is found in green leafy vegetables and certain other foods  Ask for more information about what to eat when you are taking warfarin      § You will need to see your healthcare provider for follow-up visits when you are on warfarin  You will need regular blood tests  These tests are used to decide how much medicine you need  · A vena cava filter  may be placed inside your vena cava to prevent another PE  The vena cava is a large vein that brings blood from your lower body up to your heart  The filter may help trap an embolus and prevent it from going into your lungs  · Surgery  called a thrombectomy may be done to remove the PE  A procedure called thrombolysis may instead be done to inject a clot buster that helps break the clot apart  What can I do to prevent a PE? · Change your body position or move around often  Move and stretch in your seat several times each hour if you travel by car or work at a desk  In an airplane, get up and walk every hour  · Exercise regularly to help increase your blood flow  Walking is a good low-impact exercise  Talk to your healthcare provider about the best exercise plan for you  · Maintain a healthy weight  Ask your healthcare provider how much you should weigh  Ask him or her to help you create a weight loss plan if you are overweight  · Do not smoke  Nicotine and other chemicals in cigarettes and cigars can damage blood vessels and increase your risk for another PE  Ask your healthcare provider for information if you currently smoke and need help to quit  E-cigarettes or smokeless tobacco still contain nicotine  Talk to your healthcare provider before you use these products  · Ask about birth control if you are a woman who takes the pill  A birth control pill increases the risk for blood clots in certain women  The risk is higher if you are also older than 35, smoke cigarettes, or have a blood clotting disorder  Talk to your healthcare provider about other ways to prevent pregnancy, such as a cervical cap or intrauterine device (IUD)  Call your local emergency number (911 in the [facility]) if:   · You feel lightheaded, short of breath, and have chest pain      · You cough up blood  When should I call my doctor? · Your arm or leg feels warm, tender, and painful  It may look swollen and red  · You have questions or concerns about your condition or care  CARE AGREEMENT:   You have the right to help plan your care  Learn about your health condition and how it may be treated  Discuss treatment options with your healthcare providers to decide what care you want to receive  You always have the right to refuse treatment  The above information is an  only  It is not intended as medical advice for individual conditions or treatments  Talk to your doctor, nurse or pharmacist before following any medical regimen to see if it is safe and effective for you  © Copyright [employer] 2021 Information is for End User's use only and may not be sold, redistributed or otherwise used for commercial purposes   All illustrations and images included in CareNotes® are the copyrighted property of [employer]  or [address] [place] [address] [employer]

## 2022-01-26 NOTE — RESPIRATORY THERAPY NOTE
Home Oxygen Qualifying Test       Patient name: Shelley Graham        : 1945   Date of Test:  2022  Diagnosis:      Home Oxygen Test:    Medicare Guidelines require item(s) 1-5 on all ambulatory patients or 1 and 2 on non-ambulatory patients  1   Baseline SPO2 on Room Air at rest 87%  2   SPO2 during exercise on Room Air 82 %  During exercise monitor SpO2  If SPO2 increases >=89% with ambulation do not add supplemental             oxygen  If <= 88% on room air add O2 via NC and titrate patient  Patient must be ambulated with O2 and titrated to > 88% with exertion  3   SPO2 on Oxygen at rest 95 % 3 lpm     4   SPO2 during exercise on Oxygen  94% a liter flow of 6 lpm     5   Exercise performed:                    [x]  Supplemental Home Oxygen is indicated  []  Client does not qualify for home oxygen        Respiratory Additional Notes-   Judson Frazier, RT

## 2022-01-26 NOTE — CASE MANAGEMENT
Case Management Discharge Planning Note    Patient name Trey Tomas  Location [address] [place] [address] Road [facility] [address]  [address]ovany Jadwigi [address]-* MRN [id number]  : 1945 Date 2022       Current Admission Date: 2022  Current Admission Diagnosis:Centrilobular emphysema [facility])   Patient Active Problem List    Diagnosis Date Noted    Acute pulmonary embolism without acute cor pulmonale ([facility] ) 2022    COVID-19 vaccination declined 2022    Former smoker 12/10/2018    Centrilobular emphysema ([facility] [facility] ) 2018    Essential hypertension 2018    CAD (coronary artery disease) 2018    Mixed hyperlipidemia 2018    Stage [facility] 2018      LOS (days): 4  Geometric Mean LOS (GMLOS) (days): 4 80  Days to GMLOS:1     OBJECTIVE:  Risk of Unplanned Readmission Score: 14         Current admission status: Inpatient   Preferred Pharmacy:   [facility] [address] [place]  [phone]  [address]  Phone: [phone] Fax: [phone]    Primary Care Provider: Erica Nettles DO    Primary Insurance: MEDICARE  [facility]    DISCHARGE DETAILS:    IMM Given (Date):: 22 (pt is agreeable with discharge)  IMM Given to[de-identified] Patient     CM IMM given by verbal consent to pt    Pt's copy placed with d/c notes for RN to give to pt upon d/c

## 2022-01-26 NOTE — DISCHARGE SUMMARY
[facility] Discharge Summary - Medical Paige Rubi 68 y o  female MRN: [id number]    [address] / [address]: [address] [address] Jose WRIGHT Encounter: [id number]    BRIEF OVERVIEW  Admitting Provider: Alexa Gottlieb MD  Discharge Provider: Alexa Gottlieb MD    Discharge To: Home/self-care  Facility:  Patient's residence    Outpatient Follow-Up:   [facility]    Things to address at first follow up visit:   · Has the patient been compliant with her medications (Eliquis, Keflex, and Florastor)? · Has the patient received any follow-up with any other specialties? · Has the patient had any issues with bruising/bleeding disorders? · Has the patient had any fevers chills or other symptoms of infection? · Has the patient had any chest pain, shortness of breath, trouble breathing? Labs and results pending at discharge: none      Admission Date: 1/22/2022     Discharge Date: 01/26/22    Primary Discharge Diagnosis  Principal Problem:    Sepsis with organ dysfunction [facility])  Active Problems:    [facility]obular [facility] ([facility] )    Essential hypertension    CAD (coronary artery disease)    Mixed hyperlipidemia    Stage 3 chronic kidney disease (HCC)    Acute on chronic respiratory failure with hypoxia (HCC)    Cellulitis of right lower extremity    Pneumonia due to COVID-19 virus    COVID-19 vaccination declined    Acute pulmonary embolism without acute cor pulmonale ([facility] [address] )  Resolved Problems:    Symptoms of upper respiratory infection (URI)    Consulting Providers   Dr Jocy Buchanan MD- Pulmonology      [address]  ST[address]    Procedures Performed/Pertinent Test results  Labs  1/25: WBC 14 34, Hgb 10 5, Glu 88, AST 90, , Albumin 2 0, Ptt 93, Pt 15 1, INR 1 21, Procal 0 69  1/24:  WBC 19 61, Hgb 11 3, Glu 244, , , Alk Phos 126,  0,   1/23: WBC 26 5, CR 1 09, , Ddimer 6 13,   1/22: WBC 27 7, K 3 4, Cr 1 48, Lactic Acid 1 8  UA moderate blood, trace Leuks, Innumerable Bacteria, Occasional Mucus threads    Imaging  1/23: CTA multiple segmental and subsegmental pulmonary emboli without right heart strain  1/22 CXR Mild bilateral ground glass opacity due to Covid-19  HPI  Patient is a 68-year-old female with a history of CKD 3, hypertension, hyperlipidemia, emphysema requiring 3 L of oxygen at home who presents with worsening right lower extremity redness warmth and pain  Rash began about 3 weeks ago and has gotten progressively larger and more painful  She also reports having been more short of breath over the past week  She denies any other systemic symptoms including fever, chills, fatigue, chest pain, abdominal pain, nausea, vomiting, diarrhea, dysuria, headache, or dizziness  Patient has not seen a doctor since before Leeann  She is not taking her home prescribed atorvastatin, lisinopril or metoprolol because her doctor would not refill these medications without evaluating her and she did not want to leave the home due to Leeann  She has been taking her aspirin, and albuterol nebulizer  She is not COVID vaccinated      ED: Found to be septic with exam consistent with right lower extremity cellulitis  Given a dose of vancomycin and a L bolus of normal saline    Hospital Course  Sepsis with organ dysfunction  Upon admission to the hospital patient met SIRS criteria tachycardia tachypnea leukocytosis  Patient also had obvious infection form of cellulitis in her right and left thighs  Patient was placed on vancomycin x1 day until patient was 24 hours free of SIRS criteria  Acute pulmonary embolism without cor pulmonale  Due to patient's increasing dyspnea exertion and tachycardia patient was given CTA  CTA found multiple segmental and subsegmental pulmonary emboli without right heart strain  Pulmonology was then consulted  Patient was transition from ACLS low to therapeutic heparin dosing    Patient will require 6 months of anticoagulation  Patient received 1st month free from hospital through coupon  Transitioned to Eliis on discharge  COVID-19  Patient on admission was found to have COVID-19  Patient was started on mild pathway  Of note patient's home requirement of oxygen was 3 L nasal cannula which was increased upon admission to 6 L  Patient received 3 days of dexamethasone and remdesivir  Patient was not a candidate for monoclonal antibodies  Cellulitis  On admission patient had bilateral thigh cellulitis with marked erythema swelling and tenderness  Patient was given 1 dose of vancomycin which greatly improved area patient's wound borders have been receding since admission  Physical Exam at Discharge  /78 (BP Location: Left arm)   Pulse 80   Temp 97 9 °F (36 6 °C) (Oral)   Resp 21   Ht 5' 1 5" (1 562 m)   Wt 78 1 kg (172 lb 2 9 oz)   SpO2 90%   BMI 32 01 kg/m²     Physical Exam  Constitutional:       Appearance: Normal appearance  HENT:      Head: Normocephalic and atraumatic  Mouth/Throat:      Mouth: Mucous membranes are dry  Eyes:      Pupils: Pupils are equal, round, and reactive to light  Cardiovascular:      Rate and Rhythm: Normal rate  Pulses: Normal pulses  Heart sounds: Normal heart sounds  Pulmonary:      Effort: Pulmonary effort is normal       Breath sounds: Normal breath sounds  Abdominal:      Palpations: Abdomen is soft  Tenderness: There is no abdominal tenderness  Musculoskeletal:         General: Normal range of motion  Skin:     General: Skin is warm and dry  Neurological:      Mental Status: She is alert and oriented to person, place, and time     Psychiatric:         Mood and Affect: Mood normal          Behavior: Behavior normal            Medications   Discharge Medication List as of 1/26/2022  6:09 PM          Discharge Medication List as of 1/26/2022  6:09 PM      CONTINUE these medications which have NOT CHANGED    Details albuterol (2 5 mg/3 mL) 0 083 % nebulizer solution USE 1 VIAL IN NEBULIZER 3 TIMES DAILY, Normal      albuterol (Ventolin HFA) 90 mcg/act inhaler Inhale 2 puffs 4 (four) times a day, Starting Mon 3/9/2020, Normal      aspirin (ASPIR-LOW) 81 mg EC tablet Take 1 tablet by mouth daily, Starting Thu 1/23/2014, Historical Med      atorvastatin (LIPITOR) 40 mg tablet Take 1 tablet by mouth once daily, Normal      Calcium 600 MG tablet Take by mouth, Historical Med      DAILY MULTIPLE VITAMINS PO Take by mouth, Historical Med      lisinopril (ZESTRIL) 5 mg tablet Take 1 tablet (5 mg total) by mouth daily, Starting Wed 6/9/2021, Normal      metoprolol tartrate (LOPRESSOR) 25 mg tablet Take 1/2 (one-half) tablet by mouth twice daily, Normal      Trelegy Ellipta 100-62 5-25 MCG/INH inhaler INHALE 1 PUFF ONCE DAILY RINSE MOUTH AFTER USE, Normal            Discharge Medication List as of 1/26/2022  6:09 PM      START taking these medications    Details   apixaban (Eliquis) 5 mg Multiple Dosages:Starting Mon 1/24/2022, Until Sun 1/30/2022 at 2359, THEN Starting Mon 1/31/2022, Until Tue 3/1/2022 at 2359Take 2 tablets (10 mg total) by mouth 2 (two) times a day for 7 days, THEN 1 tablet (5 mg total) 2 (two) times a day , Normal      cephalexin (KEFLEX) 500 mg capsule Take 1 capsule (500 mg total) by mouth every 12 (twelve) hours for 8 doses, Starting Wed 1/26/2022, Until Sun 1/30/2022, Normal      saccharomyces boulardii (FLORASTOR) 250 mg capsule Take 1 capsule (250 mg total) by mouth 2 (two) times a day for 4 days, Starting Wed 1/26/2022, Until Sun 1/30/2022, Normal            Discharge Medication List as of 1/26/2022  6:09 PM      STOP taking these medications       calcium carbonate-vitamin D (OSCAL-D) 500 mg-200 units per tablet Comments:   Reason for Stopping:                  Allergies  No Known Allergies    Diet restrictions: none  Activity restrictions: none  Code Status: Level 1 - Full Code  Advance Directive and Living Will: <no information>  Power of :    POLST:      Discharge Condition: stable      Discharge  Statement   I spent 30 minutes discharging the patient  This time was spent on the day of discharge  I had direct contact with the patient on the day of discharge  Additional documentation is required if more than 30 minutes were spent on discharge

## 2022-01-26 NOTE — PLAN OF CARE
Problem: Potential for Falls  Goal: Patient will remain free of falls  Description: INTERVENTIONS:  - Educate patient/family on patient safety including physical limitations  - Instruct patient to call for assistance with activity   - Consult OT/PT to assist with strengthening/mobility   - Keep Call bell within reach  - Keep bed low and locked with side rails adjusted as appropriate  - Keep care items and personal belongings within reach  - Initiate and maintain comfort rounds  - Make Fall Risk Sign visible to staff  - Offer Toileting every 2 Hours, in advance of need  - Initiate/Maintain bed alarm  - Obtain necessary fall risk management equipment  - Apply yellow socks and bracelet for high fall risk patients  - Consider moving patient to room near nurses station  Outcome: Progressing     Problem: MOBILITY - ADULT  Goal: Maintain or return to baseline ADL function  Description: INTERVENTIONS:  -  Assess patient's ability to carry out ADLs; assess patient's baseline for ADL function and identify physical deficits which impact ability to perform ADLs (bathing, care of mouth/teeth, toileting, grooming, dressing, etc )  - Assess/evaluate cause of self-care deficits   - Assess range of motion  - Assess patient's mobility; develop plan if impaired  - Assess patient's need for assistive devices and provide as appropriate  - Encourage maximum independence but intervene and supervise when necessary  - Involve family in performance of ADLs  - Assess for home care needs following discharge   - Consider OT consult to assist with ADL evaluation and planning for discharge  - Provide patient education as appropriate  Outcome: Progressing  Goal: Maintains/Returns to pre admission functional level  Description: INTERVENTIONS:  - Perform BMAT or MOVE assessment daily    - Set and communicate daily mobility goal to care team and patient/family/caregiver     - Collaborate with rehabilitation services on mobility goals if consulted  - Perform Range of Motion 3 times a day  - Reposition patient every 2 hours    - Dangle patient 3 times a day  - Stand patient 3 times a day  - Ambulate patient 3 times a day  - Out of bed to chair 3 times a day   - Out of bed for meals 3 times a day  - Out of bed for toileting  - Record patient progress and toleration of activity level   Outcome: Progressing     Problem: RESPIRATORY - ADULT  Goal: Achieves optimal ventilation and oxygenation  Description: INTERVENTIONS:  - Assess for changes in respiratory status  - Assess for changes in mentation and behavior  - Position to facilitate oxygenation and minimize respiratory effort  - Oxygen administered by appropriate delivery if ordered  - Initiate smoking cessation education as indicated  - Encourage broncho-pulmonary hygiene including cough, deep breathe, Incentive Spirometry  - Assess the need for suctioning and aspirate as needed  - Assess and instruct to report SOB or any respiratory difficulty  - Respiratory Therapy support as indicated  Outcome: Progressing     Problem: INFECTION - ADULT  Goal: Absence or prevention of progression during hospitalization  Description: INTERVENTIONS:  - Assess and monitor for signs and symptoms of infection  - Monitor lab/diagnostic results  - Monitor all insertion sites, i e  indwelling lines, tubes, and drains  - Monitor endotracheal if appropriate and nasal secretions for changes in amount and color  - Dexter appropriate cooling/warming therapies per order  - Administer medications as ordered  - Instruct and encourage patient and family to use good hand hygiene technique  - Identify and instruct in appropriate isolation precautions for identified infection/condition  Outcome: Progressing

## 2022-01-27 ENCOUNTER — TRANSITIONAL CARE MANAGEMENT (OUTPATIENT)
Dept: FAMILY MEDICINE CLINIC | Facility: CLINIC | Age: 77
End: 2022-01-27

## 2022-01-28 LAB
BACTERIA BLD CULT: NORMAL
BACTERIA BLD CULT: NORMAL

## 2022-01-31 DIAGNOSIS — J43.2 CENTRILOBULAR EMPHYSEMA (HCC): ICD-10-CM

## 2022-01-31 DIAGNOSIS — E78.5 DYSLIPIDEMIA: ICD-10-CM

## 2022-01-31 DIAGNOSIS — I10 ESSENTIAL HYPERTENSION: ICD-10-CM

## 2022-01-31 DIAGNOSIS — I25.10 CAD IN NATIVE ARTERY: ICD-10-CM

## 2022-01-31 RX ORDER — FLUTICASONE FUROATE, UMECLIDINIUM BROMIDE AND VILANTEROL TRIFENATATE 100; 62.5; 25 UG/1; UG/1; UG/1
POWDER RESPIRATORY (INHALATION)
Qty: 60 EACH | Refills: 5 | Status: SHIPPED | OUTPATIENT
Start: 2022-01-31 | End: 2022-06-08 | Stop reason: SDUPTHER

## 2022-01-31 RX ORDER — ALBUTEROL SULFATE 90 UG/1
2 AEROSOL, METERED RESPIRATORY (INHALATION) 4 TIMES DAILY
Qty: 18 G | Refills: 2 | Status: SHIPPED | OUTPATIENT
Start: 2022-01-31 | End: 2022-06-06 | Stop reason: SDUPTHER

## 2022-01-31 RX ORDER — LISINOPRIL 5 MG/1
5 TABLET ORAL DAILY
Qty: 30 TABLET | Refills: 0 | Status: SHIPPED | OUTPATIENT
Start: 2022-01-31 | End: 2022-03-01 | Stop reason: SDUPTHER

## 2022-01-31 RX ORDER — ATORVASTATIN CALCIUM 40 MG/1
40 TABLET, FILM COATED ORAL DAILY
Qty: 90 TABLET | Refills: 0 | Status: SHIPPED | OUTPATIENT
Start: 2022-01-31 | End: 2022-05-06 | Stop reason: SDUPTHER

## 2022-01-31 NOTE — TELEPHONE ENCOUNTER
Patient needs an appointment for continuity of care but tested Positive for Covid 1/22/2022  Please review and advise  Thank you

## 2022-01-31 NOTE — TELEPHONE ENCOUNTER
Dr Haley Benites ; pls send refill for this request meds but pt  Needs to be seen has up coming [facility] appt  Last seen in 2020   Thanks

## 2022-02-02 ENCOUNTER — DOCUMENTATION (OUTPATIENT)
Dept: FAMILY MEDICINE CLINIC | Facility: CLINIC | Age: 77
End: 2022-02-02

## 2022-02-02 DIAGNOSIS — J43.2 CENTRILOBULAR EMPHYSEMA (HCC): Primary | ICD-10-CM

## 2022-02-07 ENCOUNTER — OFFICE VISIT (OUTPATIENT)
Dept: FAMILY MEDICINE CLINIC | Facility: CLINIC | Age: 77
End: 2022-02-07
Payer: MEDICARE

## 2022-02-07 VITALS
HEIGHT: 62 IN | BODY MASS INDEX: 31.62 KG/M2 | OXYGEN SATURATION: 95 % | HEART RATE: 84 BPM | DIASTOLIC BLOOD PRESSURE: 80 MMHG | WEIGHT: 171.8 LBS | TEMPERATURE: 98.4 F | RESPIRATION RATE: 18 BRPM | SYSTOLIC BLOOD PRESSURE: 110 MMHG

## 2022-02-07 DIAGNOSIS — Z76.89 ENCOUNTER FOR SUPPORT AND COORDINATION OF TRANSITION OF CARE: Primary | ICD-10-CM

## 2022-02-07 DIAGNOSIS — Z23 ENCOUNTER FOR IMMUNIZATION: ICD-10-CM

## 2022-02-07 DIAGNOSIS — L03.115 CELLULITIS OF RIGHT LOWER EXTREMITY: ICD-10-CM

## 2022-02-07 DIAGNOSIS — J43.2 CENTRILOBULAR EMPHYSEMA (HCC): ICD-10-CM

## 2022-02-07 DIAGNOSIS — J12.82 PNEUMONIA DUE TO COVID-19 VIRUS: ICD-10-CM

## 2022-02-07 DIAGNOSIS — Z11.59 NEED FOR HEPATITIS C SCREENING TEST: ICD-10-CM

## 2022-02-07 DIAGNOSIS — I26.99 ACUTE PULMONARY EMBOLISM WITHOUT ACUTE COR PULMONALE, UNSPECIFIED PULMONARY EMBOLISM TYPE (HCC): ICD-10-CM

## 2022-02-07 DIAGNOSIS — T36.95XA ANTIBIOTICS CAUSING ADVERSE EFFECT IN THERAPEUTIC USE, INITIAL ENCOUNTER: ICD-10-CM

## 2022-02-07 DIAGNOSIS — I25.119 CORONARY ARTERY DISEASE INVOLVING NATIVE HEART WITH ANGINA PECTORIS, UNSPECIFIED VESSEL OR LESION TYPE (HCC): ICD-10-CM

## 2022-02-07 DIAGNOSIS — J96.11 CHRONIC RESPIRATORY FAILURE WITH HYPOXIA (HCC): ICD-10-CM

## 2022-02-07 DIAGNOSIS — U07.1 PNEUMONIA DUE TO COVID-19 VIRUS: ICD-10-CM

## 2022-02-07 PROCEDURE — 90662 IIV NO PRSV INCREASED AG IM: CPT

## 2022-02-07 PROCEDURE — 99495 TRANSJ CARE MGMT MOD F2F 14D: CPT | Performed by: FAMILY MEDICINE

## 2022-02-07 PROCEDURE — G0008 ADMIN INFLUENZA VIRUS VAC: HCPCS

## 2022-02-07 RX ORDER — SACCHAROMYCES BOULARDII 250 MG
250 CAPSULE ORAL 2 TIMES DAILY
Qty: 20 CAPSULE | Refills: 0 | Status: SHIPPED | OUTPATIENT
Start: 2022-02-07 | End: 2022-02-17

## 2022-02-07 RX ORDER — CEPHALEXIN 500 MG/1
500 CAPSULE ORAL EVERY 6 HOURS SCHEDULED
Qty: 28 CAPSULE | Refills: 0 | Status: SHIPPED | OUTPATIENT
Start: 2022-02-07 | End: 2022-02-14

## 2022-02-07 NOTE — PROGRESS NOTES
Assessment/Plan:    Keflex restarted for another 7 days for persistent cellulitis  Patient will follow-up in 1 week to recheck along with Medicare annual wellness visit    Pulmonary embolism stable on Eliquis, which she needs to be on for 6 months  Chronic respiratory failure on home oxygen, currently on 6 LPM   Awaiting empty oxygen tanks to be replaced  Referral to complex care management to coordinate patient's supply of oxygen and Eliquis  Encouraged patient to receive her COVID vaccine in 2-3 months from her COVID pneumonia  Diagnoses and all orders for this visit:    Encounter for support and coordination of transition of care    Acute pulmonary embolism without acute cor pulmonale, unspecified pulmonary embolism type [facility])  -     Ambulatory Referral to Complex Care Management Program; Future    Cellulitis of right lower extremity  -     cephalexin (KEFLEX) 500 mg capsule; Take 1 capsule (500 mg total) by mouth every 6 (six) hours for 7 days    Pneumonia due to COVID-19 virus    Chronic respiratory failure with hypoxia ([facility] [address] )  -     Ambulatory Referral to Complex Care Management Program; Future    [facility]  -     Ambulatory Referral to Complex Care Management Program; Future    Encounter for immunization  -     influenza vaccine, high-dose, PF 0 7 mL (FLUZONE HIGH-DOSE)    Need for hepatitis C screening test  -     Hepatitis C antibody; Future    Coronary artery disease involving native heart with angina pectoris, unspecified vessel or lesion type ([facility] )  -     Lipid Panel with Direct LDL reflex; Future    Antibiotics causing adverse effect in therapeutic use, initial encounter  -     saccharomyces boulardii (FLORASTOR) 250 mg capsule; Take 1 capsule (250 mg total) by mouth 2 (two) times a day for 10 days        Discussed with patients the benefits, contraindications and side effects of the following vaccines: Influenza   Discussed 1 components of the vaccine/s        TCM Call (since 1/7/2022)     Date and time call was made  1/26/2022  9:04 AM    Hospital care reviewed  Records not available        Patient was [facility]ialized at  [address]        Date of Admission  01/22/22    Date of discharge  01/26/22    Diagnosis  sepsis with organ dysfuntion     Disposition  Home    Current Symptoms  None      TCM Call (since 1/7/2022)     Post hospital issues  None    Should patient be enrolled in anticoag monitoring? No    Scheduled for follow up? Yes    Did you obtain your prescribed medications  Yes    Do you need help managing your prescriptions or medications  No    Is transportation to your appointment needed  No    I have advised the patient to call PCP with any new or worsening symptoms  eliezer castañeda CMA     Living Arrangements  Family members    Support System  None    Are you recieving any outpatient services  No    Are you recieving home care services  No    Are you using any community resources  No    Current waiver services  No    Have you fallen in the last 12 months  No    Interperter language line needed  No    Comments  LM for call back on 01/27/2022 @ 9AM but pt  has TCM F/U visit sched on 02/07/2022 @ 2 pm with Dr Gama [place]  Subjective:      Patient ID: Mee Crabtree is a 68 y o  female  Patient is a 70-year-old former heavy female smoker CKD 3, hypertension, hyperlipidemia, emphysema requiring 3 L of oxygen at home presenting today for a TCM visit after being hospitalized for right lower extremity cellulitis accompanied by acute pulmonary embolism without right heart strain and COVID-19 pneumonia  Acute pulmonary embolism without cor pulmonale  Due to patient's increasing dyspnea exertion and tachycardia patient was given CTA  CTA found multiple bilateral segmental and subsegmental pulmonary emboli without right heart strain  Pulmonology was then consulted  Patient was transition from ACLS low to therapeutic heparin dosing    Patient will require 6 months of anticoagulation  Patient received 1st month free from hospital through coupon  Transitioned to Eliquis on discharge  - adherence to Eliquis: Y  - history of clotting or bleeding disorders in the past: N  - hx of MI or CVA: N   - chest pain: N  - dyspnea: N  - hemoptysis: N  - occasional epistaxis that resolves spontaneously     COVID-19  Patient on admission was found to have COVID-19  Patient was started on mild pathway  Of note patient's home requirement of oxygen was 3 L nasal cannula which was increased upon admission to 6 L  Patient received 3 days of dexamethasone and remdesivir, and was able to be weaned down back to her home oxygen requirement  Patient was able to be discharged without continuation of dexamethasone  Patient was not a candidate for monoclonal antibodies  - fever or chills: N  - patient was discharged on 6 LPM via N/C, portable gaseous oxygen system   - still waiting on O2 tank supply - only has one large one elft   - COVID vaccine due in 2-3 months     Cellulitis  On admission patient had bilateral thigh cellulitis with marked erythema swelling and tenderness  Patient was given 1 dose of vancomycin which greatly improved area patient's wound borders have been receding since admission   - completion of Keflex (1/30/22): Yes  - lower extremity swelling, erythema, tenderness:  Improved but still persistent            The following portions of the patient's history were reviewed and updated as appropriate: allergies, current medications, past family history, past medical history, past social history, past surgical history and problem list     Review of Systems   All other systems reviewed and are negative          Objective:      /80 (BP Location: Left arm, Patient Position: Sitting, Cuff Size: Standard)   Pulse 84   Temp 98 4 °F (36 9 °C) (Tympanic)   Resp 18   Ht 5' 1 5" (1 562 m)   Wt 77 9 kg (171 lb 12 8 oz)   SpO2 95%   BMI 31 94 kg/m²          Physical Exam  Vitals reviewed  Constitutional:       General: She is not in acute distress  Appearance: She is obese  She is not diaphoretic  HENT:      Head: Normocephalic  Eyes:      Conjunctiva/sclera: Conjunctivae normal    Cardiovascular:      Rate and Rhythm: Normal rate and regular rhythm  Pulses: Normal pulses  Pulmonary:      Effort: Pulmonary effort is normal  No respiratory distress  Breath sounds: Examination of the left-upper field reveals decreased breath sounds  Examination of the left-middle field reveals decreased breath sounds  Decreased breath sounds present  No wheezing or rhonchi  Comments: N/C  Musculoskeletal:      Comments: No inguinal lymphadenopathy   Skin:     General: Skin is warm  Coloration: Skin is not pale  Findings: Erythema (Large area of induration on patient's medial aspect of her right thigh superior to her knee as seen on photo below  Accompanied with tenderness and warmth) present  Neurological:      Mental Status: She is alert and oriented to person, place, and time  Mental status is at baseline     Psychiatric:         Mood and Affect: Mood normal          Behavior: Behavior normal

## 2022-02-07 NOTE — PATIENT INSTRUCTIONS
Pulmonary Embolism   AMBULATORY CARE:   A pulmonary embolism (PE)  is the sudden blockage of a blood vessel in the lungs by an embolus  An embolus is a small piece of blood clot, fat, air, or tumor cells  The embolus cuts off the blood supply to your lungs  A PE can become life-threatening  Common signs and symptoms of a PE:   · Sudden shortness of breath or fast breathing    · Sudden chest pain that is worse when you take a deep breath    · Fast heartbeat    · Fever and coughing up blood    · Bluish nails    · Cold, pale, clammy skin    · Fainting    Call your local emergency number (911 in the [facility]) if:   · You feel lightheaded, short of breath, and have chest pain  · You cough up blood  Call your doctor if:   · Your arm or leg feels warm, tender, and painful  It may look swollen and red  · You have questions or concerns about your condition or care  Treatment  depends on what the embolus is made of and where it is in your lung  Treatment may include any of the following:  · Clot busters  are emergency medicines that work to dissolve blood clots  · Blood thinners  help prevent blood clots  Clots can cause strokes, heart attacks, and death  The following are general safety guidelines to follow while you are taking a blood thinner:    ? Watch for bleeding and bruising while you take blood thinners  Watch for bleeding from your gums or nose  Watch for blood in your urine and bowel movements  Use a soft washcloth on your skin, and a soft toothbrush to brush your teeth  This can keep your skin and gums from bleeding  If you shave, use an electric shaver  Do not play contact sports  ? Tell your dentist and other healthcare providers that you take a blood thinner  Wear a bracelet or necklace that says you take this medicine  ? Do not start or stop any other medicines unless your healthcare provider tells you to  Many medicines cannot be used with blood thinners      ? Take your blood thinner exactly as prescribed by your healthcare provider  Do not skip does or take less than prescribed  Tell your provider right away if you forget to take your blood thinner, or if you take too much  ? Warfarin  is a blood thinner that you may need to take  The following are things you should be aware of if you take warfarin:     § Foods and medicines can affect the amount of warfarin in your blood  Do not make major changes to your diet while you take warfarin  Warfarin works best when you eat about the same amount of vitamin K every day  Vitamin K is found in green leafy vegetables and certain other foods  Ask for more information about what to eat when you are taking warfarin  § You will need to see your healthcare provider for follow-up visits when you are on warfarin  You will need regular blood tests  These tests are used to decide how much medicine you need  · A vena cava filter  may be placed inside your vena cava to prevent another PE  The vena cava is a large vein that brings blood from your lower body up to your heart  The filter may help trap an embolus and prevent it from going into your lungs  · Surgery  called a thrombectomy may be done to remove the PE  A procedure called thrombolysis may instead be done to inject a clot buster that helps break the clot apart  Prevent another PE:   · Change your body position or move around often  Move and stretch in your seat several times each hour if you travel by car or work at a desk  In an airplane, get up and walk every hour  · Exercise regularly to help increase your blood flow  Walking is a good low-impact exercise  Talk to your healthcare provider about the best exercise plan for you  · Maintain a healthy weight  Ask your healthcare provider how much you should weigh  Ask him or her to help you create a weight loss plan if you are overweight  · Do not smoke    Nicotine and other chemicals in cigarettes and cigars can damage blood vessels and increase your risk for another PE  Ask your healthcare provider for information if you currently smoke and need help to quit  E-cigarettes or smokeless tobacco still contain nicotine  Talk to your healthcare provider before you use these products  · Ask about birth control if you are a woman who takes the pill  A birth control pill increases the risk for blood clots in certain women  The risk is higher if you are also older than 35, smoke cigarettes, or have a blood clotting disorder  Talk to your healthcare provider about other ways to prevent pregnancy, such as a cervical cap or intrauterine device (IUD)  Follow up with your doctor or specialist as directed:  Make an appointment as soon as possible  You may also need to come in regularly for scans to check for blood clots  Your blood may checked to see how long it takes to clot  Your doctor or specialist will tell you if you need to have this test and how often to have it  Write down your questions so you remember to ask them during your visits  COVID-19 and Chronic Health Conditions   AMBULATORY CARE:   What you need to know about coronavirus disease 2019 (COVID-19) and chronic health conditions: Your chronic condition may increase your risk for COVID-19 or serious problems it can cause  Healthcare providers might need to make changes that affect how you usually manage your chronic health condition  Providers may change hours of operation or not have patients come in to be seen  You may not be able to make appointments to get blood drawn or to have tests or procedures  This may continue until the virus that causes COVID-19 is controlled  Until then, you can take steps to manage your condition  The steps will also lower your risk for COVID-19 or the serious problems it causes  If you do develop COVID-19, healthcare providers will tell you when it is okay to be around others after you recover   This depends on your chronic condition, any symptoms of COVID-19 that developed, and how severe the symptoms were  What you need to know about serious problems from the virus:  Serious health problems may improve or continue for weeks or months, and possibly years  Health problems that continue may be called long COVID  · The virus can affect many parts of the body  Information is still being learned  You may develop these or other health problems:    ? Serious lower respiratory conditions, such as pneumonia or acute respiratory distress syndrome (ARDS)    ? Blood vessel damage, leading to blood clots    ? Organ damage from a lack of oxygen or from blood clots    ? Sleep problems    ? Problems thinking clearly, remembering information, or concentrating    ? Mood changes, depression, or anxiety    · Anyone can develop serious problems from the virus,  but some health conditions increase the risk  Healthcare professionals are still learning how the virus affects a person who has a chronic health condition  Protect yourself from infection, even if your chronic condition is not listed below  More is being learned about the virus every day  Health conditions known to increase the risk for COVID-19 or its serious complications include the following:    ? Obesity, diabetes, cancer, Down syndrome, or a liver disease    ? Kidney failure, chronic kidney disease, or sickle cell disease    ? Lung disease, COPD, moderate-to-severe asthma, cystic fibrosis, or pulmonary fibrosis (scarring in your lungs)    ? A severe heart disease, such as coronary artery disease or heart failure    ? A brain blood vessel disorder or disease, or a condition such as dementia    ? Hypertension (or high blood pressure), or thalassemia    ? An immune system problem, HIV or AIDS, or a blood, bone marrow, or organ transplant    · Other factors that increase your risk  are age 72 or older or living in a long-term care facility   Pregnancy, cigarette smoking, or long-term corticosteroid use can also increase your risk  If you think you may be infected with the coronavirus,  do the following to protect others:  · If emergency care is needed,  tell the  about the possible infection, or call ahead and tell the emergency department  · Call a healthcare provider  for instructions if symptoms are mild  Do not  arrive without calling first  Your provider will need to protect staff members and other patients  · Cover your mouth and nose  while you are getting medical care  This will help lower the risk of infecting others  Call your local emergency number [phone] in the [address] [place] [address],[address]) or an emergency department if:   · You have trouble breathing or shortness of breath  · You have chest pain or pressure that lasts longer than 5 minutes  · You become confused or hard to wake  · Your lips or face are blue  · You have a fever of 104°F (40°C) or higher  Call your doctor or healthcare provider if:   · You do not  have symptoms of COVID-19 but had close physical contact within 14 days with someone who tested positive  · You have questions or concerns about your COVID-19 or your chronic condition  How the 2019 coronavirus spreads: The virus spreads quickly and easily  The virus can be passed starting 2 days before symptoms begin or before a positive test if symptoms never begin  The following are ways the virus is thought to spread, but more information may be coming:  · Droplets are the main way all coronaviruses spread  The virus travels in droplets that form when a person talks, coughs, or sneezes  The droplets can also float in the air for minutes or hours  Infection happens when you breathe in the droplets or get them in your eyes or nose  Close personal contact with an infected person increases your risk for infection  This means being within 6 feet (2 meters) of the person for at least 15 minutes over 24 hours  · Person-to-person contact can spread the virus    For example, a person with the virus on his or her hands can spread it by shaking hands with someone  · The virus can stay on objects and surfaces for a short time  You may become infected by touching the object or surface and then touching your eyes or mouth  · An infected animal may be able to infect a person who touches it  This may happen at live markets or on a farm  Manage your chronic health condition during this time:  If you do not have a regular healthcare provider, experts recommend you contact a local [facility] center or health department  The following can help you manage your condition and prevent COVID-19:  · Get emergency care for your condition if needed  Talk to your healthcare providers about symptoms of your chronic condition that need immediate care  Your providers can help you create a plan or add exacerbation management to your plan  The plan will include when to go to an emergency department and when to call your local emergency number  This will depend on where you live and the services that are available during this time  · Go to dialysis appointments as scheduled  It is important to stay on schedule  You will need to have enough food to be able to follow the emergency diet plan if you must miss a session  The emergency diet needs to be part of the management plan for your condition  · Reschedule any upcoming appointments as needed  Medical facilities may be closed until the coronavirus is better controlled  This means you may need to reschedule a surgery, procedure, or check-up appointment  If you cannot have a phone or video appointment, you will need to make a new appointment  Some providers may be scheduling appointments several months in advance  Some surgeries and procedures will happen as scheduled  This depends on the medical condition and the reason for the surgery or procedure  You may need to have extra testing for COVID-19 several days before      · Follow any regular management plan you use  Your healthcare provider will tell you if you need to make any changes to your regular management plan  For example, if you have asthma, continue to follow your asthma action plan  If you have diabetes, you may need to check your blood sugar level more often  Stress and illness can make blood sugar levels go up  You may need to adjust medicine such as insulin  If you have a heart condition or high blood pressure, you may need to check your blood pressure more often  Stress and illness can also raise your blood pressure  · Talk to your healthcare providers about your medicines  You may be able to get more than 1 month of medicine at a time  This will lower the number of times you need to go to a pharmacy to get your medicines  Make sure you have enough medicine if you have a condition that can lead to an emergency  Examples include asthma medicines, insulin, or an epinephrine pen  Check the expiration dates on the medicines you currently have  Ask for refills as soon as possible, if needed  If it is not time to refill prescriptions, you may be able to get an emergency supply of some medicines  Medicine plans vary, so ask your healthcare provider or pharmacist for options  · Have supplies available in your home  If possible, get extra supplies you use regularly  Examples include absorbent pads, syringes, and wound cleaning solutions  This will limit the number of trips out of your home to get supplies  · Know the signs and symptoms of COVID-19  Signs and symptoms usually start about 5 days after infection but can take 2 to 14 days  Signs and symptoms range from mild to severe  You may feel like you have the flu or a bad cold  Your chronic health condition may cause some of the same symptoms COVID-19 causes  This can make it hard to know if a symptom is from COVID-19 or your chronic condition  Keep a record of any new or worsening symptom you have   This is especially important if you have a condition that often causes shortness of breath  Your provider can tell you if you should be tested for COVID-19  Information is still being learned  Tell your healthcare provider if you think you were infected but develop signs or symptoms not listed below:    ? A cough    ? Shortness of breath or trouble breathing that may become severe    ? A fever of at least 100 4°F, or 38°C (may be lower in adults 65 or older)    ? Chills that might include shaking    ? Muscle pain, body aches, or a headache    ? A sore throat    ? Suddenly not being able to taste or smell anything    ? Feeling very tired (fatigue)    ? Congestion (stuffy head and nose), or a runny nose    ? Diarrhea, nausea, or vomiting    What you can do to prevent having to go out of your home during this time:   · Ask your healthcare provider for other ways to have appointments  Some providers offer phone, video, or other types of appointments  · Have food, medicines, and other supplies delivered  Some pharmacies can send certain medicines to you through the mail  Grocery stores and restaurants may be able to deliver food and other items  If possible, have delivered items placed somewhere  Try not to have someone hand you an item  You will be so close to the person that the virus can spread between you  · Ask someone to get items you need  The person can get groceries, medicines, or other needed items for you  Choose a person who does not have signs or symptoms of COVID-19 or has tested negative for it  The person should not be waiting for test results  He or she should not have a condition that increases the risk for COVID-19 or serious problems it causes  What you need to know about COVID-19 vaccines: Your healthcare provider can give you more information about what to expect, depending on your chronic condition  · COVID-19 vaccines are given as a shot in 1 or 2 doses    A 2-dose vaccine is fully approved for use in those 16 years or older  Other vaccines have emergency use authorization (EUA)  An EUA means the vaccine is not approved but is given because the benefits outweigh the risks  Your healthcare provider can help you understand the benefits and risks  · A third dose is recommended for adults with a weakened immune system who get a 2-dose vaccine  The third dose is given at least 28 days after the second  · Even after you get the vaccine, continue social distancing and other measures  Experts are still learning how well the vaccines work to prevent infection, transmission, and severe illness  Although rare, you can become infected after you get the vaccine  You may also be able to pass the virus to others without knowing you are infected  · After you get the vaccine, check local, national, and international travel rules  Check to see if you need to be tested before you travel  You may also need to quarantine after you return  Some countries require proof of a negative test before you leave  You should also be tested 3 to 5 days after you return from another country  Lower your risk for COVID-19:  The best way to prevent infection is to avoid anyone who is infected, but this can be hard to do  An infected person can spread the virus before signs or symptoms begin, or even if signs or symptoms never develop  The following are ways to prevent the spread of the virus and lower your risk for COVID-19:      · Wash your hands often throughout the day  Use soap and water  Rub your soapy hands together, lacing your fingers  Wash the front and back of each hand, and in between your fingers  Use the fingers of one hand to scrub under the fingernails of the other hand  Wash for at least 20 seconds  Rinse with warm, running water for several seconds  Then dry your hands with a clean towel or paper towel  Use hand  that contains alcohol if soap and water are not available   Do not touch your eyes, nose, or mouth without washing your hands first  Teach children how to wash their hands  · Cover sneezes and coughs  Turn your face away and cover your mouth and nose with a tissue  Throw the tissue away  Use the bend of your arm if a tissue is not available  Then wash your hands well with soap and water or use hand   Turn your head and cover your face if someone near you is coughing or sneezing  Teach children how to cover a cough or sneeze  Remind them to wash their hands  · Make a habit of not touching your face  If you get the virus on your hands, you can transfer it to your eyes, nose, or mouth and become infected  · Wear a face covering (mask) around anyone who does not live in your home  A covering helps protect the person wearing it from being infected or passing the virus to others  Use a cloth covering with at least 2 layers  You can also create layers by putting a cloth face covering over a disposable non-medical mask  Cover your mouth and your nose  The covering should fit snugly against the bridge of your nose  Securely fasten it under your chin and on the sides of your face  Do not use coverings on children younger than 2 years or on anyone who has breathing problems or cannot remove it  Your healthcare provider can tell you what to do if you cannot wear a face covering  · Clean and disinfect high-touch surfaces and objects in your home often  Some surfaces and objects may need to be cleaned several times each day, depending on how often they are used  Use disinfecting wipes, or make a solution by mixing 4 teaspoons of bleach with 1 quart (4 cups) of water  Do not  use any cleaning or disinfecting products that can trigger an asthma attack or other breathing problems  Open windows or have circulating air as you clean  Do not  mix ammonia with bleach  This will create toxic fumes      How to follow social distancing guidelines:  Social distancing means people avoid close personal contact so the virus cannot spread from one person to another  Close personal contact means being within 6 feet (2 meters) of someone for at least 15 minutes over 24 hours  National and local social distancing rules vary  Rules may change over time as restrictions are lifted, but they may return if an outbreak happens where you live  It is important to know and follow all current social distancing rules in your area  The following are general guidelines:  · Stay home if you are sick or think you may have COVID-19  It is important to stay home if you are waiting for a testing appointment or for test results  Even if you do not have symptoms, you can pass the virus to others  · Limit trips out of your home  Plan your route so you make the fewest stops possible to limit contact  Keep track of places you go  This will help contact tracers notify others if you become infected  Wear a face covering while you are out  You will need to wear the covering on mass transit, such as a bus or the subway  You will also need to wear it while you travel on an airplane  · Stay at least 6 feet (2 meters) away from anyone who does not live in your home  Do not shake hands with, hug, or kiss a person as a greeting  Stand or walk as far from others as possible, especially around anyone who is sneezing or coughing  If you must use public transportation (such as a bus or subway), try to sit or stand away from others  Check in on anyone who lives alone  · Do not have anyone over to your home unless it is necessary  It is okay to have medical workers or other helpers in to provide care  Wear a face covering, and remind others to wear a mask or face covering  Remind them to wash their hands when they arrive and before they leave  Do not  have anyone over just to visit, even if you both do not feel sick  The virus can pass from one of you to the other before symptoms of COVID-19 begin  Some people never even develop symptoms   It is important that you continue social distancing with everyone, including children  It may be hard to tell a child not to hug or kiss you  Explain that this is how he or she can help you stay healthy  · Do not go to someone else's home unless it is necessary  Do not go over to visit, even if the person is lonely  Only go if you need to help him or her  · Avoid in-person gatherings and crowds  Gatherings or crowds of 10 or more individuals can cause the virus to spread  Avoid places such as [place], beaches, sporting events, and tourist attractions  For events such as parties, holiday meals, [employer] services, and conferences, attend virtually (on a computer), if possible  · Stay safe if you must go out to work  Keep physical distance between you and other workers as much as possible  Follow your employer's rules so everyone stays safe  Help strengthen your immune system:   · Ask about other vaccines you may need  Get the influenza (flu) vaccine as soon as recommended each year, usually starting in September or October  Get the pneumonia vaccine if recommended  Your healthcare provider can tell you if you should also get other vaccines, and when to get them  · Do not smoke  Nicotine and other chemicals in cigarettes and cigars can increase your risk for infection and for serious COVID-19 effects  Ask your healthcare provider for information if you currently smoke and need help to quit  E-cigarettes or smokeless tobacco still contain nicotine  Talk to your healthcare provider before you use these products  · Eat a variety of healthy foods  Examples include vegetables, fruits, whole-grain breads and cereals, lean meats and poultry, fish, low-fat dairy products, and cooked beans  Healthy foods contain nutrients that help keep your immune system strong  · Find ways to manage stress  You may be feeling more stressed than usual because of the COVID-19 outbreak   The situation is very stressful to many people  Talk to your healthcare providers about ways to manage stress during this time  Stress can lead to breathing problems or make the problems worse  Stress can trigger an attack or exacerbation of many health conditions  It is important to do things that help you feel more relaxed, such as the following:     ? Pick 1 or 2 times a day to watch the news  Constant news watching can increase your stress levels  ? Talk to a friend on the phone or through a video chat  ? Take a warm, soothing bath  ? Listen to music  ? Exercise can also help relieve stress  This may be hard if your regular gym or outdoor exercise area is closed  If you do not have exercise equipment at home, try walking inside your home  You can walk quickly or turn on music and dance  Follow up with your doctor or healthcare provider as directed: Your providers will tell you when you can come in for tests, procedures, or check-ups  Bring your symptom record with you to all appointments  Write down your questions so you remember to ask them during your visits  For more information:   · Centers for Disease Control and Prevention  [address] , [address]  Phone: [phone]  Phone: [phone]  Web Address: [employer]    © [address] [place] [address] 2021 Information is for End User's use only and may not be sold, redistributed or otherwise used for commercial purposes  All illustrations and images included in CareNotes® are the copyrighted property of [employer]  or Jennifer Coughlin   The above information is an  only  It is not intended as medical advice for individual conditions or treatments  Talk to your doctor, nurse or pharmacist before following any medical regimen to see if it is safe and effective for you

## 2022-02-08 ENCOUNTER — OFFICE VISIT (OUTPATIENT)
Dept: PULMONOLOGY | Facility: MEDICAL CENTER | Age: 77
End: 2022-02-08
Payer: MEDICARE

## 2022-02-08 VITALS
SYSTOLIC BLOOD PRESSURE: 110 MMHG | RESPIRATION RATE: 12 BRPM | HEART RATE: 82 BPM | TEMPERATURE: 97.8 F | OXYGEN SATURATION: 97 % | HEIGHT: 62 IN | DIASTOLIC BLOOD PRESSURE: 58 MMHG | BODY MASS INDEX: 31.47 KG/M2 | WEIGHT: 171 LBS

## 2022-02-08 DIAGNOSIS — I26.99 ACUTE PULMONARY EMBOLISM WITHOUT ACUTE COR PULMONALE, UNSPECIFIED PULMONARY EMBOLISM TYPE (HCC): ICD-10-CM

## 2022-02-08 DIAGNOSIS — J43.2 CENTRILOBULAR EMPHYSEMA (HCC): Primary | ICD-10-CM

## 2022-02-08 DIAGNOSIS — J96.11 CHRONIC RESPIRATORY FAILURE WITH HYPOXIA (HCC): ICD-10-CM

## 2022-02-08 PROCEDURE — 99214 OFFICE O/P EST MOD 30 MIN: CPT | Performed by: PHYSICIAN ASSISTANT

## 2022-02-08 NOTE — PROGRESS NOTES
Assessment/Plan:    Problem List Items Addressed This Visit        Respiratory    Centrilobular emphysema ([facility] ) - Primary    Relevant Orders    Complete PFT with post bronchodilator    Home Oxygen with Portability    Portable Concentrators       Cardiovascular and Mediastinum    Acute pulmonary embolism without acute cor pulmonale (HCC)    Relevant Medications    apixaban (Eliquis) 5 mg      Other Visit Diagnoses     Chronic respiratory failure with hypoxia (HCC)        Relevant Orders    Complete PFT with post bronchodilator    Home Oxygen with Portability    Portable Concentrators            Plan for today/next follow-up:    Acute Bilateral Pulmonary Emboli:  -continue Eliquis 1 Tab twice daily  -call office with any bleeding issues  -avoid risk of Trauma  Severe Emphysema  -Continue Trelegy Ellipta  -continue albuterol as neede for wheezing or shortness of breath  -get complete PFT in 2-3 months  -Call central scheduling at [phone] to schedule your follow up test   Chronic Hypoxemic Respriatory Failure  -Continue 3 L resting, walking, and night time  -she has portable tanks and would like POC  -has home concentrator    Follow up in 3 months    Return in about 3 months (around 5/8/2022)  All questions are answered to the patient's satisfaction and understanding  She verbalizes understanding  She is encouraged to call with any further questions or concerns  ______________________________________________________________________    Chief Complaint:   Chief Complaint   Patient presents with    Follow-up    Shortness of Breath       Patient ID: Ange Sheikh is a 68 y o  y o  female has a past medical history of Breast lump, CHF (congestive heart failure) ([facility] [address] ), COPD (chronic obstructive pulmonary disease) ([facility] [address] ), Diverticulitis, Emphysema of lung ([facility] [facility] ), History of chronic obstructive lung disease, Non-productive cough, and SOBOE (shortness of breath on exertion)      2/8/2022  Patient presents today for follow-up visit for:  Belen Spear    Was recently hosptalized w/ COVID - and acute on Chronic Hypoxemic Respiratory failure / Severe COPD / Emphysema and had B/L Pulmolnary Emboli w/ Mod - Severe COPD  Also had ANNA in hospital on CKD 3   Here in office is 84% on RA resting and desats to 74 RA amb  Improves to 89% w/ 3 L amb and to 97% on 3 L rest   She is and chronically has been on 02 therapy for approx 10 years since   These past couple of years the patient has remained a shut in due to [place] and not wanting exposure  She remains on Eliquis for PE provoked by COVID and discussed needing 3-6 months A/C  She is also following up with [facility] for ongoing treatment of R thigh cellulitis    Imaging Reviewed: CTA PE study 22:        CXR 22:      PFT:    PSG:  Smoking history:   Social History     Tobacco Use   Smoking Status Former Smoker    Packs/day: 1 50    Years: 55 00    Pack years: 82 50    Quit date: 2010    Years since quittin 6   Smokeless Tobacco Never Used   Tobacco Comment    ex cigarette smoker   **  Respiratory History: Severe Emphysema  Oxygen Therapy: chronic 3 L 02   DME Company: [employer]      Review of Systems   Constitutional: Negative for activity change, appetite change, chills, fatigue and fever  HENT: Negative for postnasal drip, rhinorrhea, sinus pressure and sinus pain  Eyes: Negative for visual disturbance  Respiratory: Positive for cough and shortness of breath  Negative for apnea, chest tightness, wheezing and stridor  Occasional cough   Cardiovascular: Negative for chest pain and leg swelling  Gastrointestinal: Negative for diarrhea, nausea and vomiting  Endocrine: Negative for cold intolerance and heat intolerance  Musculoskeletal: Negative for arthralgias, back pain, joint swelling and myalgias  Skin: Negative for color change  Neurological: Negative for syncope and light-headedness  Hematological: Negative for adenopathy  Psychiatric/Behavioral: Negative for confusion and sleep disturbance  The following portions of the patient's history were reviewed and updated as appropriate: allergies, current medications, past family history, past medical history, past social history, past surgical history and problem list     Smoking history: She reports that she quit smoking about 11 years ago  She has a 82 50 pack-year smoking history  She has never used smokeless tobacco   Social history: She reports that she quit smoking about 11 years ago  She has a 82 50 pack-year smoking history  She has never used smokeless tobacco  She reports previous alcohol use  She reports that she does not use drugs  Past Medical History:   Diagnosis Date    Breast lump     [facility]est[facility]) [facility])     COPD (chronic obstructive pulmonary disease) ([facility])     Diverticulitis     Emphysema of lung ([facility])     History of chronic obstructive lung disease     Non-productive cough     SOBOE (shortness of breath on exertion)      No past surgical history on file  Family History   Problem Relation Age of Onset    Stroke Mother     Diabetes Father     Heart disease Father     Emphysema Family     Stroke Family         syndrome    Diabetes Sister      Immunization History   Administered Date(s) Administered    Influenza Quadrivalent Preservative Free 3 years and older IM 03/25/2014    Influenza Split High Dose Preservative Free IM 12/06/2016    Influenza, high dose seasonal 0 7 mL 02/07/2022    Influenza, seasonal, injectable 12/17/2012    Pneumococcal Polysaccharide PPV23 12/17/2012    Tdap 12/17/2012     Current Outpatient Medications   Medication Sig Dispense Refill    albuterol (2 5 mg/3 mL) 0 083 % nebulizer solution USE 1 VIAL IN NEBULIZER 3 TIMES DAILY 90 vial 11    apixaban (Eliquis) 5 mg Take 2 tablets (10 mg total) by mouth 2 (two) times a day for 7 days, THEN 1 tablet (5 mg total) 2 (two) times a day   88 tablet 2    aspirin (ASPIR-LOW) 81 mg EC tablet Take 1 tablet by mouth daily      atorvastatin (LIPITOR) 40 mg tablet Take 1 tablet (40 mg total) by mouth daily 90 tablet 0    cephalexin (KEFLEX) 500 mg capsule Take 1 capsule (500 mg total) by mouth every 6 (six) hours for 7 days 28 capsule 0    DAILY MULTIPLE VITAMINS PO Take by mouth      fluticasone-umeclidinium-vilanterol (Trelegy Ellipta) 100-62 5-25 MCG/INH inhaler Rinse mouth after use  60 each 5    lisinopril (ZESTRIL) 5 mg tablet Take 1 tablet (5 mg total) by mouth daily 30 tablet 0    metoprolol tartrate (LOPRESSOR) 25 mg tablet Take 0 5 tablets (12 5 mg total) by mouth 2 (two) times a day 60 tablet 0    saccharomyces boulardii (FLORASTOR) 250 mg capsule Take 1 capsule (250 mg total) by mouth 2 (two) times a day for 10 days 20 capsule 0    albuterol (Ventolin HFA) 90 mcg/act inhaler Inhale 2 puffs 4 (four) times a day (Patient not taking: Reported on 2/8/2022 ) 18 g 2    Calcium 600 MG tablet Take by mouth (Patient not taking: Reported on 2/8/2022 )       No current facility-administered medications for this visit  Allergies: Patient has no known allergies  Objective:  Vitals:    02/08/22 1406   BP: 110/58   BP Location: Left arm   Patient Position: Sitting   Cuff Size: Standard   Pulse: 82   Resp: 12   Temp: 97 8 °F (36 6 °C)   TempSrc: Temporal   SpO2: 97%   Weight: 77 6 kg (171 lb)   Height: 5' 1 5" (1 562 m)   Oxygen Therapy  SpO2: 97 % (3L resting)    Wt Readings from Last 3 Encounters:   02/08/22 77 6 kg (171 lb)   02/07/22 77 9 kg (171 lb 12 8 oz)   01/23/22 78 1 kg (172 lb 2 9 oz)     Body mass index is 31 79 kg/m²  Physical Exam  Constitutional:       Appearance: She is well-developed  HENT:      Head: Normocephalic and atraumatic  Eyes:      Conjunctiva/sclera: Conjunctivae normal       Pupils: Pupils are equal, round, and reactive to light  Cardiovascular:      Rate and Rhythm: Normal rate and regular rhythm        Heart sounds: Normal heart sounds  Pulmonary:      Effort: Pulmonary effort is normal  No respiratory distress  Breath sounds: Normal breath sounds  No wheezing or rales  Comments: 95% 3 L NC   Chest:      Chest wall: No tenderness  Abdominal:      General: Bowel sounds are normal       Palpations: Abdomen is soft  Musculoskeletal:         General: Normal range of motion  Cervical back: Normal range of motion and neck supple  Skin:     General: Skin is warm and dry  Neurological:      Mental Status: She is alert and oriented to person, place, and time  Lab Review:   No results displayed because visit has over 200 results  Diagnostics:  No orders to display            ESS:    XR chest portable    Result Date: 1/23/2022  Narrative: CHEST INDICATION:   dyspnea  Patient has confirmed COVID-19  Positive on 1/22/2022  COMPARISON:  Chest radiograph from 12/27/2018 and chest CT from 6/3/2019  EXAM PERFORMED/VIEWS:  XR CHEST PORTABLE FINDINGS: Cardiomediastinal silhouette appears unremarkable  Mild bilateral ground glass opacity  No effusion or pneumothorax Osseous structures appear within normal limits for patient age  Impression: Mild bilateral ground glass opacity due to Covid-19  Workstation performed: [id number]     CTA chest pe study    Result Date: 1/23/2022  Narrative: CTA - CHEST WITH IV CONTRAST - PULMONARY ANGIOGRAM INDICATION:   Pulmonary embolism (PE) suspected, positive D-dimer COVID, DDIMER, R/O PE  COMPARISON: None  TECHNIQUE: CTA examination of the chest was performed using angiographic technique according to a protocol specifically tailored to evaluate for pulmonary embolism  Axial, sagittal, and coronal 2D reformatted images were created from the source data and  submitted for interpretation  In addition, coronal 3D MIP postprocessing was performed on the acquisition scanner  Radiation dose length product (DLP) for this visit:  510 mGy-cm     This examination, like all CT scans performed in the [facility], was performed utilizing techniques to minimize radiation dose exposure, including the use of iterative reconstruction and automated exposure control  IV Contrast:  100 mL of iohexol (OMNIPAQUE)  FINDINGS: PULMONARY ARTERIAL TREE:  Filling defect within several segmental and subsegmental pulmonary artery branches supplying the right and left lower lobes  Marion Simple LUNGS:  Emphysematous changes  Passive right lower lobe atelectasis  There is no tracheal or endobronchial lesion  PLEURA:  Small right pleural effusion  HEART/GREAT VESSELS:  Unremarkable for patient's age  MEDIASTINUM AND NEREYDA:  Unremarkable  CHEST WALL AND LOWER NECK:   Unremarkable  VISUALIZED STRUCTURES IN THE UPPER ABDOMEN:  Unremarkable  OSSEOUS STRUCTURES:  No acute fracture or destructive osseous lesion  Impression: Filling defects within segmental and subsegmental pulmonary branches supplying the bilateral lower lobes consistent acute pulmonary embolus  Small right pleural effusion Findings discussed with Dr Sherryle Iron at 1130 pm, 1/23/21 Workstation performed: [id number]           Portions of the record may have been created with voice recognition software  Occasional wrong word or "sound a like" substitutions may have occurred due to the inherent limitations of voice recognition software  Read the chart carefully and recognize, using context, where substitutions have occurred      Electronically Signed by Corin Daugherty PA-C

## 2022-02-08 NOTE — PATIENT INSTRUCTIONS
Plan for today/next follow-up:    Acute Bilateral Pulmonary Emboli:  -continue Eliquis 1 Tab twice daily  -call office with any bleeding issues  -avoid risk of Trauma  Severe Emphysema  -Continue Trelegy Ellipta  -continue albuterol as neede for wheezing or shortness of breath  -get complete PFT in 2-3 months  -Call central scheduling at [phone] to schedule your follow up test   Chronic Hypoxemic Respriatory Failure  -Continue 3 L resting, walking, and night time    Follow up in 3 months

## 2022-02-11 ENCOUNTER — DOCUMENTATION (OUTPATIENT)
Dept: PULMONOLOGY | Facility: MEDICAL CENTER | Age: 77
End: 2022-02-11

## 2022-02-24 ENCOUNTER — RA CDI HCC (OUTPATIENT)
Dept: OTHER | Facility: HOSPITAL | Age: 77
End: 2022-02-24

## 2022-02-24 NOTE — PROGRESS NOTES
[facility]  coding opportunities       Chart reviewed, no opportunity found: CHART REVIEWED, NO OPPORTUNITY FOUND                        Patients insurance company: [employer]

## 2022-03-01 DIAGNOSIS — I10 ESSENTIAL HYPERTENSION: ICD-10-CM

## 2022-03-01 RX ORDER — LISINOPRIL 5 MG/1
5 TABLET ORAL DAILY
Qty: 30 TABLET | Refills: 0 | Status: SHIPPED | OUTPATIENT
Start: 2022-03-01 | End: 2022-03-28

## 2022-03-02 ENCOUNTER — OFFICE VISIT (OUTPATIENT)
Dept: FAMILY MEDICINE CLINIC | Facility: CLINIC | Age: 77
End: 2022-03-02
Payer: MEDICARE

## 2022-03-02 VITALS
SYSTOLIC BLOOD PRESSURE: 152 MMHG | HEART RATE: 73 BPM | TEMPERATURE: 99 F | RESPIRATION RATE: 22 BRPM | BODY MASS INDEX: 30.91 KG/M2 | WEIGHT: 168 LBS | OXYGEN SATURATION: 94 % | DIASTOLIC BLOOD PRESSURE: 78 MMHG | HEIGHT: 62 IN

## 2022-03-02 DIAGNOSIS — Z00.00 MEDICARE ANNUAL WELLNESS VISIT, INITIAL: Primary | ICD-10-CM

## 2022-03-02 DIAGNOSIS — Z12.31 ENCOUNTER FOR SCREENING MAMMOGRAM FOR BREAST CANCER: ICD-10-CM

## 2022-03-02 DIAGNOSIS — M81.0 POSTMENOPAUSAL BONE LOSS: ICD-10-CM

## 2022-03-02 DIAGNOSIS — J43.2 CENTRILOBULAR EMPHYSEMA (HCC): ICD-10-CM

## 2022-03-02 DIAGNOSIS — Z13.820 SCREENING FOR OSTEOPOROSIS: ICD-10-CM

## 2022-03-02 DIAGNOSIS — Z11.59 NEED FOR HEPATITIS C SCREENING TEST: ICD-10-CM

## 2022-03-02 PROCEDURE — G0438 PPPS, INITIAL VISIT: HCPCS | Performed by: FAMILY MEDICINE

## 2022-03-02 NOTE — PROGRESS NOTES
Assessment and Plan:     Problem List Items Addressed This Visit        Respiratory    Centrilobular emphysema ([address] )      Other Visit Diagnoses     Medicare annual wellness visit, initial    -  Primary    Need for hepatitis C screening test        Encounter for screening mammogram for breast cancer        Screening for osteoporosis        Postmenopausal bone loss        Relevant Orders    DXA bone density spine hip and pelvis      Patient advised to follow-up with pulmonology regarding centrilobular emphysema  Patient currently on 3 L during the day and 4-6 L at night  Patient advised to return to PCP for follow-up if she cannot get in to see pulmonology  Patient handed 5 wishes packet to complete and sign advanced directive  Patient verbalized understanding above plan  Patient given DEXA order however patient will think about it  BMI Counseling: Body mass index is 31 23 kg/m²  The BMI is above normal  Nutrition recommendations include decreasing portion sizes, limiting drinks that contain sugar and reducing intake of cholesterol  Exercise recommendations include exercising 3-5 times per week  No pharmacotherapy was ordered  Rationale for BMI follow-up plan is due to patient being overweight or obese  Depression Screening and Follow-up Plan: Patient was screened for depression during today's encounter  They screened negative with a PHQ-2 score of 1  Preventive health issues were discussed with patient, and age appropriate screening tests were ordered as noted in patient's After Visit Summary  Personalized health advice and appropriate referrals for health education or preventive services given if needed, as noted in patient's After Visit Summary       History of Present Illness:     Patient presents for Medicare Annual Wellness visit    Patient Care Team:  Bobby Barker DO as PCP - General (Family Medicine)  Austin Ayala MD     Problem List:     Patient Active Problem List   Diagnosis    [facility]rilobular [facility]ma ([facility] )    Essential hypertension    CAD (coronary artery disease)    Mixed hyperlipidemia    Stage 3 chronic kidney disease ([facility] )    Former smoker    COVID-19 vaccination declined    Acute pulmonary embolism without acute cor pulmonale (HCC)      Past Medical and Surgical History:     Past Medical History:   Diagnosis Date    Breast lump     CHF (congestive heart failure) (HCC)     COPD (chronic obstructive pulmonary disease) (HCC)     Diverticulitis     [facility] lung ([facility])     History of chronic obstructive lung disease     Non-productive cough     SOBOE (shortness of breath on exertion)      History reviewed  No pertinent surgical history  Family History:     Family History   Problem Relation Age of Onset   Lottie Garcia Stroke Mother     Diabetes Father     Heart disease Father     Emphysema Family     Stroke Family         syndrome    Diabetes Sister       Social History:     Social History     Socioeconomic History    Marital status:       Spouse name: None    Number of children: None    Years of education: None    Highest education level: None   Occupational History    None   Tobacco Use    Smoking status: Former Smoker     Packs/day: 1 50     Years: 55 00     Pack years: 82 50     Quit date: 2010     Years since quittin 7    Smokeless tobacco: Never Used    Tobacco comment: ex cigarette smoker   Vaping Use    Vaping Use: Never used   Substance and Sexual Activity    Alcohol use: Not Currently    Drug use: No    Sexual activity: Not Currently   Other Topics Concern    None   Social History Narrative    None     Social Determinants of Health     Financial Resource Strain: Not on file   Food Insecurity: Not on file   Transportation Needs: Not on file   Physical Activity: Not on file   Stress: Not on file   Social Connections: Not on file   Intimate Partner Violence: Not on file   Housing Stability: Not on file      Medications and Allergies: Current Outpatient Medications   Medication Sig Dispense Refill    albuterol (2 5 mg/3 mL) 0 083 % nebulizer solution USE 1 VIAL IN NEBULIZER 3 TIMES DAILY 90 vial 11    albuterol (Ventolin HFA) 90 mcg/act inhaler Inhale 2 puffs 4 (four) times a day 18 g 2    apixaban (Eliquis) 5 mg Take 1 tablet (5 mg total) by mouth 2 (two) times a day 60 tablet 2    aspirin (ASPIR-LOW) 81 mg EC tablet Take 1 tablet by mouth daily      atorvastatin (LIPITOR) 40 mg tablet Take 1 tablet (40 mg total) by mouth daily 90 tablet 0    DAILY MULTIPLE VITAMINS PO Take by mouth      fluticasone-umeclidinium-vilanterol (Trelegy Ellipta) 100-62 5-25 MCG/INH inhaler Rinse mouth after use  60 each 5    lisinopril (ZESTRIL) 5 mg tablet Take 1 tablet (5 mg total) by mouth daily 30 tablet 0    metoprolol tartrate (LOPRESSOR) 25 mg tablet Take 0 5 tablets (12 5 mg total) by mouth 2 (two) times a day 60 tablet 0    Calcium 600 MG tablet Take by mouth (Patient not taking: Reported on 2/8/2022 )       No current facility-administered medications for this visit  No Known Allergies   Immunizations:     Immunization History   Administered Date(s) Administered    Influenza Quadrivalent Preservative Free 3 years and older IM 03/25/2014    Influenza Split High Dose Preservative Free IM 12/06/2016    Influenza, high dose seasonal 0 7 mL 02/07/2022    Influenza, seasonal, injectable 12/17/2012    Pneumococcal Polysaccharide PPV23 12/17/2012    Tdap 12/17/2012      Health Maintenance:         Topic Date Due    Hepatitis C Screening  Never done    Breast Cancer Screening: Mammogram  11/19/2019    Lung Cancer Screening  01/23/2023     There are no preventive care reminders to display for this patient     Medicare Health Risk Assessment:     /78 (BP Location: Left arm, Patient Position: Sitting, Cuff Size: Adult)   Pulse 73   Temp 99 °F (37 2 °C) (Tympanic)   Resp 22   Ht 5' 1 5" (1 562 m)   Wt 76 2 kg (168 lb)   SpO2 94% Comment: on 02 @ 3lpm via n/c  BMI 31 23 kg/m²      Linette Lopez is here for her Subsequent Wellness visit  Health Risk Assessment:   Patient rates overall health as poor  Patient feels that their physical health rating is slightly worse  Patient is satisfied with their life  Eyesight was rated as same  Hearing was rated as same  Patient feels that their emotional and mental health rating is same  Patients states they are never, rarely angry  Patient states they are sometimes unusually tired/fatigued  Pain experienced in the last 7 days has been none  Patient states that she has experienced no weight loss or gain in last 6 months  Depression Screening:   PHQ-2 Score: 1      Fall Risk Screening: In the past year, patient has experienced: no history of falling in past year      Urinary Incontinence Screening:   Patient has not leaked urine accidently in the last six months  Home Safety:  Patient does not have trouble with stairs inside or outside of their home  Patient has working smoke alarms and has working carbon monoxide detector  Home safety hazards include: none  Nutrition:   Current diet is Regular  Medications:   Patient is not currently taking any over-the-counter supplements  Patient is able to manage medications  Activities of Daily Living (ADLs)/Instrumental Activities of Daily Living (IADLs):   Walk and transfer into and out of bed and chair?: Yes  Dress and groom yourself?: Yes    Bathe or shower yourself?: Yes    Feed yourself?  Yes  Do your laundry/housekeeping?: Yes  Manage your money, pay your bills and track your expenses?: Yes  Make your own meals?: No    Do your own shopping?: No    ADL comments: Lives with daughter who provides some assistance with means and house work    Durable Medical Equipment Suppliers  oxygen, concentrator, nebulizer    Previous Hospitalizations:   Any hospitalizations or ED visits within the last 12 months?: Yes    How many hospitalizations have you had in the last year?: 1-2    Advance Care Planning:   Living will: No    Durable POA for healthcare: No    Advanced directive: No      PREVENTIVE SCREENINGS      Cardiovascular Screening:    General: Screening Not Indicated and History Lipid Disorder      Diabetes Screening:     General: Screening Current      Cervical Cancer Screening:    General: Screening Not Indicated      Lung Cancer Screening:     General: Screening Current    Screening, Brief Intervention, and Referral to Treatment (SBIRT)    Screening  Typical number of drinks in a day: 0  Typical number of drinks in a week: 0  Interpretation: Low risk drinking behavior      Single Item Drug Screening:  How often have you used an illegal drug (including marijuana) or a prescription medication for non-medical reasons in the past year? never    Single Item Drug Screen Score: 0  Interpretation: Negative screen for possible drug use disorder      Cornelius Aase, DO

## 2022-03-02 NOTE — PATIENT INSTRUCTIONS
Medicare Preventive Visit Patient Instructions  Thank you for completing your Welcome to Medicare Visit or Medicare Annual Wellness Visit today  Your next wellness visit will be due in one year (3/3/2023)  The screening/preventive services that you may require over the next 5-10 years are detailed below  Some tests may not apply to you based off risk factors and/or age  Screening tests ordered at today's visit but not completed yet may show as past due  Also, please note that scanned in results may not display below  Preventive Screenings:  Service Recommendations Previous Testing/Comments   Colorectal Cancer Screening  * Colonoscopy    * Fecal Occult Blood Test (FOBT)/Fecal Immunochemical Test (FIT)  * Fecal DNA/Cologuard Test  * Flexible Sigmoidoscopy Age: 54-65 years old   Colonoscopy: every 10 years (may be performed more frequently if at higher risk)  OR  FOBT/FIT: every 1 year  OR  Cologuard: every 3 years  OR  Sigmoidoscopy: every 5 years  Screening may be recommended earlier than age 48 if at higher risk for colorectal cancer  Also, an individualized decision between you and your healthcare provider will decide whether screening between the ages of 74-80 would be appropriate  Colonoscopy: Not on file  FOBT/FIT: Not on file  Cologuard: Not on file  Sigmoidoscopy: Not on file          Breast Cancer Screening Age: 36 years old  Frequency: every 1-2 years  Not required if history of left and right mastectomy Mammogram: 11/19/2018        Cervical Cancer Screening Between the ages of 21-29, pap smear recommended once every 3 years  Between the ages of 33-67, can perform pap smear with HPV co-testing every 5 years     Recommendations may differ for women with a history of total hysterectomy, cervical cancer, or abnormal pap smears in past  Pap Smear: Not on file    Screening Not Indicated   Hepatitis C Screening Once for adults born between [facility]  More frequently in patients at high risk for Hepatitis C Hep C Antibody: Not on file        Diabetes Screening 1-2 times per year if you're at risk for diabetes or have pre-diabetes Fasting glucose: 98 mg/dL   A1C: No results in last 5 years    Screening Current   Cholesterol Screening Once every 5 years if you don't have a lipid disorder  May order more often based on risk factors  Lipid panel: 10/25/2019    Screening Not Indicated  History Lipid Disorder     Other Preventive Screenings Covered by Medicare:  1  Abdominal Aortic Aneurysm (AAA) Screening: covered once if your at risk  You're considered to be at risk if you have a family history of AAA  2  Lung Cancer Screening: covers low dose CT scan once per year if you meet all of the following conditions: (1) Age 50-69; (2) No signs or symptoms of lung cancer; (3) Current smoker or have quit smoking within the last 15 years; (4) You have a tobacco smoking history of at least 30 pack years (packs per day multiplied by number of years you smoked); (5) You get a written order from a healthcare provider  3  Glaucoma Screening: covered annually if you're considered high risk: (1) You have diabetes OR (2) Family history of glaucoma OR (3)  aged 48 and older OR (3)  American aged 72 and older  3  Osteoporosis Screening: covered every 2 years if you meet one of the following conditions: (1) You're estrogen deficient and at risk for osteoporosis based off medical history and other findings; (2) Have a vertebral abnormality; (3) On glucocorticoid therapy for more than 3 months; (4) Have primary hyperparathyroidism; (5) On osteoporosis medications and need to assess response to drug therapy  · Last bone density test (DXA Scan): Not on file  5  HIV Screening: covered annually if you're between the age of 12-76  Also covered annually if you are younger than 13 and older than 72 with risk factors for HIV infection   For pregnant patients, it is covered up to 3 times per pregnancy  Immunizations:  Immunization Recommendations   Influenza Vaccine Annual influenza vaccination during flu season is recommended for all persons aged >= 6 months who do not have contraindications   Pneumococcal Vaccine (Prevnar and Pneumovax)  * Prevnar = PCV13  * Pneumovax = PPSV23   Adults 25-60 years old: 1-3 doses may be recommended based on certain risk factors  Adults 72 years old: Prevnar (PCV13) vaccine recommended followed by Pneumovax (PPSV23) vaccine  If already received PPSV23 since turning 65, then PCV13 recommended at least one year after PPSV23 dose  Hepatitis B Vaccine 3 dose series if at intermediate or high risk (ex: diabetes, end stage renal disease, liver disease)   Tetanus (Td) Vaccine - COST NOT COVERED BY MEDICARE PART B Following completion of primary series, a booster dose should be given every 10 years to maintain immunity against tetanus  Td may also be given as tetanus wound prophylaxis  Tdap Vaccine - COST NOT COVERED BY MEDICARE PART B Recommended at least once for all adults  For pregnant patients, recommended with each pregnancy  Shingles Vaccine (Shingrix) - COST NOT COVERED BY MEDICARE PART B  2 shot series recommended in those aged 48 and above     Health Maintenance Due:      Topic Date Due    Hepatitis C Screening  Never done    Breast Cancer Screening: Mammogram  11/19/2019    Lung Cancer Screening  01/23/2023     Immunizations Due:  There are no preventive care reminders to display for this patient  Advance Directives   What are advance directives? Advance directives are legal documents that state your wishes and plans for medical care  These plans are made ahead of time in case you lose your ability to make decisions for yourself  Advance directives can apply to any medical decision, such as the treatments you want, and if you want to donate organs  What are the types of advance directives?   There are many types of advance directives, and each state has rules about how to use them  You may choose a combination of any of the following:  · Living will: This is a written record of the treatment you want  You can also choose which treatments you do not want, which to limit, and which to stop at a certain time  This includes surgery, medicine, IV fluid, and tube feedings  · Durable power of  for healthcare [facility]): This is a written record that states who you want to make healthcare choices for you when you are unable to make them for yourself  This person, called a proxy, is usually a family member or a friend  You may choose more than 1 proxy  · Do not resuscitate (DNR) order:  A DNR order is used in case your heart stops beating or you stop breathing  It is a request not to have certain forms of treatment, such as CPR  A DNR order may be included in other types of advance directives  · Medical directive: This covers the care that you want if you are in a coma, near death, or unable to make decisions for yourself  You can list the treatments you want for each condition  Treatment may include pain medicine, surgery, blood transfusions, dialysis, IV or tube feedings, and a ventilator (breathing machine)  · Values history: This document has questions about your views, beliefs, and how you feel and think about life  This information can help others choose the care that you would choose  Why are advance directives important? An advance directive helps you control your care  Although spoken wishes may be used, it is better to have your wishes written down  Spoken wishes can be misunderstood, or not followed  Treatments may be given even if you do not want them  An advance directive may make it easier for your family to make difficult choices about your care     Weight Management   Why it is important to manage your weight:  Being overweight increases your risk of health conditions such as heart disease, high blood pressure, type 2 diabetes, and certain types of cancer  It can also increase your risk for osteoarthritis, sleep apnea, and other respiratory problems  Aim for a slow, steady weight loss  Even a small amount of weight loss can lower your risk of health problems  How to lose weight safely:  A safe and healthy way to lose weight is to eat fewer calories and get regular exercise  You can lose up about 1 pound a week by decreasing the number of calories you eat by 500 calories each day  Healthy meal plan for weight management:  A healthy meal plan includes a variety of foods, contains fewer calories, and helps you stay healthy  A healthy meal plan includes the following:  · Eat whole-grain foods more often  A healthy meal plan should contain fiber  Fiber is the part of grains, fruits, and vegetables that is not broken down by your body  Whole-grain foods are healthy and provide extra fiber in your diet  Some examples of whole-grain foods are whole-wheat breads and pastas, oatmeal, brown rice, and bulgur  · Eat a variety of vegetables every day  Include dark, leafy greens such as spinach, kale, raf greens, and mustard greens  Eat yellow and orange vegetables such as carrots, sweet potatoes, and winter squash  · Eat a variety of fruits every day  Choose fresh or canned fruit (canned in its own juice or light syrup) instead of juice  Fruit juice has very little or no fiber  · Eat low-fat dairy foods  Drink fat-free (skim) milk or 1% milk  Eat fat-free yogurt and low-fat cottage cheese  Try low-fat cheeses such as mozzarella and other reduced-fat cheeses  · Choose meat and other protein foods that are low in fat  Choose beans or other legumes such as split peas or lentils  Choose fish, skinless poultry (chicken or turkey), or lean cuts of red meat (beef or pork)  Before you cook meat or poultry, cut off any visible fat  · Use less fat and oil  Try baking foods instead of frying them   Add less fat, such as margarine, sour cream, regular salad dressing and mayonnaise to foods  Eat fewer high-fat foods  Some examples of high-fat foods include french fries, doughnuts, ice cream, and cakes  · Eat fewer sweets  Limit foods and drinks that are high in sugar  This includes candy, cookies, regular soda, and sweetened drinks  Exercise:  Exercise at least 30 minutes per day on most days of the week  Some examples of exercise include walking, biking, dancing, and swimming  You can also fit in more physical activity by taking the stairs instead of the elevator or parking farther away from stores  Ask your healthcare provider about the best exercise plan for you  © Copyright [employer] 2018 Information is for End User's use only and may not be sold, redistributed or otherwise used for commercial purposes  All illustrations and images included in CareNotes® are the copyrighted property of [employer]  or [facility] Preventive Visit Patient Instructions  Thank you for completing your Welcome to Medicare Visit or Medicare Annual Wellness Visit today  Your next wellness visit will be due in one year (3/3/2023)  The screening/preventive services that you may require over the next 5-10 years are detailed below  Some tests may not apply to you based off risk factors and/or age  Screening tests ordered at today's visit but not completed yet may show as past due  Also, please note that scanned in results may not display below    Preventive Screenings:  Service Recommendations Previous Testing/Comments   Colorectal Cancer Screening  * Colonoscopy    * Fecal Occult Blood Test (FOBT)/Fecal Immunochemical Test (FIT)  * Fecal DNA/Cologuard Test  * Flexible Sigmoidoscopy Age: 54-65 years old   Colonoscopy: every 10 years (may be performed more frequently if at higher risk)  OR  FOBT/FIT: every 1 year  OR  Cologuard: every 3 years  OR  Sigmoidoscopy: every 5 years  Screening may be recommended earlier than age 48 if at higher risk for colorectal cancer  Also, an individualized decision between you and your healthcare provider will decide whether screening between the ages of 74-80 would be appropriate  Colonoscopy: Not on file  FOBT/FIT: Not on file  Cologuard: Not on file  Sigmoidoscopy: Not on file          Breast Cancer Screening Age: 36 years old  Frequency: every 1-2 years  Not required if history of left and right mastectomy Mammogram: 11/19/2018        Cervical Cancer Screening Between the ages of 21-29, pap smear recommended once every 3 years  Between the ages of 33-67, can perform pap smear with HPV co-testing every 5 years  Recommendations may differ for women with a history of total hysterectomy, cervical cancer, or abnormal pap smears in past  Pap Smear: Not on file    Screening Not Indicated   Hepatitis C Screening Once for adults born between 1945 and 1965  More frequently in patients at high risk for Hepatitis C Hep C Antibody: Not on file        Diabetes Screening 1-2 times per year if you're at risk for diabetes or have pre-diabetes Fasting glucose: 98 mg/dL   A1C: No results in last 5 years    Screening Current   Cholesterol Screening Once every 5 years if you don't have a lipid disorder  May order more often based on risk factors  Lipid panel: 10/25/2019    Screening Not Indicated  History Lipid Disorder     Other Preventive Screenings Covered by Medicare:  6  Abdominal Aortic Aneurysm (AAA) Screening: covered once if your at risk  You're considered to be at risk if you have a family history of AAA  7  Lung Cancer Screening: covers low dose CT scan once per year if you meet all of the following conditions: (1) Age 50-69; (2) No signs or symptoms of lung cancer; (3) Current smoker or have quit smoking within the last 15 years; (4) You have a tobacco smoking history of at least 30 pack years (packs per day multiplied by number of years you smoked); (5) You get a written order from a healthcare provider    8  Glaucoma Screening: covered annually if you're considered high risk: (1) You have diabetes OR (2) Family history of glaucoma OR (3)  aged 48 and older OR (3)  American aged 72 and older  5  Osteoporosis Screening: covered every 2 years if you meet one of the following conditions: (1) You're estrogen deficient and at risk for osteoporosis based off medical history and other findings; (2) Have a vertebral abnormality; (3) On glucocorticoid therapy for more than 3 months; (4) Have primary hyperparathyroidism; (5) On osteoporosis medications and need to assess response to drug therapy  · Last bone density test (DXA Scan): Not on file  10  HIV Screening: covered annually if you're between the age of 12-76  Also covered annually if you are younger than 13 and older than 72 with risk factors for HIV infection  For pregnant patients, it is covered up to 3 times per pregnancy  Immunizations:  Immunization Recommendations   Influenza Vaccine Annual influenza vaccination during flu season is recommended for all persons aged >= 6 months who do not have contraindications   Pneumococcal Vaccine (Prevnar and Pneumovax)  * Prevnar = PCV13  * Pneumovax = PPSV23   Adults 25-60 years old: 1-3 doses may be recommended based on certain risk factors  Adults 72 years old: Prevnar (PCV13) vaccine recommended followed by Pneumovax (PPSV23) vaccine  If already received PPSV23 since turning 65, then PCV13 recommended at least one year after PPSV23 dose  Hepatitis B Vaccine 3 dose series if at intermediate or high risk (ex: diabetes, end stage renal disease, liver disease)   Tetanus (Td) Vaccine - COST NOT COVERED BY MEDICARE PART B Following completion of primary series, a booster dose should be given every 10 years to maintain immunity against tetanus  Td may also be given as tetanus wound prophylaxis  Tdap Vaccine - COST NOT COVERED BY MEDICARE PART B Recommended at least once for all adults   For pregnant patients, recommended with each pregnancy  Shingles Vaccine (Shingrix) - COST NOT COVERED BY MEDICARE PART B  2 shot series recommended in those aged 48 and above     Health Maintenance Due:      Topic Date Due    Hepatitis C Screening  Never done    Breast Cancer Screening: Mammogram  11/19/2019    Lung Cancer Screening  01/23/2023     Immunizations Due:  There are no preventive care reminders to display for this patient  Advance Directives   What are advance directives? Advance directives are legal documents that state your wishes and plans for medical care  These plans are made ahead of time in case you lose your ability to make decisions for yourself  Advance directives can apply to any medical decision, such as the treatments you want, and if you want to donate organs  What are the types of advance directives? There are many types of advance directives, and each state has rules about how to use them  You may choose a combination of any of the following:  · Living will: This is a written record of the treatment you want  You can also choose which treatments you do not want, which to limit, and which to stop at a certain time  This includes surgery, medicine, IV fluid, and tube feedings  · Durable power of  for healthcare [facility]): This is a written record that states who you want to make healthcare choices for you when you are unable to make them for yourself  This person, called a proxy, is usually a family member or a friend  You may choose more than 1 proxy  · Do not resuscitate (DNR) order:  A DNR order is used in case your heart stops beating or you stop breathing  It is a request not to have certain forms of treatment, such as CPR  A DNR order may be included in other types of advance directives  · Medical directive: This covers the care that you want if you are in a coma, near death, or unable to make decisions for yourself  You can list the treatments you want for each condition   Treatment may include pain medicine, surgery, blood transfusions, dialysis, IV or tube feedings, and a ventilator (breathing machine)  · Values history: This document has questions about your views, beliefs, and how you feel and think about life  This information can help others choose the care that you would choose  Why are advance directives important? An advance directive helps you control your care  Although spoken wishes may be used, it is better to have your wishes written down  Spoken wishes can be misunderstood, or not followed  Treatments may be given even if you do not want them  An advance directive may make it easier for your family to make difficult choices about your care  Weight Management   Why it is important to manage your weight:  Being overweight increases your risk of health conditions such as heart disease, high blood pressure, type 2 diabetes, and certain types of cancer  It can also increase your risk for osteoarthritis, sleep apnea, and other respiratory problems  Aim for a slow, steady weight loss  Even a small amount of weight loss can lower your risk of health problems  How to lose weight safely:  A safe and healthy way to lose weight is to eat fewer calories and get regular exercise  You can lose up about 1 pound a week by decreasing the number of calories you eat by 500 calories each day  Healthy meal plan for weight management:  A healthy meal plan includes a variety of foods, contains fewer calories, and helps you stay healthy  A healthy meal plan includes the following:  · Eat whole-grain foods more often  A healthy meal plan should contain fiber  Fiber is the part of grains, fruits, and vegetables that is not broken down by your body  Whole-grain foods are healthy and provide extra fiber in your diet  Some examples of whole-grain foods are whole-wheat breads and pastas, oatmeal, brown rice, and bulgur  · Eat a variety of vegetables every day    Include dark, leafy greens such as spinach, kale, raf greens, and mustard greens  Eat yellow and orange vegetables such as carrots, sweet potatoes, and winter squash  · Eat a variety of fruits every day  Choose fresh or canned fruit (canned in its own juice or light syrup) instead of juice  Fruit juice has very little or no fiber  · Eat low-fat dairy foods  Drink fat-free (skim) milk or 1% milk  Eat fat-free yogurt and low-fat cottage cheese  Try low-fat cheeses such as mozzarella and other reduced-fat cheeses  · Choose meat and other protein foods that are low in fat  Choose beans or other legumes such as split peas or lentils  Choose fish, skinless poultry (chicken or turkey), or lean cuts of red meat (beef or pork)  Before you cook meat or poultry, cut off any visible fat  · Use less fat and oil  Try baking foods instead of frying them  Add less fat, such as margarine, sour cream, regular salad dressing and mayonnaise to foods  Eat fewer high-fat foods  Some examples of high-fat foods include french fries, doughnuts, ice cream, and cakes  · Eat fewer sweets  Limit foods and drinks that are high in sugar  This includes candy, cookies, regular soda, and sweetened drinks  Exercise:  Exercise at least 30 minutes per day on most days of the week  Some examples of exercise include walking, biking, dancing, and swimming  You can also fit in more physical activity by taking the stairs instead of the elevator or parking farther away from stores  Ask your healthcare provider about the best exercise plan for you  © Copyright [address] 2018 Information is for End User's use only and may not be sold, redistributed or otherwise used for commercial purposes   All illustrations and images included in CareNotes® are the copyrighted property of [employer]  or [address] [place] [address]

## 2022-03-28 DIAGNOSIS — I10 ESSENTIAL HYPERTENSION: ICD-10-CM

## 2022-03-28 RX ORDER — LISINOPRIL 5 MG/1
TABLET ORAL
Qty: 30 TABLET | Refills: 0 | Status: SHIPPED | OUTPATIENT
Start: 2022-03-28 | End: 2022-05-06 | Stop reason: SDUPTHER

## 2022-04-04 ENCOUNTER — APPOINTMENT (OUTPATIENT)
Dept: LAB | Facility: HOSPITAL | Age: 77
End: 2022-04-04
Payer: MEDICARE

## 2022-04-04 DIAGNOSIS — I25.119 CORONARY ARTERY DISEASE INVOLVING NATIVE HEART WITH ANGINA PECTORIS, UNSPECIFIED VESSEL OR LESION TYPE (HCC): ICD-10-CM

## 2022-04-04 DIAGNOSIS — Z11.59 NEED FOR HEPATITIS C SCREENING TEST: ICD-10-CM

## 2022-04-04 LAB
CHOLEST SERPL-MCNC: 194 MG/DL
HCV AB SER QL: NORMAL
HDLC SERPL-MCNC: 55 MG/DL
LDLC SERPL CALC-MCNC: 115 MG/DL (ref 0–100)
TRIGL SERPL-MCNC: 122 MG/DL

## 2022-04-04 PROCEDURE — 36415 COLL VENOUS BLD VENIPUNCTURE: CPT

## 2022-04-04 PROCEDURE — 80061 LIPID PANEL: CPT

## 2022-04-04 PROCEDURE — 86803 HEPATITIS C AB TEST: CPT

## 2022-04-18 ENCOUNTER — APPOINTMENT (EMERGENCY)
Dept: RADIOLOGY | Facility: HOSPITAL | Age: 77
DRG: 872 | End: 2022-04-18
Payer: MEDICARE

## 2022-04-18 ENCOUNTER — HOSPITAL ENCOUNTER (INPATIENT)
Facility: HOSPITAL | Age: 77
LOS: 4 days | Discharge: HOME | DRG: 872 | End: 2022-04-22
Attending: EMERGENCY MEDICINE | Admitting: FAMILY MEDICINE
Payer: MEDICARE

## 2022-04-18 DIAGNOSIS — J43.2 CENTRILOBULAR EMPHYSEMA (HCC): ICD-10-CM

## 2022-04-18 DIAGNOSIS — A41.9 SEPSIS (HCC): Primary | ICD-10-CM

## 2022-04-18 DIAGNOSIS — D72.829 LEUKOCYTOSIS: ICD-10-CM

## 2022-04-18 DIAGNOSIS — I25.10 CAD (CORONARY ARTERY DISEASE): ICD-10-CM

## 2022-04-18 DIAGNOSIS — I10 ESSENTIAL HYPERTENSION: ICD-10-CM

## 2022-04-18 DIAGNOSIS — L03.116 CELLULITIS OF LEFT LOWER EXTREMITY: ICD-10-CM

## 2022-04-18 PROBLEM — Z86.711 HISTORY OF PULMONARY EMBOLUS (PE): Status: ACTIVE | Noted: 2022-01-24

## 2022-04-18 LAB
ALBUMIN SERPL BCP-MCNC: 3 G/DL (ref 3.5–5)
ALP SERPL-CCNC: 84 U/L (ref 46–116)
ALT SERPL W P-5'-P-CCNC: 25 U/L (ref 12–78)
ANION GAP SERPL CALCULATED.3IONS-SCNC: 6 MMOL/L (ref 4–13)
AST SERPL W P-5'-P-CCNC: 23 U/L (ref 5–45)
ATRIAL RATE: 70 BPM
BACTERIA UR QL AUTO: ABNORMAL /HPF
BASOPHILS # BLD AUTO: 0.33 THOUSANDS/ΜL (ref 0–0.1)
BASOPHILS NFR BLD AUTO: 2 % (ref 0–1)
BILIRUB SERPL-MCNC: 0.34 MG/DL (ref 0.2–1)
BILIRUB UR QL STRIP: NEGATIVE
BUN SERPL-MCNC: 13 MG/DL (ref 5–25)
CALCIUM ALBUM COR SERPL-MCNC: 9.8 MG/DL (ref 8.3–10.1)
CALCIUM SERPL-MCNC: 9 MG/DL (ref 8.3–10.1)
CHLORIDE SERPL-SCNC: 104 MMOL/L (ref 100–108)
CLARITY UR: CLEAR
CO2 SERPL-SCNC: 32 MMOL/L (ref 21–32)
COLOR UR: YELLOW
CREAT SERPL-MCNC: 1.13 MG/DL (ref 0.6–1.3)
EOSINOPHIL # BLD AUTO: 0.19 THOUSAND/ΜL (ref 0–0.61)
EOSINOPHIL NFR BLD AUTO: 1 % (ref 0–6)
ERYTHROCYTE [DISTWIDTH] IN BLOOD BY AUTOMATED COUNT: 13.7 % (ref 11.6–15.1)
ERYTHROCYTE [SEDIMENTATION RATE] IN BLOOD: 65 MM/HOUR (ref 0–29)
FLUAV RNA RESP QL NAA+PROBE: NEGATIVE
FLUBV RNA RESP QL NAA+PROBE: NEGATIVE
GFR SERPL CREATININE-BSD FRML MDRD: 46 ML/MIN/1.73SQ M
GLUCOSE SERPL-MCNC: 94 MG/DL (ref 65–140)
GLUCOSE UR STRIP-MCNC: NEGATIVE MG/DL
HCT VFR BLD AUTO: 41.7 % (ref 34.8–46.1)
HGB BLD-MCNC: 13.3 G/DL (ref 11.5–15.4)
HGB UR QL STRIP.AUTO: ABNORMAL
IMM GRANULOCYTES # BLD AUTO: 0.44 THOUSAND/UL (ref 0–0.2)
IMM GRANULOCYTES NFR BLD AUTO: 2 % (ref 0–2)
KETONES UR STRIP-MCNC: NEGATIVE MG/DL
LACTATE SERPL-SCNC: 0.7 MMOL/L (ref 0.5–2)
LEUKOCYTE ESTERASE UR QL STRIP: NEGATIVE
LYMPHOCYTES # BLD AUTO: 2.09 THOUSANDS/ΜL (ref 0.6–4.47)
LYMPHOCYTES NFR BLD AUTO: 10 % (ref 14–44)
MAGNESIUM SERPL-MCNC: 2 MG/DL (ref 1.6–2.6)
MCH RBC QN AUTO: 31.9 PG (ref 26.8–34.3)
MCHC RBC AUTO-ENTMCNC: 31.9 G/DL (ref 31.4–37.4)
MCV RBC AUTO: 100 FL (ref 82–98)
MONOCYTES # BLD AUTO: 2.3 THOUSAND/ΜL (ref 0.17–1.22)
MONOCYTES NFR BLD AUTO: 11 % (ref 4–12)
NEUTROPHILS # BLD AUTO: 14.84 THOUSANDS/ΜL (ref 1.85–7.62)
NEUTS SEG NFR BLD AUTO: 74 % (ref 43–75)
NITRITE UR QL STRIP: NEGATIVE
NON-SQ EPI CELLS URNS QL MICRO: ABNORMAL /HPF
NRBC BLD AUTO-RTO: 0 /100 WBCS
P AXIS: 77 DEGREES
PH UR STRIP.AUTO: 6 [PH]
PLATELET # BLD AUTO: 320 THOUSANDS/UL (ref 149–390)
PMV BLD AUTO: 12.3 FL (ref 8.9–12.7)
POTASSIUM SERPL-SCNC: 4 MMOL/L (ref 3.5–5.3)
PR INTERVAL: 168 MS
PROCALCITONIN SERPL-MCNC: 0.05 NG/ML
PROT SERPL-MCNC: 7.3 G/DL (ref 6.4–8.2)
PROT UR STRIP-MCNC: NEGATIVE MG/DL
QRS AXIS: 23 DEGREES
QRSD INTERVAL: 80 MS
QT INTERVAL: 388 MS
QTC INTERVAL: 419 MS
RBC # BLD AUTO: 4.17 MILLION/UL (ref 3.81–5.12)
RBC #/AREA URNS AUTO: ABNORMAL /HPF
RSV RNA RESP QL NAA+PROBE: NEGATIVE
SARS-COV-2 RNA RESP QL NAA+PROBE: NEGATIVE
SODIUM SERPL-SCNC: 142 MMOL/L (ref 136–145)
SP GR UR STRIP.AUTO: 1.02 (ref 1–1.03)
T WAVE AXIS: 39 DEGREES
UROBILINOGEN UR QL STRIP.AUTO: 0.2 E.U./DL
VENTRICULAR RATE: 70 BPM
WBC # BLD AUTO: 20.19 THOUSAND/UL (ref 4.31–10.16)
WBC #/AREA URNS AUTO: ABNORMAL /HPF

## 2022-04-18 PROCEDURE — 94640 AIRWAY INHALATION TREATMENT: CPT

## 2022-04-18 PROCEDURE — 73610 X-RAY EXAM OF ANKLE: CPT

## 2022-04-18 PROCEDURE — 81001 URINALYSIS AUTO W/SCOPE: CPT | Performed by: STUDENT IN AN ORGANIZED HEALTH CARE EDUCATION/TRAINING PROGRAM

## 2022-04-18 PROCEDURE — 96365 THER/PROPH/DIAG IV INF INIT: CPT

## 2022-04-18 PROCEDURE — 96361 HYDRATE IV INFUSION ADD-ON: CPT

## 2022-04-18 PROCEDURE — 85652 RBC SED RATE AUTOMATED: CPT | Performed by: EMERGENCY MEDICINE

## 2022-04-18 PROCEDURE — 83605 ASSAY OF LACTIC ACID: CPT | Performed by: EMERGENCY MEDICINE

## 2022-04-18 PROCEDURE — 93005 ELECTROCARDIOGRAM TRACING: CPT

## 2022-04-18 PROCEDURE — 93010 ELECTROCARDIOGRAM REPORT: CPT | Performed by: INTERNAL MEDICINE

## 2022-04-18 PROCEDURE — 87081 CULTURE SCREEN ONLY: CPT | Performed by: STUDENT IN AN ORGANIZED HEALTH CARE EDUCATION/TRAINING PROGRAM

## 2022-04-18 PROCEDURE — 99291 CRITICAL CARE FIRST HOUR: CPT | Performed by: EMERGENCY MEDICINE

## 2022-04-18 PROCEDURE — 36415 COLL VENOUS BLD VENIPUNCTURE: CPT | Performed by: EMERGENCY MEDICINE

## 2022-04-18 PROCEDURE — 83735 ASSAY OF MAGNESIUM: CPT | Performed by: EMERGENCY MEDICINE

## 2022-04-18 PROCEDURE — 96367 TX/PROPH/DG ADDL SEQ IV INF: CPT

## 2022-04-18 PROCEDURE — 96374 THER/PROPH/DIAG INJ IV PUSH: CPT

## 2022-04-18 PROCEDURE — 94760 N-INVAS EAR/PLS OXIMETRY 1: CPT

## 2022-04-18 PROCEDURE — 80053 COMPREHEN METABOLIC PANEL: CPT | Performed by: EMERGENCY MEDICINE

## 2022-04-18 PROCEDURE — 99284 EMERGENCY DEPT VISIT MOD MDM: CPT

## 2022-04-18 PROCEDURE — 0241U HB NFCT DS VIR RESP RNA 4 TRGT: CPT | Performed by: EMERGENCY MEDICINE

## 2022-04-18 PROCEDURE — 84145 PROCALCITONIN (PCT): CPT | Performed by: EMERGENCY MEDICINE

## 2022-04-18 PROCEDURE — 87040 BLOOD CULTURE FOR BACTERIA: CPT | Performed by: EMERGENCY MEDICINE

## 2022-04-18 PROCEDURE — 85025 COMPLETE CBC W/AUTO DIFF WBC: CPT | Performed by: EMERGENCY MEDICINE

## 2022-04-18 PROCEDURE — 73630 X-RAY EXAM OF FOOT: CPT

## 2022-04-18 RX ORDER — MORPHINE SULFATE 4 MG/ML
4 INJECTION, SOLUTION INTRAMUSCULAR; INTRAVENOUS ONCE
Status: COMPLETED | OUTPATIENT
Start: 2022-04-18 | End: 2022-04-18

## 2022-04-18 RX ORDER — LISINOPRIL 5 MG/1
5 TABLET ORAL DAILY
Status: DISCONTINUED | OUTPATIENT
Start: 2022-04-19 | End: 2022-04-19

## 2022-04-18 RX ORDER — OXYCODONE HYDROCHLORIDE 5 MG/1
2.5 TABLET ORAL EVERY 4 HOURS PRN
Status: DISCONTINUED | OUTPATIENT
Start: 2022-04-18 | End: 2022-04-22 | Stop reason: HOSPADM

## 2022-04-18 RX ORDER — ASPIRIN 81 MG/1
81 TABLET ORAL DAILY
Status: DISCONTINUED | OUTPATIENT
Start: 2022-04-19 | End: 2022-04-22 | Stop reason: HOSPADM

## 2022-04-18 RX ORDER — CEFTRIAXONE 2 G/50ML
2000 INJECTION, SOLUTION INTRAVENOUS ONCE
Status: COMPLETED | OUTPATIENT
Start: 2022-04-18 | End: 2022-04-18

## 2022-04-18 RX ORDER — ACETAMINOPHEN 325 MG/1
650 TABLET ORAL EVERY 6 HOURS PRN
Status: DISCONTINUED | OUTPATIENT
Start: 2022-04-18 | End: 2022-04-21

## 2022-04-18 RX ORDER — CEFAZOLIN SODIUM 1 G/50ML
1000 SOLUTION INTRAVENOUS EVERY 8 HOURS
Status: DISCONTINUED | OUTPATIENT
Start: 2022-04-18 | End: 2022-04-20

## 2022-04-18 RX ORDER — FLUTICASONE PROPIONATE 110 UG/1
1 AEROSOL, METERED RESPIRATORY (INHALATION) EVERY 12 HOURS SCHEDULED
Status: DISCONTINUED | OUTPATIENT
Start: 2022-04-18 | End: 2022-04-22 | Stop reason: HOSPADM

## 2022-04-18 RX ORDER — IPRATROPIUM BROMIDE AND ALBUTEROL SULFATE 2.5; .5 MG/3ML; MG/3ML
3 SOLUTION RESPIRATORY (INHALATION)
Status: DISCONTINUED | OUTPATIENT
Start: 2022-04-18 | End: 2022-04-19

## 2022-04-18 RX ORDER — ATORVASTATIN CALCIUM 40 MG/1
40 TABLET, FILM COATED ORAL DAILY
Status: DISCONTINUED | OUTPATIENT
Start: 2022-04-19 | End: 2022-04-22 | Stop reason: HOSPADM

## 2022-04-18 RX ORDER — SODIUM CHLORIDE 9 MG/ML
125 INJECTION, SOLUTION INTRAVENOUS CONTINUOUS
Status: DISCONTINUED | OUTPATIENT
Start: 2022-04-18 | End: 2022-04-21

## 2022-04-18 RX ADMIN — IPRATROPIUM BROMIDE AND ALBUTEROL SULFATE 3 ML: 2.5; .5 SOLUTION RESPIRATORY (INHALATION) at 23:02

## 2022-04-18 RX ADMIN — Medication 12.5 MG: at 18:02

## 2022-04-18 RX ADMIN — MORPHINE SULFATE 4 MG: 4 INJECTION INTRAVENOUS at 13:00

## 2022-04-18 RX ADMIN — CEFAZOLIN SODIUM 1000 MG: 1 SOLUTION INTRAVENOUS at 18:02

## 2022-04-18 RX ADMIN — SODIUM CHLORIDE 125 ML/HR: 0.9 INJECTION, SOLUTION INTRAVENOUS at 23:35

## 2022-04-18 RX ADMIN — CEFTRIAXONE 2000 MG: 2 INJECTION, SOLUTION INTRAVENOUS at 14:38

## 2022-04-18 RX ADMIN — SODIUM CHLORIDE 125 ML/HR: 0.9 INJECTION, SOLUTION INTRAVENOUS at 13:03

## 2022-04-18 RX ADMIN — FLUTICASONE PROPIONATE 1 PUFF: 110 AEROSOL, METERED RESPIRATORY (INHALATION) at 21:44

## 2022-04-18 RX ADMIN — IPRATROPIUM BROMIDE AND ALBUTEROL SULFATE 3 ML: 2.5; .5 SOLUTION RESPIRATORY (INHALATION) at 20:09

## 2022-04-18 RX ADMIN — VANCOMYCIN HYDROCHLORIDE 1250 MG: 10 INJECTION, POWDER, LYOPHILIZED, FOR SOLUTION INTRAVENOUS at 15:38

## 2022-04-18 RX ADMIN — APIXABAN 5 MG: 5 TABLET, FILM COATED ORAL at 18:02

## 2022-04-18 NOTE — H&P
Ariadna 45  Progress Note - Makenzie Espinoza 1945, 68 y o  female MRN: [id number]  Unit/Bed#: [address] [place] [address] Encounter: [id number]  Primary Care Provider: Lebron Hubbard DO   Date and time admitted to hospital: 4/18/2022 12:11 PM    * Sepsis [facility])  Assessment & Plan  Patient met SIRS criteria with tachypnea and elevated white blood cell count of more than 20 with suspected source being cellulitis of left foot and ankle  Patient received vancomycin and ceftriaxone at the ED  Lactic acid was within normal limits  X-ray showed no acute abnormality but with swelling  Blood cultures were collected x2  Procalcitonin within normal limits  · Monitor vital signs  · Monitor blood cultures  · Continue vancomycin and cefazolin  · MRSA swab, consider discontinuing vancomycin if negative  · Monitor for improvement daily by marking cellulitis  Acute pulmonary embolism without acute cor pulmonale [facility])  Assessment & Plan  History of multiple pulmonary emboli back in January of 2022  She was started on Eliquis at that time  Stage 3 chronic kidney disease [facility])  Assessment & Plan  Lab Results   Component Value Date    EGFR 46 04/18/2022    EGFR 64 01/26/2022    EGFR 58 01/25/2022    CREATININE 1 13 04/18/2022    CREATININE 0 88 01/26/2022    CREATININE 0 95 01/25/2022   Baseline creatinine at 1 with GFR below 60s  Monitor creatinine daily  Limit nephrotoxic agents and renally dose medications  Mixed hyperlipidemia  Assessment & Plan  Continue home Lipitor  CAD (coronary artery disease)  Assessment & Plan  Continue home medication aspirin  Essential hypertension  Assessment & Plan  Initial blood pressure elevated but 2nd reading lower  Will continue to monitor and administer home medication Lopressor and lisinopril  Centrilobular emphysema ([address] )  Assessment & Plan  Patient is on home oxygen at baseline for this    Her home oxygen requirements are  Continue with home medications Trelegy and albuterol    Patient will be admitted for inpatient care under the care of Dr Zan Mann  Expected length of stay is more than 2 midnights  History of Present Illness   80-year-old female with past medical history of centrilobular emphysema, hypertension, CAD, hyperlipidemia, stage III CKD, and history of pulmonary embolism presents today with left foot and ankle swelling and erythema  This started week and half ago  She denies any trauma to the area  She had a recent admission in January for the same and was treated with vancomycin at that time  Review of Systems   Constitutional: Negative for chills and fever  Respiratory: Positive for shortness of breath (Reports she has shortness of breath at baseline and that she uses 3 L at rest during the day and 6 L at rest during sleep  Reports shortness of breath is at baseline an not worse)  Cardiovascular: Positive for leg swelling  Negative for chest pain  Gastrointestinal: Negative for abdominal pain, diarrhea, nausea and vomiting  Genitourinary: Negative for difficulty urinating and dysuria  Historical Information   Past Medical History:   Diagnosis Date    Breast lump     CHF (congestive heart failure) (HCC)     COPD (chronic obstructive pulmonary disease) (HCC)     Diverticulitis     Emphysema of lung (HCC)     History of chronic obstructive lung disease     Non-productive cough     SOBOE (shortness of breath on exertion)      History reviewed  No pertinent surgical history    Social History   Social History     Substance and Sexual Activity   Alcohol Use Not Currently     Social History     Substance and Sexual Activity   Drug Use No     Social History     Tobacco Use   Smoking Status Former Smoker    Packs/day: 1 50    Years: 55 00    Pack years: 82 50    Quit date: 2010    Years since quittin 8   Smokeless Tobacco Never Used   Tobacco Comment    ex cigarette smoker     E-Cigarette Use: Never User E-Cigarette/Vaping Substances       Family History:   Family History   Problem Relation Age of Onset    Stroke Mother     Diabetes Father     Heart disease Father     Emphysema Family     Stroke Family         syndrome    Diabetes Sister        Meds/Allergies   PTA meds:   Prior to Admission Medications   Prescriptions Last Dose Informant Patient Reported? Taking? Calcium 600 MG tablet 4/17/2022 at Unknown time Self Yes Yes   Sig: Take by mouth     DAILY MULTIPLE VITAMINS PO 4/17/2022 at Unknown time Self Yes Yes   Sig: Take by mouth   albuterol (2 5 mg/3 mL) 0 083 % nebulizer solution 4/17/2022 at Unknown time Self No Yes   Sig: USE 1 VIAL IN NEBULIZER 3 TIMES DAILY   albuterol (Ventolin HFA) 90 mcg/act inhaler 4/17/2022 at Unknown time Self No Yes   Sig: Inhale 2 puffs 4 (four) times a day   apixaban (Eliquis) 5 mg 4/17/2022 at Unknown time  No Yes   Sig: Take 1 tablet (5 mg total) by mouth 2 (two) times a day   aspirin (ASPIR-LOW) 81 mg EC tablet 4/17/2022 at Unknown time Self Yes Yes   Sig: Take 1 tablet by mouth daily   atorvastatin (LIPITOR) 40 mg tablet 4/17/2022 at Unknown time Self No Yes   Sig: Take 1 tablet (40 mg total) by mouth daily   fluticasone-umeclidinium-vilanterol (Trelegy Ellipta) 100-62 5-25 MCG/INH inhaler 4/17/2022 at Unknown time Self No Yes   Sig: Rinse mouth after use     lisinopril (ZESTRIL) 5 mg tablet 4/17/2022 at Unknown time  No Yes   Sig: Take 1 tablet by mouth once daily   metoprolol tartrate (LOPRESSOR) 25 mg tablet Past Month at Unknown time  No Yes   Sig: Take 1/2 (one-half) tablet by mouth twice daily      Facility-Administered Medications: None     No Known Allergies    Objective   First Vitals:   Blood Pressure: (!) 184/92 (04/18/22 1135)  Pulse: 93 (04/18/22 1135)  Temperature: (!) 96 9 °F (36 1 °C) (04/18/22 1135)  Temp Source: Oral (04/18/22 1651)  Respirations: (!) 24 (04/18/22 1135)  Height: 5' 1" (154 9 cm) (04/18/22 1135)  Weight - Scale: 85 7 kg (189 lb) (04/18/22 1135)  SpO2: 93 % (04/18/22 1135)    Current Vitals:   Blood Pressure: 159/75 (04/18/22 1651)  Pulse: 78 (04/18/22 1651)  Temperature: 97 8 °F (36 6 °C) (04/18/22 1651)  Temp Source: Oral (04/18/22 1651)  Respirations: 20 (04/18/22 1651)  Height: 5' 1" (154 9 cm) (04/18/22 1135)  Weight - Scale: 85 7 kg (189 lb) (04/18/22 1135)  SpO2: 98 % (04/18/22 1615)      Intake/Output Summary (Last 24 hours) at 4/18/2022 1757  Last data filed at 4/18/2022 1508  Gross per 24 hour   Intake 50 ml   Output --   Net 50 ml       Invasive Devices  Report    Peripheral Intravenous Line            Peripheral IV 04/18/22 Right Antecubital <1 day    Peripheral IV 04/18/22 Right;Ventral (anterior) Wrist <1 day                Physical Exam  Vitals reviewed  Constitutional:       General: She is awake  She is not in acute distress  Cardiovascular:      Rate and Rhythm: Normal rate and regular rhythm  Pulses:           Dorsalis pedis pulses are 2+ on the right side and 2+ on the left side  Posterior tibial pulses are 2+ on the right side and 2+ on the left side  Heart sounds: Normal heart sounds, S1 normal and S2 normal    Pulmonary:      Effort: Pulmonary effort is normal       Breath sounds: No decreased air movement  Wheezing (Scattered, minimal) present  No decreased breath sounds, rhonchi or rales  Abdominal:      General: Abdomen is flat  Bowel sounds are normal       Palpations: Abdomen is soft  Tenderness: There is no abdominal tenderness  There is no guarding  Musculoskeletal:      Right lower leg: No edema  Left lower leg: Edema ( minimal swelling around cellulitis ) present  Left ankle: No swelling  No tenderness  Normal range of motion  Skin:     General: Skin is warm  Comments: See attached photos  Patient has erythema and tenderness to palpation, after administration morphine she reports the pain is a lot better  Minimal swelling     Neurological:      Mental Status: She is alert  Psychiatric:         Behavior: Behavior is cooperative  Lab Results:   Results Reviewed     Procedure Component Value Units Date/Time    Lactic acid [[id number]]  (Normal) Collected: 04/18/22 1429    Lab Status: Final result Specimen: Blood from Arm, Right Updated: 04/18/22 1506     LACTIC ACID 0 7 mmol/L     Narrative:      Result may be elevated if tourniquet was used during collection  Procalcitonin [[id number]]  (Normal) Collected: 04/18/22 1306    Lab Status: Final result Specimen: Blood from Arm, Right Updated: 04/18/22 1446     Procalcitonin 0 05 ng/ml     Blood culture #2 [[id number]] Collected: 04/18/22 1429    Lab Status: In process Specimen: Blood from Arm, Right Updated: 04/18/22 1434    Blood culture #1 [[id number]] Collected: 04/18/22 1424    Lab Status: In process Specimen: Blood from Arm, Right Updated: 04/18/22 1433    COVID/FLU/RSV - 2 hour TAT [[id number]]  (Normal) Collected: 04/18/22 1308    Lab Status: Final result Specimen: Nares from Nose Updated: 04/18/22 1400     SARS-CoV-2 Negative     INFLUENZA A PCR Negative     INFLUENZA B PCR Negative     RSV PCR Negative    Narrative:      FOR PEDIATRIC PATIENTS - copy/paste COVID Guidelines URL to browser: [email or web address]    SARS-CoV-2 assay is a Nucleic Acid Amplification assay intended for the  qualitative detection of nucleic acid from SARS-CoV-2 in nasopharyngeal  swabs  Results are for the presumptive identification of SARS-CoV-2 RNA  Positive results are indicative of infection with SARS-CoV-2, the virus  causing COVID-19, but do not rule out bacterial infection or co-infection  with other viruses  Laboratories within the [place] and its  territories are required to report all positive results to the appropriate  public health authorities   Negative results do not preclude SARS-CoV-2  infection and should not be used as the sole basis for treatment or other  patient management decisions  Negative results must be combined with  clinical observations, patient history, and epidemiological information  This test has not been FDA cleared or approved  This test has been authorized by FDA under an Emergency Use Authorization  (EUA)  This test is only authorized for the duration of time the  declaration that circumstances exist justifying the authorization of the  emergency use of an in vitro diagnostic tests for detection of SARS-CoV-2  virus and/or diagnosis of COVID-19 infection under section 564(b)(1) of  the Act, 21 U  [place] C  044EBE-0(E)(3), unless the authorization is terminated  or revoked sooner  The test has been validated but independent review by FDA  and CLIA is pending  Test performed using [employer] GeneXpert: This RT-PCR assay targets N2,  a region unique to SARS-CoV-2  A conserved region in the E-gene was chosen  for pan-Sarbecovirus detection which includes SARS-CoV-2      Comprehensive metabolic panel [[id number]]  (Abnormal) Collected: 04/18/22 1306    Lab Status: Final result Specimen: Blood from Arm, Right Updated: 04/18/22 1337     Sodium 142 mmol/L      Potassium 4 0 mmol/L      Chloride 104 mmol/L      CO2 32 mmol/L      ANION GAP 6 mmol/L      BUN 13 mg/dL      Creatinine 1 13 mg/dL      Glucose 94 mg/dL      Calcium 9 0 mg/dL      Corrected Calcium 9 8 mg/dL      AST 23 U/L      ALT 25 U/L      Alkaline Phosphatase 84 U/L      Total Protein 7 3 g/dL      Albumin 3 0 g/dL      Total Bilirubin 0 34 mg/dL      eGFR 46 ml/min/1 73sq m     Narrative:      [place] guidelines for Chronic Kidney Disease (CKD):     Stage 1 with normal or high GFR (GFR > 90 mL/min/1 73 square meters)    Stage 2 Mild CKD (GFR = 60-89 mL/min/1 73 square meters)    Stage 3A Moderate CKD (GFR = 45-59 mL/min/1 73 square meters)    Stage 3B Moderate CKD (GFR = 30-44 mL/min/1 73 square meters)    Stage 4 Severe CKD (GFR = 15-29 mL/min/1 73 square meters)    Stage 5 End Stage CKD (GFR <15 mL/min/1 73 square meters)  Note: GFR calculation is accurate only with a steady state creatinine    Magnesium [[id number]]  (Normal) Collected: 04/18/22 1306    Lab Status: Final result Specimen: Blood from Arm, Right Updated: 04/18/22 1337     Magnesium 2 0 mg/dL     Sedimentation rate, automated [[id number]]  (Abnormal) Collected: 04/18/22 1306    Lab Status: Final result Specimen: Blood from Arm, Right Updated: 04/18/22 1329     Sed Rate 65 mm/hour     CBC and differential [[id number]]  (Abnormal) Collected: 04/18/22 1306    Lab Status: Final result Specimen: Blood from Arm, Right Updated: 04/18/22 1319     WBC 20 19 Thousand/uL      RBC 4 17 Million/uL      Hemoglobin 13 3 g/dL      Hematocrit 41 7 %       fL      MCH 31 9 pg      MCHC 31 9 g/dL      RDW 13 7 %      MPV 12 3 fL      Platelets 643 Thousands/uL      nRBC 0 /100 WBCs      Neutrophils Relative 74 %      Immat GRANS % 2 %      Lymphocytes Relative 10 %      Monocytes Relative 11 %      Eosinophils Relative 1 %      Basophils Relative 2 %      Neutrophils Absolute 14 84 Thousands/µL      Immature Grans Absolute 0 44 Thousand/uL      Lymphocytes Absolute 2 09 Thousands/µL      Monocytes Absolute 2 30 Thousand/µL      Eosinophils Absolute 0 19 Thousand/µL      Basophils Absolute 0 33 Thousands/µL     UA w Reflex to Microscopic w Reflex to Culture [[id number]]     Lab Status: No result Specimen: Urine           Imaging:   XR foot 3+ views LEFT   Final Result      Soft tissue swelling  No acute osseous abnormality            Workstation performed: [id number]         XR ankle 3+ views LEFT   Final Result      Soft tissue swelling  No acute osseous abnormality            Workstation performed: [id number]           Code Status:  Full code  Advance Directive and Living Will:      Power of :    POLST:      Counseling / Coordination of Care: Total floor / unit time spent today 30 minutes   and Greater than 50% of total time was spent with the patient and / or family counseling and / or coordination of care  A description of the counseling / coordination of care: cellulitis

## 2022-04-18 NOTE — ASSESSMENT & PLAN NOTE
History of multiple pulmonary emboli back in January of 2022  She was started on Eliquis at that time

## 2022-04-18 NOTE — ASSESSMENT & PLAN NOTE
Patient met SIRS criteria with tachypnea and elevated white blood cell count of more than 20 with suspected source being cellulitis of left foot and ankle  Patient received vancomycin and ceftriaxone at the ED  Lactic acid was within normal limits  X-ray showed no acute abnormality but with swelling  Blood cultures were collected x2  Procalcitonin within normal limits  · Monitor vital signs  · Monitor blood cultures  · Continue vancomycin and cefazolin  · MRSA swab, consider discontinuing vancomycin if negative  · Monitor for improvement daily by marking cellulitis

## 2022-04-18 NOTE — PLAN OF CARE
Problem: Potential for Falls  Goal: Patient will remain free of falls  Description: INTERVENTIONS:  - Educate patient/family on patient safety including physical limitations  - Instruct patient to call for assistance with activity   - Consult OT/PT to assist with strengthening/mobility   - Keep Call bell within reach  - Keep bed low and locked with side rails adjusted as appropriate  - Keep care items and personal belongings within reach  - Initiate and maintain comfort rounds  - Make Fall Risk Sign visible to staff  - Apply yellow socks and bracelet for high fall risk patients  - Consider moving patient to room near nurses station  4/18/2022 1841 by Valdemar Lema RN  Outcome: Progressing  4/18/2022 1839 by Valdemar Lema RN  Outcome: Progressing

## 2022-04-18 NOTE — ASSESSMENT & PLAN NOTE
Initial blood pressure elevated but 2nd reading lower  Will continue to monitor and administer home medication Lopressor and lisinopril

## 2022-04-18 NOTE — ASSESSMENT & PLAN NOTE
Patient is on home oxygen at baseline for this    Her home oxygen requirements are  Continue with home medications Trelegy and albuterol

## 2022-04-18 NOTE — ED PROVIDER NOTES
History  Chief Complaint   Patient presents with    Leg Swelling     left ankle/foot swelling and redness for 2 weeks  26-year-old female with past history of COPD, emphysema, on supplemental nasal cannula oxygen, diverticulitis, presents to the ED for evaluation left foot and ankle swelling over the past 2 weeks  Patient initially noted a small area of swelling to the dorsal aspect of left foot  Since that time the swelling has significantly spread to the entire foot and now going up the ankle and distal left lower extremity  Patient has significant pain to the area  Daughter became concerned that patient is having cellulitis  Daughter brought patient to the ED for further evaluation  Patient only told daughter regarding the infection today  History provided by:  Patient      Prior to Admission Medications   Prescriptions Last Dose Informant Patient Reported? Taking? Calcium 600 MG tablet  Self Yes No   Sig: Take by mouth   Patient not taking: Reported on 2/8/2022    DAILY MULTIPLE VITAMINS PO  Self Yes No   Sig: Take by mouth   albuterol (2 5 mg/3 mL) 0 083 % nebulizer solution  Self No No   Sig: USE 1 VIAL IN NEBULIZER 3 TIMES DAILY   albuterol (Ventolin HFA) 90 mcg/act inhaler  Self No No   Sig: Inhale 2 puffs 4 (four) times a day   apixaban (Eliquis) 5 mg   No No   Sig: Take 1 tablet (5 mg total) by mouth 2 (two) times a day   aspirin (ASPIR-LOW) 81 mg EC tablet  Self Yes No   Sig: Take 1 tablet by mouth daily   atorvastatin (LIPITOR) 40 mg tablet  Self No No   Sig: Take 1 tablet (40 mg total) by mouth daily   fluticasone-umeclidinium-vilanterol (Trelegy Ellipta) 100-62 5-25 MCG/INH inhaler  Self No No   Sig: Rinse mouth after use     lisinopril (ZESTRIL) 5 mg tablet   No No   Sig: Take 1 tablet by mouth once daily   metoprolol tartrate (LOPRESSOR) 25 mg tablet   No No   Sig: Take 1/2 (one-half) tablet by mouth twice daily      Facility-Administered Medications: None       Past Medical History: Diagnosis Date    Breast lump     CHF (congestive heart failure) (HCC)     COPD (chronic obstructive pulmonary disease) (HCC)     Diverticulitis     Emphysema of lung (HCC)     History of chronic obstructive lung disease     Non-productive cough     SOBOE (shortness of breath on exertion)        History reviewed  No pertinent surgical history  Family History   Problem Relation Age of Onset    Stroke Mother     Diabetes Father     Heart disease Father     Emphysema Family     Stroke Family         syndrome    Diabetes Sister      I have reviewed and agree with the history as documented  E-Cigarette/Vaping    E-Cigarette Use Never User      E-Cigarette/Vaping Substances     Social History     Tobacco Use    Smoking status: Former Smoker     Packs/day: 1 50     Years: 55 00     Pack years: 82 50     Quit date: 2010     Years since quittin 8    Smokeless tobacco: Never Used    Tobacco comment: ex cigarette smoker   Vaping Use    Vaping Use: Never used   Substance Use Topics    Alcohol use: Not Currently    Drug use: No       Review of Systems   Constitutional: Negative for activity change, appetite change, chills and fever  HENT: Negative for congestion and ear pain  Eyes: Negative for pain and discharge  Respiratory: Negative for cough, chest tightness, shortness of breath, wheezing and stridor  Cardiovascular: Negative for chest pain and palpitations  Gastrointestinal: Negative for abdominal distention, abdominal pain, constipation, diarrhea and nausea  Endocrine: Negative for cold intolerance  Genitourinary: Negative for dysuria, frequency and urgency  Musculoskeletal: Negative for arthralgias and back pain  Skin: Positive for rash  Negative for color change  Allergic/Immunologic: Negative for environmental allergies and food allergies  Neurological: Negative for dizziness, weakness, numbness and headaches  Hematological: Negative for adenopathy  Psychiatric/Behavioral: Negative for agitation, behavioral problems and confusion  The patient is not nervous/anxious  All other systems reviewed and are negative  Physical Exam  Physical Exam  Vitals and nursing note reviewed  Constitutional:       Appearance: She is well-developed  HENT:      Head: Normocephalic and atraumatic  Eyes:      Conjunctiva/sclera: Conjunctivae normal    Cardiovascular:      Rate and Rhythm: Normal rate and regular rhythm  Heart sounds: Normal heart sounds  Pulmonary:      Effort: Pulmonary effort is normal       Breath sounds: Normal breath sounds  Abdominal:      General: Bowel sounds are normal  There is no distension  Palpations: Abdomen is soft  Tenderness: There is no abdominal tenderness  Musculoskeletal:         General: Normal range of motion  Cervical back: Normal range of motion and neck supple  Comments: Pulses intact to bilateral lower extremities  Significant erythema, edema, warmth to touch noted to entire left foot and ankle  Area is tender to palpation  Please see picture below for clarification  Skin:     General: Skin is warm and dry  Neurological:      Mental Status: She is alert and oriented to person, place, and time  Psychiatric:         Behavior: Behavior normal          Thought Content:  Thought content normal          Judgment: Judgment normal                Vital Signs  ED Triage Vitals [04/18/22 1135]   Temperature Pulse Respirations Blood Pressure SpO2   (!) 96 9 °F (36 1 °C) 93 (!) 24 (!) 184/92 93 %      Temp src Heart Rate Source Patient Position - Orthostatic VS BP Location FiO2 (%)   -- -- -- -- --      Pain Score       10 - Worst Possible Pain           Vitals:    04/18/22 1135 04/18/22 1345 04/18/22 1530 04/18/22 1615   BP: (!) 184/92      Pulse: 93 66 66 68         Visual Acuity      ED Medications  Medications   sodium chloride 0 9 % infusion (125 mL/hr Intravenous New Bag 4/18/22 1303) vancomycin (VANCOCIN) 1,250 mg in sodium chloride 0 9 % 250 mL IVPB (1,250 mg Intravenous New Bag 4/18/22 1538)   morphine (PF) 4 mg/mL injection 4 mg (4 mg Intravenous Given 4/18/22 1300)   cefTRIAXone (ROCEPHIN) IVPB (premix in dextrose) 2,000 mg 50 mL (0 mg Intravenous Stopped 4/18/22 1508)       Diagnostic Studies  Results Reviewed     Procedure Component Value Units Date/Time    Lactic acid [[id number]]  (Normal) Collected: 04/18/22 1429    Lab Status: Final result Specimen: Blood from Arm, Right Updated: 04/18/22 1506     LACTIC ACID 0 7 mmol/L     Narrative:      Result may be elevated if tourniquet was used during collection  Procalcitonin [[id number]]  (Normal) Collected: 04/18/22 1306    Lab Status: Final result Specimen: Blood from Arm, Right Updated: 04/18/22 1446     Procalcitonin 0 05 ng/ml     Blood culture #2 [[id number]] Collected: 04/18/22 1429    Lab Status: In process Specimen: Blood from Arm, Right Updated: 04/18/22 1434    Blood culture #1 [[id number]] Collected: 04/18/22 1424    Lab Status: In process Specimen: Blood from Arm, Right Updated: 04/18/22 1433    COVID/FLU/RSV - 2 hour TAT [[id number]]  (Normal) Collected: 04/18/22 1308    Lab Status: Final result Specimen: Nares from Nose Updated: 04/18/22 1400     SARS-CoV-2 Negative     INFLUENZA A PCR Negative     INFLUENZA B PCR Negative     RSV PCR Negative    Narrative:      FOR PEDIATRIC PATIENTS - copy/paste COVID Guidelines URL to browser: [email or web address]    SARS-CoV-2 assay is a Nucleic Acid Amplification assay intended for the  qualitative detection of nucleic acid from SARS-CoV-2 in nasopharyngeal  swabs  Results are for the presumptive identification of SARS-CoV-2 RNA  Positive results are indicative of infection with SARS-CoV-2, the virus  causing COVID-19, but do not rule out bacterial infection or co-infection  with other viruses   Laboratories within the [place] and its  territories are required to report all positive results to the appropriate  public health authorities  Negative results do not preclude SARS-CoV-2  infection and should not be used as the sole basis for treatment or other  patient management decisions  Negative results must be combined with  clinical observations, patient history, and epidemiological information  This test has not been FDA cleared or approved  This test has been authorized by FDA under an Emergency Use Authorization  (EUA)  This test is only authorized for the duration of time the  declaration that circumstances exist justifying the authorization of the  emergency use of an in vitro diagnostic tests for detection of SARS-CoV-2  virus and/or diagnosis of COVID-19 infection under section 564(b)(1) of  the Act, 21 U  S C  658ZLB-9(U)(0), unless the authorization is terminated  or revoked sooner  The test has been validated but independent review by FDA  and CLIA is pending  Test performed using [employer] Gene[employer]pert: This RT-PCR assay targets N2,  a region unique to SARS-CoV-2  A conserved region in the E-gene was chosen  for pan-Sarbecovirus detection which includes SARS-CoV-2      Comprehensive metabolic panel [[id number]]  (Abnormal) Collected: 04/18/22 1306    Lab Status: Final result Specimen: Blood from Arm, Right Updated: 04/18/22 1337     Sodium 142 mmol/L      Potassium 4 0 mmol/L      Chloride 104 mmol/L      CO2 32 mmol/L      ANION GAP 6 mmol/L      BUN 13 mg/dL      Creatinine 1 13 mg/dL      Glucose 94 mg/dL      Calcium 9 0 mg/dL      Corrected Calcium 9 8 mg/dL      AST 23 U/L      ALT 25 U/L      Alkaline Phosphatase 84 U/L      Total Protein 7 3 g/dL      Albumin 3 0 g/dL      Total Bilirubin 0 34 mg/dL      eGFR 46 ml/min/1 73sq m     Narrative:      [place] guidelines for Chronic Kidney Disease (CKD):     Stage 1 with normal or high GFR (GFR > 90 mL/min/1 73 square meters)    Stage 2 Mild CKD (GFR = 60-89 mL/min/1 73 square meters)    Stage 3A Moderate CKD (GFR = 45-59 mL/min/1 73 square meters)    Stage 3B Moderate CKD (GFR = 30-44 mL/min/1 73 square meters)    Stage 4 Severe CKD (GFR = 15-29 mL/min/1 73 square meters)    Stage 5 End Stage CKD (GFR <15 mL/min/1 73 square meters)  Note: GFR calculation is accurate only with a steady state creatinine    Magnesium [[id number]]  (Normal) Collected: 04/18/22 1306    Lab Status: Final result Specimen: Blood from Arm, Right Updated: 04/18/22 1337     Magnesium 2 0 mg/dL     Sedimentation rate, automated [[id number]]  (Abnormal) Collected: 04/18/22 1306    Lab Status: Final result Specimen: Blood from Arm, Right Updated: 04/18/22 1329     Sed Rate 65 mm/hour     CBC and differential [[id number]]  (Abnormal) Collected: 04/18/22 1306    Lab Status: Final result Specimen: Blood from Arm, Right Updated: 04/18/22 1319     WBC 20 19 Thousand/uL      RBC 4 17 Million/uL      Hemoglobin 13 3 g/dL      Hematocrit 41 7 %       fL      MCH 31 9 pg      MCHC 31 9 g/dL      RDW 13 7 %      MPV 12 3 fL      Platelets 431 Thousands/uL      nRBC 0 /100 WBCs      Neutrophils Relative 74 %      Immat GRANS % 2 %      Lymphocytes Relative 10 %      Monocytes Relative 11 %      Eosinophils Relative 1 %      Basophils Relative 2 %      Neutrophils Absolute 14 84 Thousands/µL      Immature Grans Absolute 0 44 Thousand/uL      Lymphocytes Absolute 2 09 Thousands/µL      Monocytes Absolute 2 30 Thousand/µL      Eosinophils Absolute 0 19 Thousand/µL      Basophils Absolute 0 33 Thousands/µL     UA w Reflex to Microscopic w Reflex to Culture [[id number]]     Lab Status: No result Specimen: Urine                  XR foot 3+ views LEFT   Final Result by Angelo Samaniego MD (04/18 2409)      Soft tissue swelling    No acute osseous abnormality            Workstation performed: [id number]         XR ankle 3+ views LEFT   Final Result by Angelo Samaniego MD (04/18 8029)      Soft tissue swelling  No acute osseous abnormality            Workstation performed: [id number]                    Procedures  ECG 12 Lead Documentation Only    Date/Time: 4/18/2022 12:28 PM  Performed by: Chen Restrepo DO  Authorized by: Chen Restrepo DO     Indications / Diagnosis:  Sepsis  ECG reviewed by me, the ED Provider: yes    Patient location:  ED  Previous ECG:     Previous ECG:  Compared to current    Similarity:  No change    Comparison to cardiac monitor: Yes    Interpretation:     Interpretation: normal    Comments:      Sinus rhythm, rate 70, normal axis, normal intervals, no acute ST/T-wave abnormalities, otherwise unremarkable EKG, unchanged from baseline      CriticalCare Time  Performed by: Chen Restrepo DO  Authorized by: Chen Restrepo DO     Critical care provider statement:     Critical care time (minutes):  30    Critical care time was exclusive of:  Separately billable procedures and treating other patients    Critical care was necessary to treat or prevent imminent or life-threatening deterioration of the following conditions:  Sepsis    Critical care was time spent personally by me on the following activities:  Blood draw for specimens, obtaining history from patient or surrogate, development of treatment plan with patient or surrogate, discussions with consultants, evaluation of patient's response to treatment, examination of patient, review of old charts, re-evaluation of patient's condition, ordering and review of laboratory studies, ordering and review of radiographic studies, interpretation of cardiac output measurements and ordering and performing treatments and interventions             ED Course                                             MDM  Number of Diagnoses or Management Options  Cellulitis of left lower extremity: new and requires workup  Sepsis [facility]): new and requires workup  Diagnosis management comments: Obtain x-ray the left lower extremity, blood work  Give IV fluids, pain medication       Amount and/or Complexity of Data Reviewed  Clinical lab tests: ordered and reviewed  Tests in the radiology section of CPT®: ordered and reviewed  Tests in the medicine section of CPT®: ordered and reviewed  Review and summarize past medical records: yes  Independent visualization of images, tracings, or specimens: yes    Risk of Complications, Morbidity, and/or Mortality  General comments: Patient had leukocytosis on her blood work  Patient was initially tachypneic upon arrival to the emergency department  Patient now triggered SIRS  Subsequent lactic acid and blood cultures were ordered  Patient did not have lactic acidosis  At this time patient may be septic secondary to cellulitis of left lower extremity  X-ray did not show any acute abnormalities  Broad-spectrum antibiotics started and patient admitted for further management  Patient and family agrees that the mesh implants  Patient Progress  Patient progress: stable      Disposition  Final diagnoses:   Sepsis ([facility] [address] )   Cellulitis of left lower extremity     Time reflects when diagnosis was documented in both MDM as applicable and the Disposition within this note     Time User Action Codes Description Comment    4/18/2022  3:58 PM Yeison Hinds Add [A41 [id number]] Sepsis ([facility] [address] )     4/18/2022  3:58 PM Yeison Hinds Add [U50 [id number]] Cellulitis of left lower extremity       ED Disposition     ED Disposition Condition Date/Time Comment    Admit Stable Mon Apr 18, 2022  3:58 PM Case was discussed with Dr Marquita Ding and the patient's admission status was agreed to be Admission Status: inpatient status to the service of Dr Marquita Ding  Follow-up Information    None         Patient's Medications   Discharge Prescriptions    No medications on file       No discharge procedures on file      PDMP Review     None          ED Provider  Electronically Signed by           Richar Melendez DO  04/18/22 [address] [place] [address] DO  04/18/22 1629

## 2022-04-18 NOTE — ASSESSMENT & PLAN NOTE
Lab Results   Component Value Date    EGFR 46 04/18/2022    EGFR 64 01/26/2022    EGFR 58 01/25/2022    CREATININE 1 13 04/18/2022    CREATININE 0 88 01/26/2022    CREATININE 0 95 01/25/2022   Baseline creatinine at 1 with GFR below 60s  Monitor creatinine daily  Limit nephrotoxic agents and renally dose medications

## 2022-04-19 LAB
ANION GAP SERPL CALCULATED.3IONS-SCNC: 6 MMOL/L (ref 4–13)
BASOPHILS # BLD AUTO: 0.32 THOUSANDS/ΜL (ref 0–0.1)
BASOPHILS NFR BLD AUTO: 2 % (ref 0–1)
BUN SERPL-MCNC: 11 MG/DL (ref 5–25)
CALCIUM SERPL-MCNC: 8 MG/DL (ref 8.3–10.1)
CHLORIDE SERPL-SCNC: 107 MMOL/L (ref 100–108)
CO2 SERPL-SCNC: 30 MMOL/L (ref 21–32)
CREAT SERPL-MCNC: 0.95 MG/DL (ref 0.6–1.3)
CRP SERPL QL: 54.6 MG/L
EOSINOPHIL # BLD AUTO: 0.33 THOUSAND/ΜL (ref 0–0.61)
EOSINOPHIL NFR BLD AUTO: 2 % (ref 0–6)
ERYTHROCYTE [DISTWIDTH] IN BLOOD BY AUTOMATED COUNT: 13.7 % (ref 11.6–15.1)
ERYTHROCYTE [SEDIMENTATION RATE] IN BLOOD: 30 MM/HOUR (ref 0–29)
GFR SERPL CREATININE-BSD FRML MDRD: 57 ML/MIN/1.73SQ M
GLUCOSE SERPL-MCNC: 96 MG/DL (ref 65–140)
HCT VFR BLD AUTO: 41.5 % (ref 34.8–46.1)
HGB BLD-MCNC: 12 G/DL (ref 11.5–15.4)
IMM GRANULOCYTES # BLD AUTO: 0.34 THOUSAND/UL (ref 0–0.2)
IMM GRANULOCYTES NFR BLD AUTO: 2 % (ref 0–2)
LYMPHOCYTES # BLD AUTO: 2.08 THOUSANDS/ΜL (ref 0.6–4.47)
LYMPHOCYTES NFR BLD AUTO: 14 % (ref 14–44)
MCH RBC QN AUTO: 31 PG (ref 26.8–34.3)
MCHC RBC AUTO-ENTMCNC: 28.9 G/DL (ref 31.4–37.4)
MCV RBC AUTO: 107 FL (ref 82–98)
MONOCYTES # BLD AUTO: 1.73 THOUSAND/ΜL (ref 0.17–1.22)
MONOCYTES NFR BLD AUTO: 11 % (ref 4–12)
NEUTROPHILS # BLD AUTO: 10.34 THOUSANDS/ΜL (ref 1.85–7.62)
NEUTS SEG NFR BLD AUTO: 69 % (ref 43–75)
NRBC BLD AUTO-RTO: 0 /100 WBCS
PLATELET # BLD AUTO: 253 THOUSANDS/UL (ref 149–390)
PMV BLD AUTO: 12.5 FL (ref 8.9–12.7)
POTASSIUM SERPL-SCNC: 4.1 MMOL/L (ref 3.5–5.3)
RBC # BLD AUTO: 3.87 MILLION/UL (ref 3.81–5.12)
SODIUM SERPL-SCNC: 143 MMOL/L (ref 136–145)
WBC # BLD AUTO: 15.14 THOUSAND/UL (ref 4.31–10.16)

## 2022-04-19 PROCEDURE — 80048 BASIC METABOLIC PNL TOTAL CA: CPT | Performed by: STUDENT IN AN ORGANIZED HEALTH CARE EDUCATION/TRAINING PROGRAM

## 2022-04-19 PROCEDURE — 99232 SBSQ HOSP IP/OBS MODERATE 35: CPT | Performed by: FAMILY MEDICINE

## 2022-04-19 PROCEDURE — 85025 COMPLETE CBC W/AUTO DIFF WBC: CPT | Performed by: STUDENT IN AN ORGANIZED HEALTH CARE EDUCATION/TRAINING PROGRAM

## 2022-04-19 PROCEDURE — 86140 C-REACTIVE PROTEIN: CPT | Performed by: STUDENT IN AN ORGANIZED HEALTH CARE EDUCATION/TRAINING PROGRAM

## 2022-04-19 PROCEDURE — 85652 RBC SED RATE AUTOMATED: CPT | Performed by: STUDENT IN AN ORGANIZED HEALTH CARE EDUCATION/TRAINING PROGRAM

## 2022-04-19 PROCEDURE — 94760 N-INVAS EAR/PLS OXIMETRY 1: CPT

## 2022-04-19 PROCEDURE — 97161 PT EVAL LOW COMPLEX 20 MIN: CPT

## 2022-04-19 PROCEDURE — 94640 AIRWAY INHALATION TREATMENT: CPT

## 2022-04-19 PROCEDURE — 94664 DEMO&/EVAL PT USE INHALER: CPT

## 2022-04-19 RX ORDER — IPRATROPIUM BROMIDE AND ALBUTEROL SULFATE 2.5; .5 MG/3ML; MG/3ML
3 SOLUTION RESPIRATORY (INHALATION)
Status: DISCONTINUED | OUTPATIENT
Start: 2022-04-19 | End: 2022-04-22 | Stop reason: HOSPADM

## 2022-04-19 RX ADMIN — IPRATROPIUM BROMIDE AND ALBUTEROL SULFATE 3 ML: 2.5; .5 SOLUTION RESPIRATORY (INHALATION) at 15:18

## 2022-04-19 RX ADMIN — ACETAMINOPHEN 650 MG: 325 TABLET, FILM COATED ORAL at 05:35

## 2022-04-19 RX ADMIN — ATORVASTATIN CALCIUM 40 MG: 40 TABLET, FILM COATED ORAL at 08:11

## 2022-04-19 RX ADMIN — IPRATROPIUM BROMIDE AND ALBUTEROL SULFATE 3 ML: 2.5; .5 SOLUTION RESPIRATORY (INHALATION) at 11:02

## 2022-04-19 RX ADMIN — APIXABAN 5 MG: 5 TABLET, FILM COATED ORAL at 08:11

## 2022-04-19 RX ADMIN — UMECLIDINIUM BROMIDE AND VILANTEROL TRIFENATATE 1 PUFF: 62.5; 25 POWDER RESPIRATORY (INHALATION) at 08:12

## 2022-04-19 RX ADMIN — Medication 12.5 MG: at 17:38

## 2022-04-19 RX ADMIN — CEFAZOLIN SODIUM 1000 MG: 1 SOLUTION INTRAVENOUS at 11:00

## 2022-04-19 RX ADMIN — CEFAZOLIN SODIUM 1000 MG: 1 SOLUTION INTRAVENOUS at 03:00

## 2022-04-19 RX ADMIN — IPRATROPIUM BROMIDE AND ALBUTEROL SULFATE 3 ML: 2.5; .5 SOLUTION RESPIRATORY (INHALATION) at 20:08

## 2022-04-19 RX ADMIN — FLUTICASONE PROPIONATE 1 PUFF: 110 AEROSOL, METERED RESPIRATORY (INHALATION) at 08:12

## 2022-04-19 RX ADMIN — OXYCODONE HYDROCHLORIDE 2.5 MG: 5 TABLET ORAL at 17:44

## 2022-04-19 RX ADMIN — IPRATROPIUM BROMIDE AND ALBUTEROL SULFATE 3 ML: 2.5; .5 SOLUTION RESPIRATORY (INHALATION) at 03:09

## 2022-04-19 RX ADMIN — FLUTICASONE PROPIONATE 1 PUFF: 110 AEROSOL, METERED RESPIRATORY (INHALATION) at 22:32

## 2022-04-19 RX ADMIN — IPRATROPIUM BROMIDE AND ALBUTEROL SULFATE 3 ML: 2.5; .5 SOLUTION RESPIRATORY (INHALATION) at 07:32

## 2022-04-19 RX ADMIN — Medication 500 MG: at 08:11

## 2022-04-19 RX ADMIN — CEFAZOLIN SODIUM 1000 MG: 1 SOLUTION INTRAVENOUS at 17:38

## 2022-04-19 RX ADMIN — ASPIRIN 81 MG: 81 TABLET, COATED ORAL at 08:11

## 2022-04-19 RX ADMIN — APIXABAN 5 MG: 5 TABLET, FILM COATED ORAL at 17:38

## 2022-04-19 RX ADMIN — Medication 500 MG: at 19:46

## 2022-04-19 NOTE — PROGRESS NOTES
[facility] Practice Progress Note - Enrique De Anda 68 y o  female MRN: [id number]    Unit/Bed#: [address] [place] [address] Encounter: [id number]      Assessment/Plan:  * Sepsis [facility])  Assessment & Plan  Patient met SIRS criteria with tachypnea and elevated white blood cell count of more than 20 with suspected source being cellulitis of left foot and ankle  Patient received vancomycin and ceftriaxone at the ED  Lactic acid was within normal limits  X-ray showed no acute abnormality but with swelling  Blood cultures were collected x2  Procalcitonin within normal limits  · Monitor vital signs  · Monitor blood cultures  · Continue vancomycin and cefazolin  · MRSA swab, consider discontinuing vancomycin if negative  · Monitor for improvement daily by marking cellulitis  History of pulmonary embolus (PE)  Assessment & Plan  History of multiple pulmonary emboli back in January of 2022  She was started on Eliquis at that time  Stage 3 chronic kidney disease [facility])  Assessment & Plan  Lab Results   Component Value Date    EGFR 57 04/19/2022    EGFR 46 04/18/2022    EGFR 64 01/26/2022    CREATININE 0 95 04/19/2022    CREATININE 1 13 04/18/2022    CREATININE 0 88 01/26/2022   Baseline creatinine at 1 with GFR below 60s  Monitor creatinine daily  Limit nephrotoxic agents and renally dose medications  Mixed hyperlipidemia  Assessment & Plan  Continue home Lipitor  CAD (coronary artery disease)  Assessment & Plan  Continue home medication aspirin  Essential hypertension  Assessment & Plan  Overnight blood pressure of 97/43  Will hold lisinopril and Lopressor and restart when appropriate  Centrilobular emphysema ([address] )  Assessment & Plan  Patient is on home oxygen at baseline for this  Her home oxygen requirements are  Continue with home medications Trelegy and albuterol      Subjective:   Patient seen at bedside  She reports she has pain in her foot but is improving    She denies chest pain, abdominal pain, nausea, vomiting, diarrhea  She would like go home as soon as possible  Objective:     Vitals: Blood pressure 110/53, pulse 61, temperature 97 8 °F (36 6 °C), temperature source Oral, resp  rate 20, height 5' 1" (1 549 m), weight 76 4 kg (168 lb 6 4 oz), SpO2 95 %, not currently breastfeeding  ,Body mass index is 31 82 kg/m²  Wt Readings from Last 3 Encounters:   04/18/22 76 4 kg (168 lb 6 4 oz)   03/02/22 76 2 kg (168 lb)   02/08/22 77 6 kg (171 lb)       Intake/Output Summary (Last 24 hours) at 4/19/2022 0815  Last data filed at 4/19/2022 0600  Gross per 24 hour   Intake 2708 75 ml   Output 800 ml   Net 1908 75 ml       Physical Exam  Constitutional:       General: She is awake  Cardiovascular:      Rate and Rhythm: Normal rate and regular rhythm  Heart sounds: Normal heart sounds, S1 normal and S2 normal    Abdominal:      General: Abdomen is flat  Bowel sounds are normal       Palpations: Abdomen is soft  Tenderness: There is no abdominal tenderness  Neurological:      Mental Status: She is alert  Psychiatric:         Behavior: Behavior is cooperative  Slight improvement from yesterday  Continues with pain on palpation    No edema noted on exam       Recent Results (from the past 24 hour(s))   ECG 12 lead    Collection Time: 04/18/22 12:28 PM   Result Value Ref Range    Ventricular Rate 70 BPM    Atrial Rate 70 BPM    CA Interval 168 ms    QRSD Interval 80 ms    QT Interval 388 ms    QTC Interval 419 ms    P Axis 77 degrees    QRS Axis 23 degrees    T Wave Axis 39 degrees   CBC and differential    Collection Time: 04/18/22  1:06 PM   Result Value Ref Range    WBC 20 19 (H) 4 31 - 10 16 Thousand/uL    RBC 4 17 3 81 - 5 12 Million/uL    Hemoglobin 13 3 11 5 - 15 4 g/dL    Hematocrit 41 7 34 8 - 46 1 %     (H) 82 - 98 fL    MCH 31 9 26 8 - 34 3 pg    MCHC 31 9 31 4 - 37 4 g/dL    RDW 13 7 11 6 - 15 1 %    MPV 12 3 8 9 - 12 7 fL    Platelets 911 914 - 492 Thousands/uL    nRBC 0 /100 WBCs    Neutrophils Relative 74 43 - 75 %    Immat GRANS % 2 0 - 2 %    Lymphocytes Relative 10 (L) 14 - 44 %    Monocytes Relative 11 4 - 12 %    Eosinophils Relative 1 0 - 6 %    Basophils Relative 2 (H) 0 - 1 %    Neutrophils Absolute 14 84 (H) 1 85 - 7 62 Thousands/µL    Immature Grans Absolute 0 44 (H) 0 00 - 0 20 Thousand/uL    Lymphocytes Absolute 2 09 0 60 - 4 47 Thousands/µL    Monocytes Absolute 2 30 (H) 0 17 - 1 22 Thousand/µL    Eosinophils Absolute 0 19 0 00 - 0 61 Thousand/µL    Basophils Absolute 0 33 (H) 0 00 - 0 10 Thousands/µL   Comprehensive metabolic panel    Collection Time: 04/18/22  1:06 PM   Result Value Ref Range    Sodium 142 136 - 145 mmol/L    Potassium 4 0 3 5 - 5 3 mmol/L    Chloride 104 100 - 108 mmol/L    CO2 32 21 - 32 mmol/L    ANION GAP 6 4 - 13 mmol/L    BUN 13 5 - 25 mg/dL    Creatinine 1 13 0 60 - 1 30 mg/dL    Glucose 94 65 - 140 mg/dL    Calcium 9 0 8 3 - 10 1 mg/dL    Corrected Calcium 9 8 8 3 - 10 1 mg/dL    AST 23 5 - 45 U/L    ALT 25 12 - 78 U/L    Alkaline Phosphatase 84 46 - 116 U/L    Total Protein 7 3 6 4 - 8 2 g/dL    Albumin 3 0 (L) 3 5 - 5 0 g/dL    Total Bilirubin 0 34 0 20 - 1 00 mg/dL    eGFR 46 ml/min/1 73sq m   Sedimentation rate, automated    Collection Time: 04/18/22  1:06 PM   Result Value Ref Range    Sed Rate 65 (H) 0 - 29 mm/hour   Magnesium    Collection Time: 04/18/22  1:06 PM   Result Value Ref Range    Magnesium 2 0 1 6 - 2 6 mg/dL   Procalcitonin    Collection Time: 04/18/22  1:06 PM   Result Value Ref Range    Procalcitonin 0 05 <=0 25 ng/ml   COVID/FLU/RSV - 2 hour TAT    Collection Time: 04/18/22  1:08 PM    Specimen: Nose; Nares   Result Value Ref Range    SARS-CoV-2 Negative Negative    INFLUENZA A PCR Negative Negative    INFLUENZA B PCR Negative Negative    RSV PCR Negative Negative   Blood culture #1    Collection Time: 04/18/22  2:24 PM    Specimen: Arm, Right; Blood   Result Value Ref Range    Blood Culture Received in Microbiology Lab  Culture in Progress  Lactic acid    Collection Time: 04/18/22  2:29 PM   Result Value Ref Range    LACTIC ACID 0 7 0 5 - 2 0 mmol/L   Blood culture #2    Collection Time: 04/18/22  2:29 PM    Specimen: Arm, Right; Blood   Result Value Ref Range    Blood Culture Received in Microbiology Lab  Culture in Progress      UA w Reflex to Microscopic w Reflex to Culture    Collection Time: 04/18/22  6:06 PM    Specimen: Urine, Clean Catch   Result Value Ref Range    Color, UA Yellow     Clarity, UA Clear     Specific Gravity, UA 1 025 1 000 - 1 030    pH, UA 6 0 5 0, 5 5, 6 0, 6 5, 7 0, 7 5, 8 0, 8 5, 9 0    Leukocytes, UA Negative Negative    Nitrite, UA Negative Negative    Protein, UA Negative Negative mg/dl    Glucose, UA Negative Negative mg/dl    Ketones, UA Negative Negative mg/dl    Urobilinogen, UA 0 2 0 2, 1 0 E U /dl E U /dl    Bilirubin, UA Negative Negative    Blood, UA Small (A) Negative   Urine Microscopic    Collection Time: 04/18/22  6:06 PM   Result Value Ref Range    RBC, UA 4-10 (A) None Seen, 0-1, 1-2, 2-4, 0-5 /hpf    WBC, UA 0-1 None Seen, 0-1, 1-2, 0-5, 2-4 /hpf    Epithelial Cells Occasional None Seen, Occasional /hpf    Bacteria, UA Occasional None Seen, Occasional /hpf   Basic metabolic panel    Collection Time: 04/19/22  5:32 AM   Result Value Ref Range    Sodium 143 136 - 145 mmol/L    Potassium 4 1 3 5 - 5 3 mmol/L    Chloride 107 100 - 108 mmol/L    CO2 30 21 - 32 mmol/L    ANION GAP 6 4 - 13 mmol/L    BUN 11 5 - 25 mg/dL    Creatinine 0 95 0 60 - 1 30 mg/dL    Glucose 96 65 - 140 mg/dL    Calcium 8 0 (L) 8 3 - 10 1 mg/dL    eGFR 57 ml/min/1 73sq m   CBC and differential    Collection Time: 04/19/22  5:32 AM   Result Value Ref Range    WBC 15 14 (H) 4 31 - 10 16 Thousand/uL    RBC 3 87 3 81 - 5 12 Million/uL    Hemoglobin 12 0 11 5 - 15 4 g/dL    Hematocrit 41 5 34 8 - 46 1 %     (H) 82 - 98 fL    MCH 31 0 26 8 - 34 3 pg    MCHC 28 9 (L) 31 4 - 37 4 g/dL    RDW 13 7 11 6 - 15 1 % MPV 12 5 8 9 - 12 7 fL    Platelets 178 309 - 135 Thousands/uL    nRBC 0 /100 WBCs    Neutrophils Relative 69 43 - 75 %    Immat GRANS % 2 0 - 2 %    Lymphocytes Relative 14 14 - 44 %    Monocytes Relative 11 4 - 12 %    Eosinophils Relative 2 0 - 6 %    Basophils Relative 2 (H) 0 - 1 %    Neutrophils Absolute 10 34 (H) 1 85 - 7 62 Thousands/µL    Immature Grans Absolute 0 34 (H) 0 00 - 0 20 Thousand/uL    Lymphocytes Absolute 2 08 0 60 - 4 47 Thousands/µL    Monocytes Absolute 1 73 (H) 0 17 - 1 22 Thousand/µL    Eosinophils Absolute 0 33 0 00 - 0 61 Thousand/µL    Basophils Absolute 0 32 (H) 0 00 - 0 10 Thousands/µL   C-reactive protein    Collection Time: 04/19/22  5:32 AM   Result Value Ref Range    CRP 54 6 (H) <3 0 mg/L       Current Facility-Administered Medications   Medication Dose Route Frequency Provider Last Rate Last Admin    acetaminophen (TYLENOL) tablet 650 mg  650 mg Oral Q6H PRN Gee Garcia, DO   650 mg at 04/19/22 0535    apixaban (ELIQUIS) tablet 5 mg  5 mg Oral BID Gee Garcia, DO   5 mg at 04/19/22 0811    aspirin (ECOTRIN LOW STRENGTH) EC tablet 81 mg  81 mg Oral Daily Gee Garcia, DO   81 mg at 04/19/22 0811    atorvastatin (LIPITOR) tablet 40 mg  40 mg Oral Daily Gee Garcia, DO   40 mg at 04/19/22 0811    ceFAZolin (ANCEF) IVPB (premix in dextrose) 1,000 mg 50 mL  1,000 mg Intravenous Q8H Gee Garcia, DO   Stopped at 04/19/22 0330    fluticasone (FLOVENT HFA) 110 MCG/ACT inhaler 1 puff  1 puff Inhalation Q12H [address] ul Jey Garcia, DO   1 puff at 04/19/22 0812    ipratropium-albuterol (DUO-NEB) 0 5-2 5 mg/3 mL inhalation solution 3 mL  3 mL Nebulization Q4H Gee Garcia, DO   3 mL at 04/19/22 0732    oxyCODONE (ROXICODONE) IR tablet 2 5 mg  2 5 mg Oral Q4H PRN Seul Jey Garcia, DO        sodium chloride 0 9 % infusion  125 mL/hr Intravenous Continuous Gee Garcia,  mL/hr at 04/18/22 2335 125 mL/hr at 04/18/22 2335    umeclidinium-vilanterol (ANORO ELLIPTA) 62 5-25 MCG/INH inhaler 1 puff  1 puff Inhalation Daily Gee Garcia DO   1 puff at 04/19/22 1767    vancomycin (VANCOCIN) 500 mg in sodium chloride 0 9% 100 mL IVPB  10 mg/kg (Adjusted) Intravenous Q12H Gee Garcia DO   500 mg at 04/19/22 7820       Invasive Devices  Report    Peripheral Intravenous Line            Peripheral IV 04/18/22 Right Antecubital <1 day    Peripheral IV 04/18/22 Right;Ventral (anterior) Wrist <1 day                Lab, Imaging and other studies: I have personally reviewed pertinent reports      VTE Pharmacologic Prophylaxis:  Eliquis  VTE Mechanical Prophylaxis: sequential compression device    James Munguia MD

## 2022-04-19 NOTE — ASSESSMENT & PLAN NOTE
Patient met SIRS criteria with tachypnea and elevated white blood cell count of more than 20 with suspected source being cellulitis of left foot and ankle  Patient received vancomycin and ceftriaxone at the ED  Lactic acid was within normal limits  X-ray showed no acute abnormality but with swelling  Blood cultures were collected x2, negative at 72 hours  Procalcitonin within normal limits  Started on vancomycin and cefazolin  MRSA culture returned negative  Vancomycin discontinued  Continued on cefazolin and completed 4 days of antibiotic treatment and was transitioned to Keflex p o  to complete a total of 7 days of antibiotic treatment  On day of discharge patient's cellulitis has significantly improved and her pain had improved

## 2022-04-19 NOTE — RESPIRATORY THERAPY NOTE
RT Protocol Note  Makenzie Espinoza 68 y o  female MRN: [id number]  Unit/Bed#: [address] [place] [address]01 Encounter: [id number]    Assessment    Principal Problem:    Sepsis ([address] )  Active Problems:    Centrilobular emphysema ([address] )    Essential hypertension    CAD (coronary artery disease)    Mixed hyperlipidemia    Stage 3 chronic kidney disease ([address] )    Acute pulmonary embolism without acute cor pulmonale ([facility] )      Home Pulmonary Medications:ALbuterol  Home Devices/Therapy: Home O2    Past Medical History:   Diagnosis Date    Breast lump     [facility]     COPD [facility]chronic obstructive pulmonary disease) [facility]     Diverticulitis     Emphysema of lung [facility]     History of chronic obstructive lung disease     Non-productive cough     SOBOE (shortness of breath on exertion)      Social History     Socioeconomic History    Marital status:       Spouse name: None    Number of children: None    Years of education: None    Highest education level: None   Occupational History    None   Tobacco Use    Smoking status: Former Smoker     Packs/day: 1 50     Years: 55 00     Pack years: 82 50     Quit date: 2010     Years since quittin 8    Smokeless tobacco: Never Used    Tobacco comment: ex cigarette smoker   Vaping Use    Vaping Use: Never used   Substance and Sexual Activity    Alcohol use: Not Currently    Drug use: No    Sexual activity: Not Currently   Other Topics Concern    None   Social History Narrative    None     Social Determinants of Health     Financial Resource Strain: Not on file   Food Insecurity: Not on file   Transportation Needs: Not on file   Physical Activity: Not on file   Stress: Not on file   Social Connections: Not on file   Intimate Partner Violence: Not on file   Housing Stability: Not on file       Subjective         Objective    Physical Exam:   Assessment Type: Pre-treatment  General Appearance: Awake,Alert  Respiratory Pattern: Normal  Chest Assessment: Chest expansion symmetrical  Bilateral Breath Sounds: Diminished  Cough: None  O2 Device: NC    Vitals:  Blood pressure 159/75, pulse 64, temperature 98 1 °F (36 7 °C), temperature source Oral, resp  rate 20, height 5' 1" (1 549 m), weight 76 4 kg (168 lb 6 4 oz), SpO2 97 %, not currently breastfeeding            O2 Device: NC     Plan    Respiratory Plan: Home Bronchodilator Patient pathway        Resp Comments: no distress

## 2022-04-19 NOTE — PROGRESS NOTES
Vancomycin Assessment    Dipesh Holloway is a 68 y o  female who is currently receiving vancomycin 1250 mg IV q24h for skin-soft tissue infection     Relevant clinical data and objective history reviewed:  Creatinine   Date Value Ref Range Status   04/19/2022 0 95 0 60 - 1 30 mg/dL Final     Comment:     Standardized to IDMS reference method   04/18/2022 1 13 0 60 - 1 30 mg/dL Final     Comment:     Standardized to IDMS reference method   01/26/2022 0 88 0 60 - 1 30 mg/dL Final     Comment:     Standardized to IDMS reference method     /53 (BP Location: Left arm)   Pulse 61   Temp 97 8 °F (36 6 °C) (Oral)   Resp 20   Ht 5' 1" (1 549 m)   Wt 76 4 kg (168 lb 6 4 oz)   SpO2 95%   BMI 31 82 kg/m²   I/O last 3 completed shifts: In: 1856 7 [P O :240; I V :1316 7; IV Piggyback:300]  Out: 800 [Urine:800]  Lab Results   Component Value Date/Time    BUN 11 04/19/2022 05:32 AM    WBC 15 14 (H) 04/19/2022 05:32 AM    HGB 12 0 04/19/2022 05:32 AM    HCT 41 5 04/19/2022 05:32 AM     (H) 04/19/2022 05:32 AM     04/19/2022 05:32 AM     Temp Readings from Last 3 Encounters:   04/19/22 97 8 °F (36 6 °C) (Oral)   03/02/22 99 °F (37 2 °C) (Tympanic)   02/08/22 97 8 °F (36 6 °C) (Temporal)     Vancomycin Days of Therapy: 2    Assessment/Plan  The patient is currently on vancomycin utilizing scheduled dosing based on adjusted body weight (due to obesity)  Baseline risks associated with therapy include: pre-existing renal impairment, concomitant nephrotoxic medications, and advanced age  The patient is currently receiving 1250 mg IV q24h and after clinical evaluation will be changed to 500 mg IV q12h  Pharmacy will also follow closely for s/sx of nephrotoxicity, infusion reactions, and appropriateness of therapy  BMP and CBC will be ordered per protocol  Plan for trough as patient approaches steady state, prior to the 4th  dose at approximately 07:30 on 4/20/2022    Due to infection severity, will target a trough of 15-20 (appropriate for most indications)   Pharmacy will continue to follow the patients culture results and clinical progress daily      Ashia Liz, Pharmacist

## 2022-04-19 NOTE — ASSESSMENT & PLAN NOTE
Lab Results   Component Value Date    EGFR 54 04/20/2022    EGFR 57 04/19/2022    EGFR 46 04/18/2022    CREATININE 1 00 04/20/2022    CREATININE 0 95 04/19/2022    CREATININE 1 13 04/18/2022   Baseline creatinine at 1 with GFR below 60s  Monitor creatinine daily  Limit nephrotoxic agents and renally dose medications

## 2022-04-19 NOTE — OCCUPATIONAL THERAPY NOTE
Occupational Therapy Screen       04/19/22 1546   Note Type   Note type Screen   Additional Comments Patient currently independent in all ADLs and mobility at this time; no skilled inpatient OT services indicated at this time   21 Liset Levine Number  Francisco Bonner, OTR/L [id number]

## 2022-04-19 NOTE — ASSESSMENT & PLAN NOTE
Patient is on home oxygen at baseline for this    Her home oxygen requirements are 3 L  Continue with home medications Trelegy and albuterol

## 2022-04-19 NOTE — CASE MANAGEMENT
Case Management Discharge Planning Note    Patient name Makenzie Espinoza  Location [address] [place] [address] MRN [id number]  : 1945 Date 2022       Current Admission Date: 2022  Current Admission Diagnosis:Sepsis [facility])   Patient Active Problem List    Diagnosis Date Noted    Sepsis ([facility] ) 2022    History of pulmonary embolus (PE) 2022    COVID-19 vaccination declined 2022    Former smoker 12/10/2018    Centrilobular emphysema ([facility] ) 2018    Essential hypertension 2018    CAD (coronary artery disease) 2018    Mixed hyperlipidemia 2018    Stage 3 chronic kidney disease ([facility] ) 2018      LOS (days): 1  Geometric Mean LOS (GMLOS) (days): 4 80  Days to GMLOS:3 8     OBJECTIVE:  Risk of Unplanned Readmission Score: 12         Current admission status: Inpatient   Preferred Pharmacy:   [facility] [address] [facility] [address]  [address]  Phone: [phone] Fax: [phone]    Primary Care Provider: Lebron Hubbard DO    Primary Insurance: MEDICARE  Secondary Insurance: AARP    DISCHARGE DETAILS:    Discharge planning discussed with[de-identified] Patient  Freedom of Choice: Yes     CM contacted family/caregiver?: No- see comments (DECLINED)  Were Treatment Team discharge recommendations reviewed with patient/caregiver?: Yes  Did patient/caregiver verbalize understanding of patient care needs?: Yes  Were patient/caregiver advised of the risks associated with not following Treatment Team discharge recommendations?: Yes    Requested  [facility]         Is the patient interested in [address] at discharge?: No    DME Referral Provided  Referral made for DME?: No    Would you like to participate in our [facility] service program?  : No - Declined    Treatment Team Recommendation: Home  Discharge Destination Plan[de-identified] Home  Transport at Discharge : Family     Additional Comments: Patient declining any needs at this time   CM department will continue to follow for any DCP needs

## 2022-04-19 NOTE — PLAN OF CARE
Problem: Potential for Falls  Goal: Patient will remain free of falls  Description: INTERVENTIONS:  - Educate patient/family on patient safety including physical limitations  - Instruct patient to call for assistance with activity   - Consult OT/PT to assist with strengthening/mobility   - Keep Call bell within reach  - Keep bed low and locked with side rails adjusted as appropriate  - Keep care items and personal belongings within reach  - Initiate and maintain comfort rounds  - Make Fall Risk Sign visible to staff  - Offer Toileting every 2 Hours, in advance of need  - Initiate/Maintain 1alarm  - Obtain necessary fall risk management equipment: call bell use, bed alamr  - Apply yellow socks and bracelet for high fall risk patients  - Consider moving patient to room near nurses station  Outcome: Progressing     Problem: PAIN - ADULT  Goal: Verbalizes/displays adequate comfort level or baseline comfort level  Description: Interventions:  - Encourage patient to monitor pain and request assistance  - Assess pain using appropriate pain scale  - Administer analgesics based on type and severity of pain and evaluate response  - Implement non-pharmacological measures as appropriate and evaluate response  - Consider cultural and social influences on pain and pain management  - Notify physician/advanced practitioner if interventions unsuccessful or patient reports new pain  Outcome: Progressing     Problem: INFECTION - ADULT  Goal: Absence or prevention of progression during hospitalization  Description: INTERVENTIONS:  - Assess and monitor for signs and symptoms of infection  - Monitor lab/diagnostic results  - Monitor all insertion sites, i e  indwelling lines, tubes, and drains  - Monitor endotracheal if appropriate and nasal secretions for changes in amount and color  - [place] appropriate cooling/warming therapies per order  - Administer medications as ordered  - Instruct and encourage patient and family to use good hand hygiene technique  - Identify and instruct in appropriate isolation precautions for identified infection/condition  Outcome: Progressing  Goal: Absence of fever/infection during neutropenic period  Description: INTERVENTIONS:  - Monitor WBC    Outcome: Progressing     Problem: SAFETY ADULT  Goal: Patient will remain free of falls  Description: INTERVENTIONS:  - Educate patient/family on patient safety including physical limitations  - Instruct patient to call for assistance with activity   - Consult OT/PT to assist with strengthening/mobility   - Keep Call bell within reach  - Keep bed low and locked with side rails adjusted as appropriate  - Keep care items and personal belongings within reach  - Initiate and maintain comfort rounds  - Make Fall Risk Sign visible to staff  - Offer Toileting every 3 Hours, in advance of need  - Initiate/Maintain 3alarm  - Obtain necessary fall risk management equipment: 3  - Apply yellow socks and bracelet for high fall risk patients  - Consider moving patient to room near nurses station  Outcome: Progressing  Goal: Maintain or return to baseline ADL function  Description: INTERVENTIONS:  -  Assess patient's ability to carry out ADLs; assess patient's baseline for ADL function and identify physical deficits which impact ability to perform ADLs (bathing, care of mouth/teeth, toileting, grooming, dressing, etc )  - Assess/evaluate cause of self-care deficits   - Assess range of motion  - Assess patient's mobility; develop plan if impaired  - Assess patient's need for assistive devices and provide as appropriate  - Encourage maximum independence but intervene and supervise when necessary  - Involve family in performance of ADLs  - Assess for home care needs following discharge   - Consider OT consult to assist with ADL evaluation and planning for discharge  - Provide patient education as appropriate  Outcome: Progressing  Goal: Maintains/Returns to pre admission functional level  Description: INTERVENTIONS:  - Perform BMAT or MOVE assessment daily    - Set and communicate daily mobility goal to care team and patient/family/caregiver  - Collaborate with rehabilitation services on mobility goals if consulted  - Perform Range of Motion 3 times a day  - Reposition patient every 3 hours    - Dangle patient 3 times a day  - Stand patient 3 times a day  - Ambulate patient 3 times a day  - Out of bed to chair 3 times a day   - Out of bed for meals 3 times a day  - Out of bed for toileting  - Record patient progress and toleration of activity level   Outcome: Progressing     Problem: DISCHARGE PLANNING  Goal: Discharge to home or other facility with appropriate resources  Description: INTERVENTIONS:  - Identify barriers to discharge w/patient and caregiver  - Arrange for needed discharge resources and transportation as appropriate  - Identify discharge learning needs (meds, wound care, etc )  - Arrange for interpretive services to assist at discharge as needed  - Refer to Case Management Department for coordinating discharge planning if the patient needs post-hospital services based on physician/advanced practitioner order or complex needs related to functional status, cognitive ability, or social support system  Outcome: Progressing     Problem: RESPIRATORY - ADULT  Goal: Achieves optimal ventilation and oxygenation  Description: INTERVENTIONS:  - Assess for changes in respiratory status  - Assess for changes in mentation and behavior  - Position to facilitate oxygenation and minimize respiratory effort  - Oxygen administered by appropriate delivery if ordered  - Initiate smoking cessation education as indicated  - Encourage broncho-pulmonary hygiene including cough, deep breathe, Incentive Spirometry  - Assess the need for suctioning and aspirate as needed  - Assess and instruct to report SOB or any respiratory difficulty  - Respiratory Therapy support as indicated  Outcome: Progressing Problem: MOBILITY - ADULT  Goal: Maintain or return to baseline ADL function  Description: INTERVENTIONS:  -  Assess patient's ability to carry out ADLs; assess patient's baseline for ADL function and identify physical deficits which impact ability to perform ADLs (bathing, care of mouth/teeth, toileting, grooming, dressing, etc )  - Assess/evaluate cause of self-care deficits   - Assess range of motion  - Assess patient's mobility; develop plan if impaired  - Assess patient's need for assistive devices and provide as appropriate  - Encourage maximum independence but intervene and supervise when necessary  - Involve family in performance of ADLs  - Assess for home care needs following discharge   - Consider OT consult to assist with ADL evaluation and planning for discharge  - Provide patient education as appropriate  Outcome: Progressing  Goal: Maintains/Returns to pre admission functional level  Description: INTERVENTIONS:  - Perform BMAT or MOVE assessment daily    - Set and communicate daily mobility goal to care team and patient/family/caregiver  - Collaborate with rehabilitation services on mobility goals if consulted  - Perform Range of Motion 3 times a day  - Reposition patient every 3 hours    - Dangle patient 3 times a day  - Stand patient 3 times a day  - Ambulate patient 3 times a day  - Out of bed to chair 3 times a day   - Out of bed for meals 3 times a day  - Out of bed for toileting  - Record patient progress and toleration of activity level   Outcome: Progressing

## 2022-04-19 NOTE — PHYSICAL THERAPY NOTE
PT EVALUATION     04/19/22 3890   Note Type   Note type Evaluation   Pain Assessment   Pain Assessment Tool Resendiz-Baker FACES   Resendiz-Baker FACES Pain Rating 2  (Left lower extremity area)   Restrictions/Precautions   Other Precautions   (none)   Home Living   Type of Home House   Home Layout Two level   Home Equipment   (None/home O2)   Additional Comments Patient independent without assistive device prior to admission   Prior Function   Lives With Daughter   Receives Help From Family   ADL Assistance Independent   IADLs Needs assistance   Comments Patient independent with laundry ADLs and daughter assists with cooking due to oxygen use   General   Additional Pertinent History Chart reviewed, patient admitted with sepsis    Patient now demonstrating independent gait transfers and ADLs in her hospital room and does not require skilled PT OT services at this time   Family/Caregiver Present No   Cognition   Overall Cognitive Status WFL   Arousal/Participation Cooperative   Orientation Level Oriented X4   Following Commands Follows all commands and directions without difficulty   Subjective   Subjective Patient reports independence despite left lower extremity pain   RUE Assessment   RUE Assessment WFL   LUE Assessment   LUE Assessment WFL   RLE Assessment   RLE Assessment WFL   LLE Assessment   LLE Assessment WFL   Coordination   Movements are Fluid and Coordinated 1   Bed Mobility   Supine to Sit 7  Independent   Sit to Supine 7  Independent   Transfers   Sit to Stand 7  Independent   Stand to Sit 7  Independent   Ambulation/Elevation   Gait Assistance 7  Independent   Assistive Device   (None)   Distance Unlimited distances in her hospital room   Balance   Static Sitting Good   Dynamic Sitting Good   Static Standing Good   Dynamic Standing Good   Ambulatory Good   Activity Tolerance   Activity Tolerance Patient tolerated treatment well   Nurse Made Aware Yes   Assessment   Assessment Patient seen for Physical Therapy evaluation  Patient admitted with Sepsis ([facility] [address] )  Comorbidities affecting patient's physical performance includecentrilobular emphysema, hypertension, CAD, hyperlipidemia, stage III CKD, and history of pulmonary embolism   Patient now presents is independent with gait transfers and ADLs and does not require skilled OT PT services at this time  Personal factors affecting patient at time of initial evaluation include: lives in Two story house, inability to navigate community distances, inability to navigate level surfaces without external assistance, inability to perform dynamic tasks in community, limited home support, inability to perform physical activity and inability to perform IADLS   Prior to admission, patient was independent with functional mobility without assistive device, independent with ADLS, requiring assist for IADLS, living in a multi-level home, ambulating household distance, ambulating community distances and home with family assist   Please find objective findings from Physical Therapy assessment regarding body systems outlined above with impairments and limitations including no new functional deficits  The Barthel Index was used as a functional outcome tool presenting with a score of Barthel Index Score: 100 today indicating no limitations of functional mobility and ADLS  Patient's clinical presentation is currently stable  as seen in patient's presentation of no new functional deficits  As demonstrated by objective findings, the assigned level of complexity for this evaluation is low  The patient's AM-PAC Basic Mobility Inpatient Short Form Raw Score is 24  A Raw score of greater than 16 suggests the patient may benefit from discharge to home  Please also refer to the recommendation of the Physical Therapist for safe discharge planning      Goals   Patient Goals To go home   STG Expiration Date   (NA)   LTG Expiration Date   (NA)   Recommendation   PT Discharge Recommendation No rehabilitation needs   AM-PAC Basic Mobility Inpatient   Turning in Bed Without Bedrails 4   Lying on Back to Sitting on Edge of Flat Bed 4   Moving Bed to Chair 4   Standing Up From Chair 4   Walk in Room 4   Climb 3-5 Stairs 4   Basic Mobility Inpatient Raw Score 24   Basic Mobility Standardized Score 57 68   Highest Level Of Mobility   JH-HLM Goal 8: Walk 250 feet or more   JH-HLM Highest Level of Mobility 8: Walk 250 feet ot more   JH-HLM Goal Achieved Yes   Barthel Index   Feeding 10   Bathing 5   Grooming Score 5   Dressing Score 10   Bladder Score 10   Bowels Score 10   Toilet Use Score 10   Transfers (Bed/Chair) Score 15   Mobility (Level Surface) Score 15   Stairs Score 10   Barthel Index Score 708   Licensure   [place] License Number  Juvenal Pugaon PT [id number]

## 2022-04-20 ENCOUNTER — APPOINTMENT (INPATIENT)
Dept: RADIOLOGY | Facility: HOSPITAL | Age: 77
DRG: 872 | End: 2022-04-20
Payer: MEDICARE

## 2022-04-20 LAB
ANION GAP SERPL CALCULATED.3IONS-SCNC: 8 MMOL/L (ref 4–13)
BASO STIPL BLD QL SMEAR: PRESENT
BASOPHILS # BLD MANUAL: 0.48 THOUSAND/UL (ref 0–0.1)
BASOPHILS NFR MAR MANUAL: 3 % (ref 0–1)
BUN SERPL-MCNC: 10 MG/DL (ref 5–25)
CALCIUM SERPL-MCNC: 8.4 MG/DL (ref 8.3–10.1)
CHLORIDE SERPL-SCNC: 107 MMOL/L (ref 100–108)
CO2 SERPL-SCNC: 30 MMOL/L (ref 21–32)
CREAT SERPL-MCNC: 1 MG/DL (ref 0.6–1.3)
EOSINOPHIL # BLD MANUAL: 0.16 THOUSAND/UL (ref 0–0.4)
EOSINOPHIL NFR BLD MANUAL: 1 % (ref 0–6)
ERYTHROCYTE [DISTWIDTH] IN BLOOD BY AUTOMATED COUNT: 13.5 % (ref 11.6–15.1)
ERYTHROCYTE [SEDIMENTATION RATE] IN BLOOD: 67 MM/HOUR (ref 0–29)
GFR SERPL CREATININE-BSD FRML MDRD: 54 ML/MIN/1.73SQ M
GLUCOSE SERPL-MCNC: 105 MG/DL (ref 65–140)
HCT VFR BLD AUTO: 41.8 % (ref 34.8–46.1)
HGB BLD-MCNC: 12.6 G/DL (ref 11.5–15.4)
LG PLATELETS BLD QL SMEAR: PRESENT
LYMPHOCYTES # BLD AUTO: 15 % (ref 14–44)
LYMPHOCYTES # BLD AUTO: 2.38 THOUSAND/UL (ref 0.6–4.47)
MCH RBC QN AUTO: 31.7 PG (ref 26.8–34.3)
MCHC RBC AUTO-ENTMCNC: 30.1 G/DL (ref 31.4–37.4)
MCV RBC AUTO: 105 FL (ref 82–98)
MONOCYTES # BLD AUTO: 0.16 THOUSAND/UL (ref 0–1.22)
MONOCYTES NFR BLD: 1 % (ref 4–12)
MRSA NOSE QL CULT: NORMAL
NEUTROPHILS # BLD MANUAL: 11.57 THOUSAND/UL (ref 1.85–7.62)
NEUTS SEG NFR BLD AUTO: 73 % (ref 43–75)
PLATELET # BLD AUTO: 313 THOUSANDS/UL (ref 149–390)
PLATELET BLD QL SMEAR: ADEQUATE
PMV BLD AUTO: 11.7 FL (ref 8.9–12.7)
POTASSIUM SERPL-SCNC: 4.7 MMOL/L (ref 3.5–5.3)
PROMYELOCYTES NFR BLD MANUAL: 3 % (ref 0–0)
RBC # BLD AUTO: 3.98 MILLION/UL (ref 3.81–5.12)
RBC MORPH BLD: PRESENT
SODIUM SERPL-SCNC: 145 MMOL/L (ref 136–145)
URATE SERPL-MCNC: 3.7 MG/DL (ref 2–6.8)
VANCOMYCIN TROUGH SERPL-MCNC: 9.9 UG/ML (ref 10–20)
VARIANT LYMPHS # BLD AUTO: 4 %
WBC # BLD AUTO: 15.85 THOUSAND/UL (ref 4.31–10.16)

## 2022-04-20 PROCEDURE — 99232 SBSQ HOSP IP/OBS MODERATE 35: CPT | Performed by: FAMILY MEDICINE

## 2022-04-20 PROCEDURE — 84145 PROCALCITONIN (PCT): CPT | Performed by: FAMILY MEDICINE

## 2022-04-20 PROCEDURE — 85652 RBC SED RATE AUTOMATED: CPT | Performed by: STUDENT IN AN ORGANIZED HEALTH CARE EDUCATION/TRAINING PROGRAM

## 2022-04-20 PROCEDURE — 93971 EXTREMITY STUDY: CPT

## 2022-04-20 PROCEDURE — 80048 BASIC METABOLIC PNL TOTAL CA: CPT | Performed by: STUDENT IN AN ORGANIZED HEALTH CARE EDUCATION/TRAINING PROGRAM

## 2022-04-20 PROCEDURE — 94640 AIRWAY INHALATION TREATMENT: CPT

## 2022-04-20 PROCEDURE — 84550 ASSAY OF BLOOD/URIC ACID: CPT | Performed by: FAMILY MEDICINE

## 2022-04-20 PROCEDURE — 93971 EXTREMITY STUDY: CPT | Performed by: SURGERY

## 2022-04-20 PROCEDURE — 80202 ASSAY OF VANCOMYCIN: CPT | Performed by: STUDENT IN AN ORGANIZED HEALTH CARE EDUCATION/TRAINING PROGRAM

## 2022-04-20 PROCEDURE — 85007 BL SMEAR W/DIFF WBC COUNT: CPT | Performed by: STUDENT IN AN ORGANIZED HEALTH CARE EDUCATION/TRAINING PROGRAM

## 2022-04-20 PROCEDURE — 94760 N-INVAS EAR/PLS OXIMETRY 1: CPT

## 2022-04-20 PROCEDURE — 85027 COMPLETE CBC AUTOMATED: CPT | Performed by: STUDENT IN AN ORGANIZED HEALTH CARE EDUCATION/TRAINING PROGRAM

## 2022-04-20 RX ORDER — CEFAZOLIN SODIUM 2 G/50ML
2000 SOLUTION INTRAVENOUS EVERY 8 HOURS
Status: DISCONTINUED | OUTPATIENT
Start: 2022-04-21 | End: 2022-04-22 | Stop reason: HOSPADM

## 2022-04-20 RX ORDER — VANCOMYCIN HYDROCHLORIDE 1 G/200ML
15 INJECTION, SOLUTION INTRAVENOUS EVERY 12 HOURS
Status: DISCONTINUED | OUTPATIENT
Start: 2022-04-20 | End: 2022-04-20

## 2022-04-20 RX ADMIN — ACETAMINOPHEN 650 MG: 325 TABLET, FILM COATED ORAL at 02:15

## 2022-04-20 RX ADMIN — APIXABAN 5 MG: 5 TABLET, FILM COATED ORAL at 08:23

## 2022-04-20 RX ADMIN — SODIUM CHLORIDE 125 ML/HR: 0.9 INJECTION, SOLUTION INTRAVENOUS at 02:23

## 2022-04-20 RX ADMIN — IPRATROPIUM BROMIDE AND ALBUTEROL SULFATE 3 ML: 2.5; .5 SOLUTION RESPIRATORY (INHALATION) at 19:35

## 2022-04-20 RX ADMIN — CEFAZOLIN SODIUM 1000 MG: 1 SOLUTION INTRAVENOUS at 10:56

## 2022-04-20 RX ADMIN — IPRATROPIUM BROMIDE AND ALBUTEROL SULFATE 3 ML: 2.5; .5 SOLUTION RESPIRATORY (INHALATION) at 13:19

## 2022-04-20 RX ADMIN — ATORVASTATIN CALCIUM 40 MG: 40 TABLET, FILM COATED ORAL at 08:23

## 2022-04-20 RX ADMIN — VANCOMYCIN HYDROCHLORIDE 1000 MG: 1 INJECTION, SOLUTION INTRAVENOUS at 09:32

## 2022-04-20 RX ADMIN — OXYCODONE HYDROCHLORIDE 2.5 MG: 5 TABLET ORAL at 06:23

## 2022-04-20 RX ADMIN — ASPIRIN 81 MG: 81 TABLET, COATED ORAL at 08:23

## 2022-04-20 RX ADMIN — CEFAZOLIN SODIUM 1000 MG: 1 SOLUTION INTRAVENOUS at 02:15

## 2022-04-20 RX ADMIN — CEFAZOLIN SODIUM 1000 MG: 1 SOLUTION INTRAVENOUS at 17:19

## 2022-04-20 RX ADMIN — APIXABAN 5 MG: 5 TABLET, FILM COATED ORAL at 17:18

## 2022-04-20 RX ADMIN — FLUTICASONE PROPIONATE 1 PUFF: 110 AEROSOL, METERED RESPIRATORY (INHALATION) at 08:23

## 2022-04-20 RX ADMIN — FLUTICASONE PROPIONATE 1 PUFF: 110 AEROSOL, METERED RESPIRATORY (INHALATION) at 21:20

## 2022-04-20 RX ADMIN — Medication 12.5 MG: at 17:18

## 2022-04-20 RX ADMIN — SODIUM CHLORIDE 125 ML/HR: 0.9 INJECTION, SOLUTION INTRAVENOUS at 21:20

## 2022-04-20 RX ADMIN — ACETAMINOPHEN 650 MG: 325 TABLET, FILM COATED ORAL at 13:40

## 2022-04-20 RX ADMIN — SODIUM CHLORIDE 125 ML/HR: 0.9 INJECTION, SOLUTION INTRAVENOUS at 12:10

## 2022-04-20 RX ADMIN — UMECLIDINIUM BROMIDE AND VILANTEROL TRIFENATATE 1 PUFF: 62.5; 25 POWDER RESPIRATORY (INHALATION) at 08:23

## 2022-04-20 RX ADMIN — IPRATROPIUM BROMIDE AND ALBUTEROL SULFATE 3 ML: 2.5; .5 SOLUTION RESPIRATORY (INHALATION) at 07:30

## 2022-04-20 RX ADMIN — Medication 12.5 MG: at 08:23

## 2022-04-20 NOTE — PROGRESS NOTES
Vancomycin Assessment    Victor M Alvarado is a 68 y o  female who is currently receiving vancomycin 500 mg IV q12h for skin-soft tissue infection     Relevant clinical data and objective history reviewed:  Creatinine   Date Value Ref Range Status   04/20/2022 1 00 0 60 - 1 30 mg/dL Final     Comment:     Standardized to IDMS reference method   04/19/2022 0 95 0 60 - 1 30 mg/dL Final     Comment:     Standardized to IDMS reference method   04/18/2022 1 13 0 60 - 1 30 mg/dL Final     Comment:     Standardized to IDMS reference method     /63 (BP Location: Left arm)   Pulse 75   Temp 97 8 °F (36 6 °C) (Oral)   Resp 18   Ht 5' 1" (1 549 m)   Wt 76 4 kg (168 lb 6 4 oz)   SpO2 95%   BMI 31 82 kg/m²   I/O last 3 completed shifts: In: 1425 [I V :1375; IV Piggyback:50]  Out: 1200 [Urine:1200]  Lab Results   Component Value Date/Time    BUN 10 04/20/2022 08:07 AM    WBC 15 85 (H) 04/20/2022 08:07 AM    HGB 12 6 04/20/2022 08:07 AM    HCT 41 8 04/20/2022 08:07 AM     (H) 04/20/2022 08:07 AM     04/20/2022 08:07 AM     Temp Readings from Last 3 Encounters:   04/20/22 97 8 °F (36 6 °C) (Oral)   03/02/22 99 °F (37 2 °C) (Tympanic)   02/08/22 97 8 °F (36 6 °C) (Temporal)     Vancomycin Days of Therapy: 3    Assessment/Plan  The patient is currently on vancomycin utilizing scheduled dosing  Baseline risks associated with therapy include: pre-existing renal impairment, concomitant nephrotoxic medications, and advanced age  The patient is receiving 500 mg IV q12h with the most recent vancomycin level being not at steady-state and sub-therapeutic trough at 9 9 based on a goal of 15-20 (appropriate for most indications) ; therefore, after clinical evaluation will be changed to 1000 mg IV q12h   Pharmacy will continue to follow closely for s/sx of nephrotoxicity, infusion reactions, and appropriateness of therapy  BMP and CBC will be ordered per protocol    Plan for trough as patient approaches steady state, prior to the 4th  dose at approximately 20:30 on 4/21/2022  Pharmacy will continue to follow the patients culture results and clinical progress daily      Gustavo Gilliland, Pharmacist

## 2022-04-20 NOTE — PROGRESS NOTES
[facility] Practice Progress Note - Tracey Giles 68 y o  female MRN: [id number]    Unit/Bed#: [address] [place] [address]- Encounter: [id number]      Assessment/Plan:  * Sepsis [facility])  Assessment & Plan  Patient met SIRS criteria with tachypnea and elevated white blood cell count of more than 20 with suspected source being cellulitis of left foot and ankle  Patient received vancomycin and ceftriaxone at the ED  Lactic acid was within normal limits  X-ray showed no acute abnormality but with swelling  Blood cultures were collected x2  Procalcitonin within normal limits  · Monitor vital signs  · Monitor blood cultures  · Continue vancomycin and cefazolin  · MRSA swab, consider discontinuing vancomycin if negative  · Monitor for improvement daily by marking cellulitis  · WBC persistently elevated  Continue to monitor  History of pulmonary embolus (PE)  Assessment & Plan  History of multiple pulmonary emboli back in January of 2022  She was started on Eliquis at that time  Stage 3 chronic kidney disease [facility])  Assessment & Plan  Lab Results   Component Value Date    EGFR 54 04/20/2022    EGFR 57 04/19/2022    EGFR 46 04/18/2022    CREATININE 1 00 04/20/2022    CREATININE 0 95 04/19/2022    CREATININE 1 13 04/18/2022   Baseline creatinine at 1 with GFR below 60s  Monitor creatinine daily  Limit nephrotoxic agents and renally dose medications  Mixed hyperlipidemia  Assessment & Plan  Continue home Lipitor  CAD (coronary artery disease)  Assessment & Plan  Continue home medication aspirin  Essential hypertension  Assessment & Plan  Restarted on Lopressor due to elevated blood pressures  Centrilobular emphysema ([address] )  Assessment & Plan  Patient is on home oxygen at baseline for this  Her home oxygen requirements are  Continue with home medications Trelegy and albuterol      Subjective:   Patient seen at bedside  Reports feeling well with the pain of her foot improving    She is able to bear weight and walk to the bathroom  She reports that bring weight is painful but improved from yesterday  No chest pain, abdominal pain, nausea, vomiting, diarrhea  Objective:     Vitals: Blood pressure 145/63, pulse 75, temperature 97 8 °F (36 6 °C), temperature source Oral, resp  rate 18, height 5' 1" (1 549 m), weight 76 4 kg (168 lb 6 4 oz), SpO2 95 %, not currently breastfeeding  ,Body mass index is 31 82 kg/m²  Wt Readings from Last 3 Encounters:   04/18/22 76 4 kg (168 lb 6 4 oz)   03/02/22 76 2 kg (168 lb)   02/08/22 77 6 kg (171 lb)       Intake/Output Summary (Last 24 hours) at 4/20/2022 1018  Last data filed at 4/20/2022 8353  Gross per 24 hour   Intake --   Output 1200 ml   Net -1200 ml       Physical Exam:   General:  Active and alert  Abdomen:  Soft depressible, no tenderness to palpation  Extremities:  Mild tenderness to palpation with minimal swelling  Good pulses dorsalis pedis and posterior tibialis pulse 2  No pain on passive movement of the ankle                  Recent Results (from the past 24 hour(s))   Basic metabolic panel    Collection Time: 04/20/22  8:07 AM   Result Value Ref Range    Sodium 145 136 - 145 mmol/L    Potassium 4 7 3 5 - 5 3 mmol/L    Chloride 107 100 - 108 mmol/L    CO2 30 21 - 32 mmol/L    ANION GAP 8 4 - 13 mmol/L    BUN 10 5 - 25 mg/dL    Creatinine 1 00 0 60 - 1 30 mg/dL    Glucose 105 65 - 140 mg/dL    Calcium 8 4 8 3 - 10 1 mg/dL    eGFR 54 ml/min/1 73sq m   CBC and differential    Collection Time: 04/20/22  8:07 AM   Result Value Ref Range    WBC 15 85 (H) 4 31 - 10 16 Thousand/uL    RBC 3 98 3 81 - 5 12 Million/uL    Hemoglobin 12 6 11 5 - 15 4 g/dL    Hematocrit 41 8 34 8 - 46 1 %     (H) 82 - 98 fL    MCH 31 7 26 8 - 34 3 pg    MCHC 30 1 (L) 31 4 - 37 4 g/dL    RDW 13 5 11 6 - 15 1 %    MPV 11 7 8 9 - 12 7 fL    Platelets 353 158 - 131 Thousands/uL   Vancomycin, trough    Collection Time: 04/20/22  8:07 AM   Result Value Ref Range    Vancomycin Tr 9 9 (L) 10 0 - 20 0 ug/mL   Sedimentation rate, automated    Collection Time: 04/20/22  8:07 AM   Result Value Ref Range    Sed Rate 67 (H) 0 - 29 mm/hour   Manual Differential(PHLEBS Do Not Order)    Collection Time: 04/20/22  8:07 AM   Result Value Ref Range    Segmented % 73 43 - 75 %    Lymphocytes % 15 14 - 44 %    Monocytes % 1 (L) 4 - 12 %    Eosinophils, % 1 0 - 6 %    Basophils % 3 (H) 0 - 1 %    Promyelocytes % 3 (H) 0 - 0 %    Atypical Lymphocytes % 4 (H) <=0 %    Absolute Neutrophils 11 57 (H) 1 85 - 7 62 Thousand/uL    Lymphocytes Absolute 2 38 0 60 - 4 47 Thousand/uL    Monocytes Absolute 0 16 0 00 - 1 22 Thousand/uL    Eosinophils Absolute 0 16 0 00 - 0 40 Thousand/uL    Basophils Absolute 0 48 (H) 0 00 - 0 10 Thousand/uL    Total Counted      RBC Morphology Present     Basophilic Stippling Present     Platelet Estimate Adequate Adequate    Large Platelet Present        Current Facility-Administered Medications   Medication Dose Route Frequency Provider Last Rate Last Admin    acetaminophen (TYLENOL) tablet 650 mg  650 mg Oral Q6H PRN Gee Garcia, DO   650 mg at 04/20/22 0215    apixaban (ELIQUIS) tablet 5 mg  5 mg Oral BID Gee Garcia, DO   5 mg at 04/20/22 5447    aspirin (ECOTRIN LOW STRENGTH) EC tablet 81 mg  81 mg Oral Daily Gee Garcia, DO   81 mg at 04/20/22 6126    atorvastatin (LIPITOR) tablet 40 mg  40 mg Oral Daily Gee Garcia, DO   40 mg at 04/20/22 3287    ceFAZolin (ANCEF) IVPB (premix in dextrose) 1,000 mg 50 mL  1,000 mg Intravenous Q8H Gee Garcia,  mL/hr at 04/20/22 0215 1,000 mg at 04/20/22 0215    fluticasone (FLOVENT HFA) 110 MCG/ACT inhaler 1 puff  1 puff Inhalation Q12H [address] ul Jey Garcia, DO   1 puff at 04/20/22 0823    ipratropium-albuterol (DUO-NEB) 0 5-2 5 mg/3 mL inhalation solution 3 mL  3 mL Nebulization Q6H Geovanna Aguilar, DO   3 mL at 04/20/22 0730    metoprolol tartrate (LOPRESSOR) partial tablet 12 5 mg  12 5 mg Oral BID Geovanna Aguilar DO   12 5 mg at 04/20/22 0863    oxyCODONE (ROXICODONE) IR tablet 2 5 mg  2 5 mg Oral Q4H PRN Gee Khanna Garcia, DO   2 5 mg at 04/20/22 0854    sodium chloride 0 9 % infusion  125 mL/hr Intravenous Continuous Gee Garcia,  mL/hr at 04/20/22 0223 125 mL/hr at 04/20/22 0223    umeclidinium-vilanterol (ANORO ELLIPTA) 62 5-25 MCG/INH inhaler 1 puff  1 puff Inhalation Daily Gee Garcia, DO   1 puff at 04/20/22 1996    vancomycin (VANCOCIN) IVPB (premix in dextrose) 1,000 mg 200 mL  15 mg/kg (Adjusted) Intravenous Q12H Gee Garcia,  mL/hr at 04/20/22 0932 1,000 mg at 04/20/22 0932       Invasive Devices  Report    Peripheral Intravenous Line            Peripheral IV 04/18/22 Right Antecubital 1 day    Peripheral IV 04/18/22 Right;Ventral (anterior) Wrist 1 day                Lab, Imaging and other studies: I have personally reviewed pertinent reports      VTE Pharmacologic Prophylaxis:  Elyse Villareal MD

## 2022-04-20 NOTE — PLAN OF CARE
Problem: Potential for Falls  Goal: Patient will remain free of falls  Description: INTERVENTIONS:  - Educate patient/family on patient safety including physical limitations  - Instruct patient to call for assistance with activity   - Consult OT/PT to assist with strengthening/mobility   - Keep Call bell within reach  - Keep bed low and locked with side rails adjusted as appropriate  - Keep care items and personal belongings within reach  - Initiate and maintain comfort rounds  - Make Fall Risk Sign visible to staff  - Apply yellow socks and bracelet for high fall risk patients  - Consider moving patient to room near nurses station  Outcome: Progressing     Problem: PAIN - ADULT  Goal: Verbalizes/displays adequate comfort level or baseline comfort level  Description: Interventions:  - Encourage patient to monitor pain and request assistance  - Assess pain using appropriate pain scale  - Administer analgesics based on type and severity of pain and evaluate response  - Implement non-pharmacological measures as appropriate and evaluate response  - Consider cultural and social influences on pain and pain management  - Notify physician/advanced practitioner if interventions unsuccessful or patient reports new pain  Outcome: Progressing     Problem: INFECTION - ADULT  Goal: Absence or prevention of progression during hospitalization  Description: INTERVENTIONS:  - Assess and monitor for signs and symptoms of infection  - Monitor lab/diagnostic results  - Monitor all insertion sites, i e  indwelling lines, tubes, and drains  - Monitor endotracheal if appropriate and nasal secretions for changes in amount and color  - [place] appropriate cooling/warming therapies per order  - Administer medications as ordered  - Instruct and encourage patient and family to use good hand hygiene technique  - Identify and instruct in appropriate isolation precautions for identified infection/condition  Outcome: Progressing  Goal: Absence of fever/infection during neutropenic period  Description: INTERVENTIONS:  - Monitor WBC    Outcome: Progressing     Problem: SAFETY ADULT  Goal: Patient will remain free of falls  Description: INTERVENTIONS:  - Educate patient/family on patient safety including physical limitations  - Instruct patient to call for assistance with activity   - Consult OT/PT to assist with strengthening/mobility   - Keep Call bell within reach  - Keep bed low and locked with side rails adjusted as appropriate  - Keep care items and personal belongings within reach  - Initiate and maintain comfort rounds  - Make Fall Risk Sign visible to staff  - Apply yellow socks and bracelet for high fall risk patients  - Consider moving patient to room near nurses station  Outcome: Progressing  Goal: Maintain or return to baseline ADL function  Description: INTERVENTIONS:  -  Assess patient's ability to carry out ADLs; assess patient's baseline for ADL function and identify physical deficits which impact ability to perform ADLs (bathing, care of mouth/teeth, toileting, grooming, dressing, etc )  - Assess/evaluate cause of self-care deficits   - Assess range of motion  - Assess patient's mobility; develop plan if impaired  - Assess patient's need for assistive devices and provide as appropriate  - Encourage maximum independence but intervene and supervise when necessary  - Involve family in performance of ADLs  - Assess for home care needs following discharge   - Consider OT consult to assist with ADL evaluation and planning for discharge  - Provide patient education as appropriate  Outcome: Progressing  Goal: Maintains/Returns to pre admission functional level  Description: INTERVENTIONS:  - Perform BMAT or MOVE assessment daily    - Set and communicate daily mobility goal to care team and patient/family/caregiver     - Collaborate with rehabilitation services on mobility goals if consulted  - Out of bed for toileting  - Record patient progress and toleration of activity level   Outcome: Progressing     Problem: DISCHARGE PLANNING  Goal: Discharge to home or other facility with appropriate resources  Description: INTERVENTIONS:  - Identify barriers to discharge w/patient and caregiver  - Arrange for needed discharge resources and transportation as appropriate  - Identify discharge learning needs (meds, wound care, etc )  - Arrange for interpretive services to assist at discharge as needed  - Refer to Case Management Department for coordinating discharge planning if the patient needs post-hospital services based on physician/advanced practitioner order or complex needs related to functional status, cognitive ability, or social support system  Outcome: Progressing     Problem: MOBILITY - ADULT  Goal: Maintain or return to baseline ADL function  Description: INTERVENTIONS:  -  Assess patient's ability to carry out ADLs; assess patient's baseline for ADL function and identify physical deficits which impact ability to perform ADLs (bathing, care of mouth/teeth, toileting, grooming, dressing, etc )  - Assess/evaluate cause of self-care deficits   - Assess range of motion  - Assess patient's mobility; develop plan if impaired  - Assess patient's need for assistive devices and provide as appropriate  - Encourage maximum independence but intervene and supervise when necessary  - Involve family in performance of ADLs  - Assess for home care needs following discharge   - Consider OT consult to assist with ADL evaluation and planning for discharge  - Provide patient education as appropriate  Outcome: Progressing  Goal: Maintains/Returns to pre admission functional level  Description: INTERVENTIONS:  - Perform BMAT or MOVE assessment daily    - Set and communicate daily mobility goal to care team and patient/family/caregiver     - Collaborate with rehabilitation services on mobility goals if consulted  - Out of bed for toileting  - Record patient progress and toleration of activity level   Outcome: Progressing     Problem: RESPIRATORY - ADULT  Goal: Achieves optimal ventilation and oxygenation  Description: INTERVENTIONS:  - Assess for changes in respiratory status  - Assess for changes in mentation and behavior  - Position to facilitate oxygenation and minimize respiratory effort  - Oxygen administered by appropriate delivery if ordered  - Initiate smoking cessation education as indicated  - Encourage broncho-pulmonary hygiene including cough, deep breathe, Incentive Spirometry  - Assess the need for suctioning and aspirate as needed  - Assess and instruct to report SOB or any respiratory difficulty  - Respiratory Therapy support as indicated  Outcome: Progressing

## 2022-04-20 NOTE — PROGRESS NOTES
Sepsis Week 3 Survey      Responses   Facility patient discharged from?  [facility]   Does the patient have one of the following disease processes/diagnoses(primary or secondary)?  Sepsis   Week 3 attempt successful?  No   Unsuccessful attempts  Attempt 2          Cl Karimi RN   The patient's vancomycin therapy has been discontinued  Pharmacy will sign off now, thank you for this consult

## 2022-04-20 NOTE — PLAN OF CARE
Problem: Potential for Falls  Goal: Patient will remain free of falls  Description: INTERVENTIONS:  - Educate patient/family on patient safety including physical limitations  - Instruct patient to call for assistance with activity   - Consult OT/PT to assist with strengthening/mobility   - Keep Call bell within reach  - Keep bed low and locked with side rails adjusted as appropriate  - Keep care items and personal belongings within reach  - Initiate and maintain comfort rounds  - Make Fall Risk Sign visible to staff  - Offer Toileting every 2 Hours, in advance of need  - Initiate/Maintain bbed alarm  - Obtain necessary fall risk management equipment:   - Apply yellow socks and bracelet for high fall risk patients  - Consider moving patient to room near nurses station  Outcome: Progressing     Problem: PAIN - ADULT  Goal: Verbalizes/displays adequate comfort level or baseline comfort level  Description: Interventions:  - Encourage patient to monitor pain and request assistance  - Assess pain using appropriate pain scale  - Administer analgesics based on type and severity of pain and evaluate response  - Implement non-pharmacological measures as appropriate and evaluate response  - Consider cultural and social influences on pain and pain management  - Notify physician/advanced practitioner if interventions unsuccessful or patient reports new pain  Outcome: Progressing     Problem: INFECTION - ADULT  Goal: Absence or prevention of progression during hospitalization  Description: INTERVENTIONS:  - Assess and monitor for signs and symptoms of infection  - Monitor lab/diagnostic results  - Monitor all insertion sites, i e  indwelling lines, tubes, and drains  - Monitor endotracheal if appropriate and nasal secretions for changes in amount and color  - [place] appropriate cooling/warming therapies per order  - Administer medications as ordered  - Instruct and encourage patient and family to use good hand hygiene technique  - Identify and instruct in appropriate isolation precautions for identified infection/condition  Outcome: Progressing  Goal: Absence of fever/infection during neutropenic period  Description: INTERVENTIONS:  - Monitor WBC    Outcome: Progressing     Problem: SAFETY ADULT  Goal: Patient will remain free of falls  Description: INTERVENTIONS:  - Educate patient/family on patient safety including physical limitations  - Instruct patient to call for assistance with activity   - Consult OT/PT to assist with strengthening/mobility   - Keep Call bell within reach  - Keep bed low and locked with side rails adjusted as appropriate  - Keep care items and personal belongings within reach  - Initiate and maintain comfort rounds  - Make Fall Risk Sign visible to staff  - Offer Toileting every 2 Hours, in advance of need  - Initiate/Maintain bed alarm  - Obtain necessary fall risk management equipment:   - Apply yellow socks and bracelet for high fall risk patients  - Consider moving patient to room near nurses station  Outcome: Progressing  Goal: Maintain or return to baseline ADL function  Description: INTERVENTIONS:  -  Assess patient's ability to carry out ADLs; assess patient's baseline for ADL function and identify physical deficits which impact ability to perform ADLs (bathing, care of mouth/teeth, toileting, grooming, dressing, etc )  - Assess/evaluate cause of self-care deficits   - Assess range of motion  - Assess patient's mobility; develop plan if impaired  - Assess patient's need for assistive devices and provide as appropriate  - Encourage maximum independence but intervene and supervise when necessary  - Involve family in performance of ADLs  - Assess for home care needs following discharge   - Consider OT consult to assist with ADL evaluation and planning for discharge  - Provide patient education as appropriate  Outcome: Progressing  Goal: Maintains/Returns to pre admission functional level  Description: INTERVENTIONS:  - Perform BMAT or MOVE assessment daily    - Set and communicate daily mobility goal to care team and patient/family/caregiver  - Collaborate with rehabilitation services on mobility goals if consulted  - Perform Range of Motion 3 times a day  - Reposition patient every 2 hours    - Dangle patient 3 times a day  - Stand patient 3 times a day  - Ambulate patient 3 times a day  - Out of bed to chair 3 times a day   - Out of bed for meals 3 times a day  - Out of bed for toileting  - Record patient progress and toleration of activity level   Outcome: Progressing     Problem: DISCHARGE PLANNING  Goal: Discharge to home or other facility with appropriate resources  Description: INTERVENTIONS:  - Identify barriers to discharge w/patient and caregiver  - Arrange for needed discharge resources and transportation as appropriate  - Identify discharge learning needs (meds, wound care, etc )  - Arrange for interpretive services to assist at discharge as needed  - Refer to Case Management Department for coordinating discharge planning if the patient needs post-hospital services based on physician/advanced practitioner order or complex needs related to functional status, cognitive ability, or social support system  Outcome: Progressing     Problem: RESPIRATORY - ADULT  Goal: Achieves optimal ventilation and oxygenation  Description: INTERVENTIONS:  - Assess for changes in respiratory status  - Assess for changes in mentation and behavior  - Position to facilitate oxygenation and minimize respiratory effort  - Oxygen administered by appropriate delivery if ordered  - Initiate smoking cessation education as indicated  - Encourage broncho-pulmonary hygiene including cough, deep breathe, Incentive Spirometry  - Assess the need for suctioning and aspirate as needed  - Assess and instruct to report SOB or any respiratory difficulty  - Respiratory Therapy support as indicated  Outcome: Progressing Problem: MOBILITY - ADULT  Goal: Maintain or return to baseline ADL function  Description: INTERVENTIONS:  -  Assess patient's ability to carry out ADLs; assess patient's baseline for ADL function and identify physical deficits which impact ability to perform ADLs (bathing, care of mouth/teeth, toileting, grooming, dressing, etc )  - Assess/evaluate cause of self-care deficits   - Assess range of motion  - Assess patient's mobility; develop plan if impaired  - Assess patient's need for assistive devices and provide as appropriate  - Encourage maximum independence but intervene and supervise when necessary  - Involve family in performance of ADLs  - Assess for home care needs following discharge   - Consider OT consult to assist with ADL evaluation and planning for discharge  - Provide patient education as appropriate  Outcome: Progressing  Goal: Maintains/Returns to pre admission functional level  Description: INTERVENTIONS:  - Perform BMAT or MOVE assessment daily    - Set and communicate daily mobility goal to care team and patient/family/caregiver  - Collaborate with rehabilitation services on mobility goals if consulted  - Perform Range of Motion 3 times a day  - Reposition patient every 2 hours    - Dangle patient 3 times a day  - Stand patient 3 times a day  - Ambulate patient 3 times a day  - Out of bed to chair 3 times a day   - Out of bed for meals 3 times a day  - Out of bed for toileting  - Record patient progress and toleration of activity level   Outcome: Progressing

## 2022-04-21 ENCOUNTER — APPOINTMENT (INPATIENT)
Dept: RADIOLOGY | Facility: HOSPITAL | Age: 77
DRG: 872 | End: 2022-04-21
Payer: MEDICARE

## 2022-04-21 PROBLEM — D72.829 LEUKOCYTOSIS: Status: ACTIVE | Noted: 2022-04-21

## 2022-04-21 LAB
BASOPHILS # BLD AUTO: 0.46 THOUSANDS/ΜL (ref 0–0.1)
BASOPHILS NFR BLD AUTO: 2 % (ref 0–1)
EOSINOPHIL # BLD AUTO: 0.32 THOUSAND/ΜL (ref 0–0.61)
EOSINOPHIL NFR BLD AUTO: 1 % (ref 0–6)
ERYTHROCYTE [DISTWIDTH] IN BLOOD BY AUTOMATED COUNT: 13.5 % (ref 11.6–15.1)
HCT VFR BLD AUTO: 39.9 % (ref 34.8–46.1)
HGB BLD-MCNC: 12.1 G/DL (ref 11.5–15.4)
IMM GRANULOCYTES # BLD AUTO: >0.5 THOUSAND/UL (ref 0–0.2)
IMM GRANULOCYTES NFR BLD AUTO: 2 % (ref 0–2)
LYMPHOCYTES # BLD AUTO: 1.65 THOUSANDS/ΜL (ref 0.6–4.47)
LYMPHOCYTES NFR BLD AUTO: 6 % (ref 14–44)
MCH RBC QN AUTO: 32.3 PG (ref 26.8–34.3)
MCHC RBC AUTO-ENTMCNC: 30.3 G/DL (ref 31.4–37.4)
MCV RBC AUTO: 106 FL (ref 82–98)
MONOCYTES # BLD AUTO: 2.48 THOUSAND/ΜL (ref 0.17–1.22)
MONOCYTES NFR BLD AUTO: 9 % (ref 4–12)
NEUTROPHILS # BLD AUTO: 21.32 THOUSANDS/ΜL (ref 1.85–7.62)
NEUTS SEG NFR BLD AUTO: 80 % (ref 43–75)
NRBC BLD AUTO-RTO: 0 /100 WBCS
PLATELET # BLD AUTO: 292 THOUSANDS/UL (ref 149–390)
PMV BLD AUTO: 12.2 FL (ref 8.9–12.7)
PROCALCITONIN SERPL-MCNC: <0.05 NG/ML
RBC # BLD AUTO: 3.75 MILLION/UL (ref 3.81–5.12)
WBC # BLD AUTO: 26.77 THOUSAND/UL (ref 4.31–10.16)

## 2022-04-21 PROCEDURE — 71045 X-RAY EXAM CHEST 1 VIEW: CPT

## 2022-04-21 PROCEDURE — 94760 N-INVAS EAR/PLS OXIMETRY 1: CPT

## 2022-04-21 PROCEDURE — 85025 COMPLETE CBC W/AUTO DIFF WBC: CPT | Performed by: STUDENT IN AN ORGANIZED HEALTH CARE EDUCATION/TRAINING PROGRAM

## 2022-04-21 PROCEDURE — 99232 SBSQ HOSP IP/OBS MODERATE 35: CPT | Performed by: FAMILY MEDICINE

## 2022-04-21 PROCEDURE — 94640 AIRWAY INHALATION TREATMENT: CPT

## 2022-04-21 PROCEDURE — 99223 1ST HOSP IP/OBS HIGH 75: CPT | Performed by: INTERNAL MEDICINE

## 2022-04-21 RX ORDER — ACETAMINOPHEN 325 MG/1
650 TABLET ORAL EVERY 6 HOURS SCHEDULED
Status: DISCONTINUED | OUTPATIENT
Start: 2022-04-21 | End: 2022-04-22 | Stop reason: HOSPADM

## 2022-04-21 RX ADMIN — Medication 12.5 MG: at 08:36

## 2022-04-21 RX ADMIN — FLUTICASONE PROPIONATE 1 PUFF: 110 AEROSOL, METERED RESPIRATORY (INHALATION) at 09:53

## 2022-04-21 RX ADMIN — IPRATROPIUM BROMIDE AND ALBUTEROL SULFATE 3 ML: 2.5; .5 SOLUTION RESPIRATORY (INHALATION) at 19:24

## 2022-04-21 RX ADMIN — FLUTICASONE PROPIONATE 1 PUFF: 110 AEROSOL, METERED RESPIRATORY (INHALATION) at 22:48

## 2022-04-21 RX ADMIN — CEFAZOLIN SODIUM 2000 MG: 2 SOLUTION INTRAVENOUS at 01:32

## 2022-04-21 RX ADMIN — IPRATROPIUM BROMIDE AND ALBUTEROL SULFATE 3 ML: 2.5; .5 SOLUTION RESPIRATORY (INHALATION) at 01:10

## 2022-04-21 RX ADMIN — APIXABAN 5 MG: 5 TABLET, FILM COATED ORAL at 18:07

## 2022-04-21 RX ADMIN — ATORVASTATIN CALCIUM 40 MG: 40 TABLET, FILM COATED ORAL at 08:36

## 2022-04-21 RX ADMIN — IPRATROPIUM BROMIDE AND ALBUTEROL SULFATE 3 ML: 2.5; .5 SOLUTION RESPIRATORY (INHALATION) at 07:36

## 2022-04-21 RX ADMIN — ACETAMINOPHEN 650 MG: 325 TABLET, FILM COATED ORAL at 11:30

## 2022-04-21 RX ADMIN — ASPIRIN 81 MG: 81 TABLET, COATED ORAL at 08:36

## 2022-04-21 RX ADMIN — IPRATROPIUM BROMIDE AND ALBUTEROL SULFATE 3 ML: 2.5; .5 SOLUTION RESPIRATORY (INHALATION) at 13:31

## 2022-04-21 RX ADMIN — APIXABAN 5 MG: 5 TABLET, FILM COATED ORAL at 08:36

## 2022-04-21 RX ADMIN — ACETAMINOPHEN 650 MG: 325 TABLET, FILM COATED ORAL at 18:07

## 2022-04-21 RX ADMIN — CEFAZOLIN SODIUM 2000 MG: 2 SOLUTION INTRAVENOUS at 18:12

## 2022-04-21 RX ADMIN — ACETAMINOPHEN 650 MG: 325 TABLET, FILM COATED ORAL at 23:33

## 2022-04-21 RX ADMIN — CEFAZOLIN SODIUM 2000 MG: 2 SOLUTION INTRAVENOUS at 09:53

## 2022-04-21 RX ADMIN — UMECLIDINIUM BROMIDE AND VILANTEROL TRIFENATATE 1 PUFF: 62.5; 25 POWDER RESPIRATORY (INHALATION) at 09:53

## 2022-04-21 RX ADMIN — Medication 12.5 MG: at 18:07

## 2022-04-21 NOTE — PLAN OF CARE
Problem: Potential for Falls  Goal: Patient will remain free of falls  Description: INTERVENTIONS:  - Educate patient/family on patient safety including physical limitations  - Instruct patient to call for assistance with activity   - Consult OT/PT to assist with strengthening/mobility   - Keep Call bell within reach  - Keep bed low and locked with side rails adjusted as appropriate  - Keep care items and personal belongings within reach  - Initiate and maintain comfort rounds  - Make Fall Risk Sign visible to staff  - Offer Toileting every 2 Hours, in advance of need  - Initiate/Maintain bed alarm  - Obtain necessary fall risk management equipment:   - Apply yellow socks and bracelet for high fall risk patients  - Consider moving patient to room near nurses station  Outcome: Progressing     Problem: PAIN - ADULT  Goal: Verbalizes/displays adequate comfort level or baseline comfort level  Description: Interventions:  - Encourage patient to monitor pain and request assistance  - Assess pain using appropriate pain scale  - Administer analgesics based on type and severity of pain and evaluate response  - Implement non-pharmacological measures as appropriate and evaluate response  - Consider cultural and social influences on pain and pain management  - Notify physician/advanced practitioner if interventions unsuccessful or patient reports new pain  Outcome: Progressing     Problem: INFECTION - ADULT  Goal: Absence or prevention of progression during hospitalization  Description: INTERVENTIONS:  - Assess and monitor for signs and symptoms of infection  - Monitor lab/diagnostic results  - Monitor all insertion sites, i e  indwelling lines, tubes, and drains  - Monitor endotracheal if appropriate and nasal secretions for changes in amount and color  - [place] appropriate cooling/warming therapies per order  - Administer medications as ordered  - Instruct and encourage patient and family to use good hand hygiene technique  - Identify and instruct in appropriate isolation precautions for identified infection/condition  Outcome: Progressing  Goal: Absence of fever/infection during neutropenic period  Description: INTERVENTIONS:  - Monitor WBC    Outcome: Progressing     Problem: SAFETY ADULT  Goal: Patient will remain free of falls  Description: INTERVENTIONS:  - Educate patient/family on patient safety including physical limitations  - Instruct patient to call for assistance with activity   - Consult OT/PT to assist with strengthening/mobility   - Keep Call bell within reach  - Keep bed low and locked with side rails adjusted as appropriate  - Keep care items and personal belongings within reach  - Initiate and maintain comfort rounds  - Make Fall Risk Sign visible to staff  - Offer Toileting every 2 Hours, in advance of need  - Initiate/Maintain bed alarm  - Obtain necessary fall risk management equipment:   - Apply yellow socks and bracelet for high fall risk patients  - Consider moving patient to room near nurses station  Outcome: Progressing  Goal: Maintain or return to baseline ADL function  Description: INTERVENTIONS:  -  Assess patient's ability to carry out ADLs; assess patient's baseline for ADL function and identify physical deficits which impact ability to perform ADLs (bathing, care of mouth/teeth, toileting, grooming, dressing, etc )  - Assess/evaluate cause of self-care deficits   - Assess range of motion  - Assess patient's mobility; develop plan if impaired  - Assess patient's need for assistive devices and provide as appropriate  - Encourage maximum independence but intervene and supervise when necessary  - Involve family in performance of ADLs  - Assess for home care needs following discharge   - Consider OT consult to assist with ADL evaluation and planning for discharge  - Provide patient education as appropriate  Outcome: Progressing  Goal: Maintains/Returns to pre admission functional level  Description: INTERVENTIONS:  - Perform BMAT or MOVE assessment daily    - Set and communicate daily mobility goal to care team and patient/family/caregiver  - Collaborate with rehabilitation services on mobility goals if consulted  - Perform Range of Motion 3 times a day  - Reposition patient every 2 hours    - Dangle patient 3 times a day  - Stand patient 3 times a day  - Ambulate patient 3 times a day  - Out of bed to chair 3 times a day   - Out of bed for meals 3 times a day  - Out of bed for toileting  - Record patient progress and toleration of activity level   Outcome: Progressing     Problem: DISCHARGE PLANNING  Goal: Discharge to home or other facility with appropriate resources  Description: INTERVENTIONS:  - Identify barriers to discharge w/patient and caregiver  - Arrange for needed discharge resources and transportation as appropriate  - Identify discharge learning needs (meds, wound care, etc )  - Arrange for interpretive services to assist at discharge as needed  - Refer to Case Management Department for coordinating discharge planning if the patient needs post-hospital services based on physician/advanced practitioner order or complex needs related to functional status, cognitive ability, or social support system  Outcome: Progressing     Problem: RESPIRATORY - ADULT  Goal: Achieves optimal ventilation and oxygenation  Description: INTERVENTIONS:  - Assess for changes in respiratory status  - Assess for changes in mentation and behavior  - Position to facilitate oxygenation and minimize respiratory effort  - Oxygen administered by appropriate delivery if ordered  - Initiate smoking cessation education as indicated  - Encourage broncho-pulmonary hygiene including cough, deep breathe, Incentive Spirometry  - Assess the need for suctioning and aspirate as needed  - Assess and instruct to report SOB or any respiratory difficulty  - Respiratory Therapy support as indicated  Outcome: Progressing Problem: MOBILITY - ADULT  Goal: Maintain or return to baseline ADL function  Description: INTERVENTIONS:  -  Assess patient's ability to carry out ADLs; assess patient's baseline for ADL function and identify physical deficits which impact ability to perform ADLs (bathing, care of mouth/teeth, toileting, grooming, dressing, etc )  - Assess/evaluate cause of self-care deficits   - Assess range of motion  - Assess patient's mobility; develop plan if impaired  - Assess patient's need for assistive devices and provide as appropriate  - Encourage maximum independence but intervene and supervise when necessary  - Involve family in performance of ADLs  - Assess for home care needs following discharge   - Consider OT consult to assist with ADL evaluation and planning for discharge  - Provide patient education as appropriate  Outcome: Progressing  Goal: Maintains/Returns to pre admission functional level  Description: INTERVENTIONS:  - Perform BMAT or MOVE assessment daily    - Set and communicate daily mobility goal to care team and patient/family/caregiver  - Collaborate with rehabilitation services on mobility goals if consulted  - Perform Range of Motion 3 times a day  - Reposition patient every 2 hours    - Dangle patient 3 times a day  - Stand patient 3 times a day  - Ambulate patient 3 times a day  - Out of bed to chair 3 times a day   - Out of bed for meals 3 times a day  - Out of bed for toileting  - Record patient progress and toleration of activity level   Outcome: Progressing

## 2022-04-21 NOTE — PROGRESS NOTES
[facility] Practice Progress Note - Shaka Suarez 68 y o  female MRN: [id number]    Unit/Bed#: 2 South 202 Encounter: [id number]      Assessment/Plan:  * Sepsis [facility])  Assessment & Plan  Patient met SIRS criteria with tachypnea and elevated white blood cell count of more than 20 with suspected source being cellulitis of left foot and ankle  Patient received vancomycin and ceftriaxone at the ED  Lactic acid was within normal limits  X-ray showed no acute abnormality but with swelling  Blood cultures were collected x2  Procalcitonin within normal limits  · Monitor vital signs  · Monitor blood cultures  · Continue vancomycin and cefazolin  · MRSA swab, consider discontinuing vancomycin if negative  · Monitor for improvement daily by marking cellulitis  Leukocytosis  Assessment & Plan  Leukocytosis elevated from 15 to 26 with neutrophilia, monocytosis, basophilia, and immature granulocytes showed on differential and promyelocytes  · Will discuss with Hematology given elevated leukocytosis with findings as above and given her cellulitis is improving  History of pulmonary embolus (PE)  Assessment & Plan  History of multiple pulmonary emboli back in January of 2022  She was started on Eliquis at that time  Stage 3 chronic kidney disease [facility])  Assessment & Plan  Lab Results   Component Value Date    EGFR 54 04/20/2022    EGFR 57 04/19/2022    EGFR 46 04/18/2022    CREATININE 1 00 04/20/2022    CREATININE 0 95 04/19/2022    CREATININE 1 13 04/18/2022   Baseline creatinine at 1 with GFR below 60s  Monitor creatinine daily  Limit nephrotoxic agents and renally dose medications  Mixed hyperlipidemia  Assessment & Plan  Continue home Lipitor  CAD (coronary artery disease)  Assessment & Plan  Continue home medication aspirin  Essential hypertension  Assessment & Plan  Restarted on Lopressor due to elevated blood pressures      Centrilobular emphysema ([address] )  Assessment & Plan  Patient is on home oxygen at baseline for this  Her home oxygen requirements are 3 L  Continue with home medications Trelegy and albuterol    Subjective:   Patient seen at bedside  She reports she had mild abdominal pain yesterday but this quickly resolved  Denies nausea vomiting or diarrhea  Reports she continues having pain in her left foot  She can still ambulate but continues to have pain  Reports pain is mostly on the top of her foot close to her ankle and on the lateral side of her foot as well  Objective:     Vitals: Blood pressure 158/83, pulse 84, temperature 98 3 °F (36 8 °C), temperature source Oral, resp  rate 17, height 5' 1" (1 549 m), weight 76 4 kg (168 lb 6 4 oz), SpO2 92 %, not currently breastfeeding  ,Body mass index is 31 82 kg/m²  Wt Readings from Last 3 Encounters:   04/18/22 76 4 kg (168 lb 6 4 oz)   03/02/22 76 2 kg (168 lb)   02/08/22 77 6 kg (171 lb)       Intake/Output Summary (Last 24 hours) at 4/21/2022 0849  Last data filed at 4/20/2022 2124  Gross per 24 hour   Intake 1000 ml   Output 1050 ml   Net -50 ml       Physical Exam:  Physical Exam  Vitals reviewed  Constitutional:       General: She is awake  She is not in acute distress  Abdominal:      General: Abdomen is flat  Bowel sounds are normal       Palpations: Abdomen is soft  Tenderness: There is no abdominal tenderness  There is no guarding  Neurological:      Mental Status: She is alert  Psychiatric:         Behavior: Behavior is cooperative  Continues with tenderness to palpation over dorsal foot close to the ankle and on the lateral side of the foot  Minimal to none of edema  Skin over foot is soft    No fluctuance noted on exam         Recent Results (from the past 24 hour(s))   CBC and differential    Collection Time: 04/21/22  5:19 AM   Result Value Ref Range    WBC 26 77 (H) 4 31 - 10 16 Thousand/uL    RBC 3 75 (L) 3 81 - 5 12 Million/uL    Hemoglobin 12 1 11 5 - 15 4 g/dL    Hematocrit 39 9 34 8 - 46 1 %    MCV 106 (H) 82 - 98 fL    MCH 32 3 26 8 - 34 3 pg    MCHC 30 3 (L) 31 4 - 37 4 g/dL    RDW 13 5 11 6 - 15 1 %    MPV 12 2 8 9 - 12 7 fL    Platelets 802 018 - 527 Thousands/uL    nRBC 0 /100 WBCs    Neutrophils Relative 80 (H) 43 - 75 %    Immat GRANS % 2 0 - 2 %    Lymphocytes Relative 6 (L) 14 - 44 %    Monocytes Relative 9 4 - 12 %    Eosinophils Relative 1 0 - 6 %    Basophils Relative 2 (H) 0 - 1 %    Neutrophils Absolute 21 32 (H) 1 85 - 7 62 Thousands/µL    Immature Grans Absolute >0 50 (H) 0 00 - 0 20 Thousand/uL    Lymphocytes Absolute 1 65 0 60 - 4 47 Thousands/µL    Monocytes Absolute 2 48 (H) 0 17 - 1 22 Thousand/µL    Eosinophils Absolute 0 32 0 00 - 0 61 Thousand/µL    Basophils Absolute 0 46 (H) 0 00 - 0 10 Thousands/µL       Current Facility-Administered Medications   Medication Dose Route Frequency Provider Last Rate Last Admin    acetaminophen (TYLENOL) tablet 650 mg  650 mg Oral Q6H PRN Gee Garcia, DO   650 mg at 04/20/22 1340    apixaban (ELIQUIS) tablet 5 mg  5 mg Oral BID Gee Garcia DO   5 mg at 04/21/22 0836    aspirin (ECOTRIN LOW STRENGTH) EC tablet 81 mg  81 mg Oral Daily Gee Garcia, DO   81 mg at 04/21/22 0836    atorvastatin (LIPITOR) tablet 40 mg  40 mg Oral Daily Gee Garcia, DO   40 mg at 04/21/22 0836    ceFAZolin (ANCEF) IVPB (premix in dextrose) 2,000 mg 50 mL  2,000 mg Intravenous Q8H Ron Hall  mL/hr at 04/21/22 0132 2,000 mg at 04/21/22 0132    fluticasone (FLOVENT HFA) 110 MCG/ACT inhaler 1 puff  1 puff Inhalation Q12H [address] ul Jey Garcia DO   1 puff at 04/20/22 2120    ipratropium-albuterol (DUO-NEB) 0 5-2 5 mg/3 mL inhalation solution 3 mL  3 mL Nebulization Q6H Geovanna Aguilar, DO   3 mL at 04/21/22 0736    metoprolol tartrate (LOPRESSOR) partial tablet 12 5 mg  12 5 mg Oral BID Geovanna Aguilar DO   12 5 mg at 04/21/22 0836    oxyCODONE (ROXICODONE) IR tablet 2 5 mg  2 5 mg Oral Q4H PRN Gee Garcia DO   2 5 mg at 04/20/22 2170    sodium chloride 0 9 % infusion  125 mL/hr Intravenous Continuous Gee Garcia,  mL/hr at 04/20/22 2120 125 mL/hr at 04/20/22 2120    umeclidinium-vilanterol (ANORO ELLIPTA) 62 5-25 MCG/INH inhaler 1 puff  1 puff Inhalation Daily Gee Garcia, DO   1 puff at 04/20/22 3584       Invasive Devices  Report    Peripheral Intravenous Line            Peripheral IV 04/18/22 Right Antecubital 2 days                Lab, Imaging and other studies: I have personally reviewed pertinent reports      VTE Pharmacologic Prophylaxis:  Elyse Munoz MD

## 2022-04-21 NOTE — PLAN OF CARE
Problem: Potential for Falls  Goal: Patient will remain free of falls  Description: INTERVENTIONS:  - Educate patient/family on patient safety including physical limitations  - Instruct patient to call for assistance with activity   - Consult OT/PT to assist with strengthening/mobility   - Keep Call bell within reach  - Keep bed low and locked with side rails adjusted as appropriate  - Keep care items and personal belongings within reach  - Initiate and maintain comfort rounds  - Make Fall Risk Sign visible to staff  - Offer Toileting every  Hours, in advance of need  - Initiate/Maintain alarm  - Obtain necessary fall risk management equipment:   - Apply yellow socks and bracelet for high fall risk patients  - Consider moving patient to room near nurses station  Outcome: Progressing     Problem: PAIN - ADULT  Goal: Verbalizes/displays adequate comfort level or baseline comfort level  Description: Interventions:  - Encourage patient to monitor pain and request assistance  - Assess pain using appropriate pain scale  - Administer analgesics based on type and severity of pain and evaluate response  - Implement non-pharmacological measures as appropriate and evaluate response  - Consider cultural and social influences on pain and pain management  - Notify physician/advanced practitioner if interventions unsuccessful or patient reports new pain  Outcome: Progressing     Problem: INFECTION - ADULT  Goal: Absence or prevention of progression during hospitalization  Description: INTERVENTIONS:  - Assess and monitor for signs and symptoms of infection  - Monitor lab/diagnostic results  - Monitor all insertion sites, i e  indwelling lines, tubes, and drains  - Monitor endotracheal if appropriate and nasal secretions for changes in amount and color  - [place]dy appropriate cooling/warming therapies per order  - Administer medications as ordered  - Instruct and encourage patient and family to use good hand hygiene technique  - Identify and instruct in appropriate isolation precautions for identified infection/condition  Outcome: Progressing  Goal: Absence of fever/infection during neutropenic period  Description: INTERVENTIONS:  - Monitor WBC    Outcome: Progressing     Problem: SAFETY ADULT  Goal: Patient will remain free of falls  Description: INTERVENTIONS:  - Educate patient/family on patient safety including physical limitations  - Instruct patient to call for assistance with activity   - Consult OT/PT to assist with strengthening/mobility   - Keep Call bell within reach  - Keep bed low and locked with side rails adjusted as appropriate  - Keep care items and personal belongings within reach  - Initiate and maintain comfort rounds  - Make Fall Risk Sign visible to staff  - Offer Toileting every  Hours, in advance of need  - Initiate/Maintain alarm  - Obtain necessary fall risk management equipment:   - Apply yellow socks and bracelet for high fall risk patients  - Consider moving patient to room near nurses station  Outcome: Progressing  Goal: Maintain or return to baseline ADL function  Description: INTERVENTIONS:  -  Assess patient's ability to carry out ADLs; assess patient's baseline for ADL function and identify physical deficits which impact ability to perform ADLs (bathing, care of mouth/teeth, toileting, grooming, dressing, etc )  - Assess/evaluate cause of self-care deficits   - Assess range of motion  - Assess patient's mobility; develop plan if impaired  - Assess patient's need for assistive devices and provide as appropriate  - Encourage maximum independence but intervene and supervise when necessary  - Involve family in performance of ADLs  - Assess for home care needs following discharge   - Consider OT consult to assist with ADL evaluation and planning for discharge  - Provide patient education as appropriate  Outcome: Progressing  Goal: Maintains/Returns to pre admission functional level  Description: INTERVENTIONS:  - Perform BMAT or MOVE assessment daily    - Set and communicate daily mobility goal to care team and patient/family/caregiver  - Collaborate with rehabilitation services on mobility goals if consulted  - Perform Range of Motion  times a day  - Reposition patient every  hours    - Dangle patient  times a day  - Stand patient  times a day  - Ambulate patient  times a day  - Out of bed to chair  times a day   - Out of bed for meals  times a day  - Out of bed for toileting  - Record patient progress and toleration of activity level   Outcome: Progressing     Problem: DISCHARGE PLANNING  Goal: Discharge to home or other facility with appropriate resources  Description: INTERVENTIONS:  - Identify barriers to discharge w/patient and caregiver  - Arrange for needed discharge resources and transportation as appropriate  - Identify discharge learning needs (meds, wound care, etc )  - Arrange for interpretive services to assist at discharge as needed  - Refer to Case Management Department for coordinating discharge planning if the patient needs post-hospital services based on physician/advanced practitioner order or complex needs related to functional status, cognitive ability, or social support system  Outcome: Progressing     Problem: RESPIRATORY - ADULT  Goal: Achieves optimal ventilation and oxygenation  Description: INTERVENTIONS:  - Assess for changes in respiratory status  - Assess for changes in mentation and behavior  - Position to facilitate oxygenation and minimize respiratory effort  - Oxygen administered by appropriate delivery if ordered  - Initiate smoking cessation education as indicated  - Encourage broncho-pulmonary hygiene including cough, deep breathe, Incentive Spirometry  - Assess the need for suctioning and aspirate as needed  - Assess and instruct to report SOB or any respiratory difficulty  - Respiratory Therapy support as indicated  Outcome: Progressing     Problem: MOBILITY - ADULT  Goal: Maintain or return to baseline ADL function  Description: INTERVENTIONS:  -  Assess patient's ability to carry out ADLs; assess patient's baseline for ADL function and identify physical deficits which impact ability to perform ADLs (bathing, care of mouth/teeth, toileting, grooming, dressing, etc )  - Assess/evaluate cause of self-care deficits   - Assess range of motion  - Assess patient's mobility; develop plan if impaired  - Assess patient's need for assistive devices and provide as appropriate  - Encourage maximum independence but intervene and supervise when necessary  - Involve family in performance of ADLs  - Assess for home care needs following discharge   - Consider OT consult to assist with ADL evaluation and planning for discharge  - Provide patient education as appropriate  Outcome: Progressing  Goal: Maintains/Returns to pre admission functional level  Description: INTERVENTIONS:  - Perform BMAT or MOVE assessment daily    - Set and communicate daily mobility goal to care team and patient/family/caregiver  - Collaborate with rehabilitation services on mobility goals if consulted  - Perform Range of Motion  times a day  - Reposition patient every  hours    - Dangle patient  times a day  - Stand patient  times a day  - Ambulate patient  times a day  - Out of bed to chair  times a day   - Out of bed for meals  times a day  - Out of bed for toileting  - Record patient progress and toleration of activity level   Outcome: Progressing

## 2022-04-21 NOTE — ASSESSMENT & PLAN NOTE
Leukocytosis elevated from 15 to 26 with neutrophilia, monocytosis, basophilia, and immature granulocytes showed on differential and promyelocytes despite being on adequate treatment for cellulitis  On day of discharge leukocytosis remained elevated at 25  Infectious Disease was also consulted to rule out any other abnormalities  Infectious disease recommended discharging patient p o  Keflex for total 7 days antibiotics if her white blood cell count remains similar or decreased  Discussed with Hematology, recommends leukemia/lymphoma flow cytology be done before discharge  Patient given Hematology referral to follow-up as soon as possible  Notified Dr Louis Syed of patient's discharge so she can receive a prompt appointment

## 2022-04-21 NOTE — CONSULTS
Consultation - Infectious Disease   Fransisco Barrera 68 y o  female MRN: [id number]  Unit/Bed#: 2 South 202 Encounter: [id number]      IMPRESSION & RECOMMENDATIONS:   1  SIRS, POA with tachypnea and leukocytosis on admission  Admission blood cultures remain negative at 48 hours  Source appears to be #2  White count had trended down and now back up to 26 K  No other obvious source of leukocytosis on exam or medical history  Possibly representative of leukemoid reaction  -continue cefazolin IV overnight   -monitor temperature and hemodynamics  -serial exam  -recheck CBC with diff in a m   -if white count remains about the same elevation or lower, can transition to cephalexin 500 mg p o  every 6 hours to complete a total 7 day course through 04/24/2022 on discharge   -if leukocytosis persists, recommend outpatient follow-up of CBC in a week and hematology follow-up as needed outpatient     2  Left lower extremity cellulitis  Clinically improving  MRSA nares negative  Patient with similar presentation in January 2022 that resolved with cephalosporin management   -antibiotics as above  -monitor temperature and hemodynamics  -serial exam     3  Chronic kidney disease  GFR 46%  -renal dose adjust antibiotics as needed  -volume management  -monitor creatinine    4  COPD/emphysema  On home O2 protocol  No chronic steroid use  -supportive management per primary care team   -recheck BMP       Antibiotics:  Cefazolin abx D4    I have discussed the above management plan in detail with patient, RN, and Dr Yvette Lujan  Extensive review of the medical records in epic including review of the notes, radiographs, and laboratory results     HISTORY OF PRESENT ILLNESS:  Reason for Consult: 1  Sepsis 2  Cellulitis of left lower extremity 3  Leukocytosis    HPI: Fransisco Barrera is a 68y o  year old female with centrilobular emphysema, hypertension, CAD, hyperlipidemia, stage III CKD, prior pulmonary embolism, COVID-19 + and RLE cellulitis in January 2022 hospitalization who presented 4/18/22 with left foot and ankle swelling and erythema  Patient reported she picked a scab around the top of her left ankle about 2 weeks ago and then the redness and swelling started about a week and half ago  There was no purulent drainage or any drainage for that matter from the scab  She denies any known fever shaking chills at home  She was started on vancomycin and ceftriaxone and then deescalated to cefazolin  She is now currently on antibiotic day 4 and is feeling much better  However her white count has risen to 26 K today, and subsequently Infectious Disease now being consulted regarding evaluation and management of sepsis, cellulitis of left lower extremity and leukocytosis  Patient denies any fever, shaking chills, chest pain, shortness of breath, cough, nausea, vomiting, diarrhea, dysuria or any worsening left foot pain  She is able to walk  She does not have a history of gout  She has not been on any known steroid management  She is on home oxygen 3 L during the day 6 L at night      REVIEW OF SYSTEMS:  A complete review of systems is negative other than that noted in the HPI  PAST MEDICAL HISTORY:  Past Medical History:   Diagnosis Date    Breast lump     CHF (congestive heart failure) (HCC)     COPD (chronic obstructive pulmonary disease) (HCC)     Diverticulitis     Emphysema of lung (HCC)     History of chronic obstructive lung disease     Non-productive cough     SOBOE (shortness of breath on exertion)      History reviewed  No pertinent surgical history      FAMILY HISTORY:  Non-contributory    SOCIAL HISTORY:  Social History   Social History     Substance and Sexual Activity   Alcohol Use Not Currently     Social History     Substance and Sexual Activity   Drug Use No     Social History     Tobacco Use   Smoking Status Former Smoker    Packs/day: 1 50    Years: 55 00    Pack years: 82 50    Quit date: 6/5/2010    Years since quittin 8   Smokeless Tobacco Never Used   Tobacco Comment    ex cigarette smoker       ALLERGIES:  No Known Allergies    MEDICATIONS:  All current active medications have been reviewed  PHYSICAL EXAM:  Temp:  [97 8 °F (36 6 °C)-98 3 °F (36 8 °C)] 98 3 °F (36 8 °C)  HR:  [66-88] 84  Resp:  [16-20] 17  BP: (147-159)/(65-84) 158/83  SpO2:  [92 %-97 %] 92 %  Temp (24hrs), Av 1 °F (36 7 °C), Min:97 8 °F (36 6 °C), Max:98 3 °F (36 8 °C)  Current: Temperature: 98 3 °F (36 8 °C)    Intake/Output Summary (Last 24 hours) at 2022 1656  Last data filed at 2022 2124  Gross per 24 hour   Intake 1000 ml   Output 650 ml   Net 350 ml       General Appearance:  63-year-old elderly female, propped comfortably in bed, appearing well, nontoxic, and in no distress   Head:  Normocephalic, without obvious abnormality, atraumatic   Eyes:  Conjunctiva pink and sclera anicteric, both eyes   Nose: Nares normal, mucosa normal, no drainage   Throat: Oropharynx moist without lesions   Neck: Supple, symmetrical, no adenopathy, no tenderness/mass/nodules   Back:   Symmetric, no curvature, ROM normal, no CVA tenderness   Lungs:   Clear to auscultation bilaterally, respirations unlabored statting 92% on 3L NCO2   Chest Wall:  No tenderness or deformity   Heart:  RRR; no murmur, rub or gallop   Abdomen:   Soft, non-tender, non-distended, positive bowel sounds    Extremities: No cyanosis, clubbing or edema   Skin: No rashes or lesions  IV site nontender  No exquisite tenderness, fluctuance or warm erythema on palpation of left lower extremity  No fissures between the web spaces of her toes  Lymph nodes: Cervical, supraclavicular nodes normal   Neurologic: Alert and oriented times 3, extremity strength 5/5 and symmetric       LABS, IMAGING, & OTHER STUDIES:  Lab Results:  I have personally reviewed pertinent labs    Results from last 7 days   Lab Units 22  0519 22  0807 22  0532   WBC Thousand/uL 26 77* 15 85* 15 14*   HEMOGLOBIN g/dL 12 1 12 6 12 0   PLATELETS Thousands/uL 292 313 253     Results from last 7 days   Lab Units 04/20/22  0807 04/19/22  0532 04/19/22  0532 04/18/22  1306 04/18/22  1306   SODIUM mmol/L 145  --  143  --  142   POTASSIUM mmol/L 4 7   < > 4 1   < > 4 0   CHLORIDE mmol/L 107   < > 107   < > 104   CO2 mmol/L 30   < > 30   < > 32   BUN mg/dL 10   < > 11   < > 13   CREATININE mg/dL 1 00   < > 0 95   < > 1 13   EGFR ml/min/1 73sq m 54   < > 57   < > 46   CALCIUM mg/dL 8 4   < > 8 0*   < > 9 0   AST U/L  --   --   --   --  23   ALT U/L  --   --   --   --  25   ALK PHOS U/L  --   --   --   --  84    < > = values in this interval not displayed  Results from last 7 days   Lab Units 04/18/22  1806 04/18/22  1429 04/18/22  1424   BLOOD CULTURE   --  No Growth at 48 hrs  No Growth at 48 hrs  MRSA CULTURE ONLY  No Methicillin Resistant Staphlyococcus aureus (MRSA) isolated  --   --      Results from last 7 days   Lab Units 04/20/22  0807 04/18/22  1306   PROCALCITONIN ng/ml <0 05 0 05     Results from last 7 days   Lab Units 04/19/22  0532   CRP mg/L 54 6*               Imaging Studies:   04/21/2022 portable chest x-ray:  Clear lungs  04/18/2022 left foot x-ray:  Soft tissue swelling  No osseous abnormality    Other Studies:   I have personally reviewed pertinent reports

## 2022-04-22 VITALS
BODY MASS INDEX: 31.79 KG/M2 | TEMPERATURE: 98.9 F | HEIGHT: 61 IN | OXYGEN SATURATION: 97 % | DIASTOLIC BLOOD PRESSURE: 65 MMHG | WEIGHT: 168.4 LBS | HEART RATE: 69 BPM | SYSTOLIC BLOOD PRESSURE: 111 MMHG | RESPIRATION RATE: 20 BRPM

## 2022-04-22 LAB
BASOPHILS # BLD AUTO: 0.32 THOUSANDS/ΜL (ref 0–0.1)
BASOPHILS NFR BLD AUTO: 1 % (ref 0–1)
EOSINOPHIL # BLD AUTO: 0.28 THOUSAND/ΜL (ref 0–0.61)
EOSINOPHIL NFR BLD AUTO: 1 % (ref 0–6)
ERYTHROCYTE [DISTWIDTH] IN BLOOD BY AUTOMATED COUNT: 13.4 % (ref 11.6–15.1)
HCT VFR BLD AUTO: 38.8 % (ref 34.8–46.1)
HGB BLD-MCNC: 11.8 G/DL (ref 11.5–15.4)
IMM GRANULOCYTES # BLD AUTO: 0.4 THOUSAND/UL (ref 0–0.2)
IMM GRANULOCYTES NFR BLD AUTO: 2 % (ref 0–2)
LYMPHOCYTES # BLD AUTO: 2.01 THOUSANDS/ΜL (ref 0.6–4.47)
LYMPHOCYTES NFR BLD AUTO: 8 % (ref 14–44)
MCH RBC QN AUTO: 30.9 PG (ref 26.8–34.3)
MCHC RBC AUTO-ENTMCNC: 30.4 G/DL (ref 31.4–37.4)
MCV RBC AUTO: 102 FL (ref 82–98)
MONOCYTES # BLD AUTO: 2.35 THOUSAND/ΜL (ref 0.17–1.22)
MONOCYTES NFR BLD AUTO: 9 % (ref 4–12)
NEUTROPHILS # BLD AUTO: 19.96 THOUSANDS/ΜL (ref 1.85–7.62)
NEUTS SEG NFR BLD AUTO: 79 % (ref 43–75)
NRBC BLD AUTO-RTO: 0 /100 WBCS
PLATELET # BLD AUTO: 295 THOUSANDS/UL (ref 149–390)
PMV BLD AUTO: 12 FL (ref 8.9–12.7)
RBC # BLD AUTO: 3.82 MILLION/UL (ref 3.81–5.12)
WBC # BLD AUTO: 25.32 THOUSAND/UL (ref 4.31–10.16)

## 2022-04-22 PROCEDURE — 88184 FLOWCYTOMETRY/ TC 1 MARKER: CPT | Performed by: STUDENT IN AN ORGANIZED HEALTH CARE EDUCATION/TRAINING PROGRAM

## 2022-04-22 PROCEDURE — 99232 SBSQ HOSP IP/OBS MODERATE 35: CPT | Performed by: INTERNAL MEDICINE

## 2022-04-22 PROCEDURE — 99238 HOSP IP/OBS DSCHRG MGMT 30/<: CPT | Performed by: FAMILY MEDICINE

## 2022-04-22 PROCEDURE — 85025 COMPLETE CBC W/AUTO DIFF WBC: CPT | Performed by: PHYSICIAN ASSISTANT

## 2022-04-22 PROCEDURE — 94640 AIRWAY INHALATION TREATMENT: CPT

## 2022-04-22 PROCEDURE — 88185 FLOWCYTOMETRY/TC ADD-ON: CPT

## 2022-04-22 PROCEDURE — 94760 N-INVAS EAR/PLS OXIMETRY 1: CPT

## 2022-04-22 RX ORDER — CEPHALEXIN 500 MG/1
500 CAPSULE ORAL EVERY 6 HOURS SCHEDULED
Qty: 12 CAPSULE | Refills: 0 | Status: SHIPPED | OUTPATIENT
Start: 2022-04-22 | End: 2022-04-25

## 2022-04-22 RX ORDER — FLUTICASONE PROPIONATE 110 UG/1
1 AEROSOL, METERED RESPIRATORY (INHALATION) EVERY 12 HOURS SCHEDULED
Qty: 12 G | Refills: 0 | Status: SHIPPED | OUTPATIENT
Start: 2022-04-22 | End: 2022-06-06 | Stop reason: SDUPTHER

## 2022-04-22 RX ADMIN — APIXABAN 5 MG: 5 TABLET, FILM COATED ORAL at 08:02

## 2022-04-22 RX ADMIN — OXYCODONE HYDROCHLORIDE 2.5 MG: 5 TABLET ORAL at 04:19

## 2022-04-22 RX ADMIN — ACETAMINOPHEN 650 MG: 325 TABLET, FILM COATED ORAL at 11:38

## 2022-04-22 RX ADMIN — IPRATROPIUM BROMIDE AND ALBUTEROL SULFATE 3 ML: 2.5; .5 SOLUTION RESPIRATORY (INHALATION) at 07:12

## 2022-04-22 RX ADMIN — ASPIRIN 81 MG: 81 TABLET, COATED ORAL at 08:02

## 2022-04-22 RX ADMIN — UMECLIDINIUM BROMIDE AND VILANTEROL TRIFENATATE 1 PUFF: 62.5; 25 POWDER RESPIRATORY (INHALATION) at 08:02

## 2022-04-22 RX ADMIN — ACETAMINOPHEN 650 MG: 325 TABLET, FILM COATED ORAL at 05:40

## 2022-04-22 RX ADMIN — FLUTICASONE PROPIONATE 1 PUFF: 110 AEROSOL, METERED RESPIRATORY (INHALATION) at 08:02

## 2022-04-22 RX ADMIN — CEFAZOLIN SODIUM 2000 MG: 2 SOLUTION INTRAVENOUS at 01:30

## 2022-04-22 RX ADMIN — Medication 12.5 MG: at 08:02

## 2022-04-22 RX ADMIN — ATORVASTATIN CALCIUM 40 MG: 40 TABLET, FILM COATED ORAL at 08:02

## 2022-04-22 RX ADMIN — CEFAZOLIN SODIUM 2000 MG: 2 SOLUTION INTRAVENOUS at 09:06

## 2022-04-22 NOTE — PLAN OF CARE
Problem: Potential for Falls  Goal: Patient will remain free of falls  Description: INTERVENTIONS:  - Educate patient/family on patient safety including physical limitations  - Instruct patient to call for assistance with activity   - Consult OT/PT to assist with strengthening/mobility   - Keep Call bell within reach  - Keep bed low and locked with side rails adjusted as appropriate  - Keep care items and personal belongings within reach  - Initiate and maintain comfort rounds  - Make Fall Risk Sign visible to staff  - Offer Toileting every 2 Hours, in advance of need  - Initiate/Maintain bed alarm  - Obtain necessary fall risk management equipment:   Problem: PAIN - ADULT  Goal: Verbalizes/displays adequate comfort level or baseline comfort level  Description: Interventions:  - Encourage patient to monitor pain and request assistance  - Assess pain using appropriate pain scale  - Administer analgesics based on type and severity of pain and evaluate response  - Implement non-pharmacological measures as appropriate and evaluate response  - Consider cultural and social influences on pain and pain management  - Notify physician/advanced practitioner if interventions unsuccessful or patient reports new pain  Outcome: Progressing     Problem: INFECTION - ADULT  Goal: Absence or prevention of progression during hospitalization  Description: INTERVENTIONS:  - Assess and monitor for signs and symptoms of infection  - Monitor lab/diagnostic results  - Monitor all insertion sites, i e  indwelling lines, tubes, and drains  - Monitor endotracheal if appropriate and nasal secretions for changes in amount and color  - [place] appropriate cooling/warming therapies per order  - Administer medications as ordered  - Instruct and encourage patient and family to use good hand hygiene technique  - Identify and instruct in appropriate isolation precautions for identified infection/condition  Outcome: Progressing  Goal: Absence of fever/infection during neutropenic period  Description: INTERVENTIONS:  - Monitor WBC    Outcome: Progressing     Problem: SAFETY ADULT  Goal: Patient will remain free of falls  Description: INTERVENTIONS:  - Educate patient/family on patient safety including physical limitations  - Instruct patient to call for assistance with activity   - Consult OT/PT to assist with strengthening/mobility   - Keep Call bell within reach  - Keep bed low and locked with side rails adjusted as appropriate  - Keep care items and personal belongings within reach  - Initiate and maintain comfort rounds  - Make Fall Risk Sign visible to staff  - Offer Toileting every 2 Hours, in advance of need  - Initiate/Maintain bed alarm  - Obtain necessary fall risk management equipment:   - Apply yellow socks and bracelet for high fall risk patients  - Consider moving patient to room near nurses station  Outcome: Progressing  Goal: Maintain or return to baseline ADL function  Description: INTERVENTIONS:  -  Assess patient's ability to carry out ADLs; assess patient's baseline for ADL function and identify physical deficits which impact ability to perform ADLs (bathing, care of mouth/teeth, toileting, grooming, dressing, etc )  - Assess/evaluate cause of self-care deficits   - Assess range of motion  - Assess patient's mobility; develop plan if impaired  - Assess patient's need for assistive devices and provide as appropriate  - Encourage maximum independence but intervene and supervise when necessary  - Involve family in performance of ADLs  - Assess for home care needs following discharge   - Consider OT consult to assist with ADL evaluation and planning for discharge  - Provide patient education as appropriate  Outcome: Progressing  Goal: Maintains/Returns to pre admission functional level  Description: INTERVENTIONS:  - Perform BMAT or MOVE assessment daily    - Set and communicate daily mobility goal to care team and patient/family/caregiver  - Collaborate with rehabilitation services on mobility goals if consulted  - Perform Range of Motion 3 times a day  - Reposition patient every 2 hours    - Dangle patient 3 times a day  - Stand patient 3 times a day  - Ambulate patient 3 times a day  - Out of bed to chair 3 times a day   - Out of bed for meals 3 times a day  - Out of bed for toileting  - Record patient progress and toleration of activity level   Outcome: Progressing     - Apply yellow socks and bracelet for high fall risk patients  - Consider moving patient to room near nurses station  Outcome: Progressing

## 2022-04-22 NOTE — DISCHARGE INSTRUCTIONS
Cellulitis   WHAT YOU NEED TO KNOW:   Cellulitis is a skin infection caused by bacteria  Cellulitis is common and can become severe  Cellulitis usually appears on the lower legs  It can also appear on the arms, face, and other areas  Cellulitis develops when bacteria enter a crack or break in your skin, such as a scratch, bite, or cut  DISCHARGE INSTRUCTIONS:   Return to the emergency department if:   · Your wound gets larger and more painful  · You feel a crackling under your skin when you touch it  · You have purple dots or bumps on your skin, or you see bleeding under your skin  · You see red streaks coming from the infected area  Call your doctor if:   · The red, warm, swollen area gets larger  · Your fever or pain does not go away or gets worse  · The area does not get smaller after 3 days of antibiotics  · You have questions or concerns about your condition or care  Medicines: You should start to see improvement in 3 days  If cellulitis is not treated, the infection can spread through your body and become life-threatening  You may need any of the following medicines:  · Antibiotics  help treat the bacterial infection  · Acetaminophen  decreases pain and fever  It is available without a doctor's order  Ask how much to take and how often to take it  Follow directions  Read the labels of all other medicines you are using to see if they also contain acetaminophen, or ask your doctor or pharmacist  Acetaminophen can cause liver damage if not taken correctly  Do not use more than 4 grams (4,000 milligrams) total of acetaminophen in one day  · NSAIDs , such as ibuprofen, help decrease swelling, pain, and fever  This medicine is available with or without a doctor's order  NSAIDs can cause stomach bleeding or kidney problems in certain people  If you take blood thinner medicine, always ask your healthcare provider if NSAIDs are safe for you   Always read the medicine label and follow directions  · Take your medicine as directed  Contact your healthcare provider if you think your medicine is not helping or if you have side effects  Tell him or her if you are allergic to any medicine  Keep a list of the medicines, vitamins, and herbs you take  Include the amounts, and when and why you take them  Bring the list or the pill bottles to follow-up visits  Carry your medicine list with you in case of an emergency  Self-care:   · Wash the area with soap and water every day  Gently pat dry  Use bandages if directed by your healthcare provider  · Elevate the area above the level of your heart  as often as you can  This will help decrease swelling and pain  Prop the area on pillows or blankets to keep it elevated comfortably  · Place a cool, damp cloth on the area  Use clean cloths and clean water  You can do this as often as you need to  Cool, damp cloths may help decrease pain  · Apply cream or ointment as directed  These help protect the area  Most over-the-counter products, such as petroleum jelly, are good to use  Ask your healthcare provider about specific creams or ointments you should use  Prevent cellulitis:   · Do not scratch bug bites or areas of injury  You increase your risk for cellulitis by scratching these areas  · Do not share personal items, such as towels, clothing, and razors  · Clean exercise equipment  with germ-killing  before and after you use it  · Treat athlete's foot  This can help prevent the spread of a bacterial skin infection  · Wash your hands often  Use soap and water  Wash your hands after you use the bathroom, change a child's diapers, or sneeze  Wash your hands before you prepare or eat food  Use lotion to prevent dry, cracked skin  Follow up with your doctor within 3 days, or as directed:  He or she will check if your cellulitis is getting better   Write down your questions so you remember to ask them during your visits  © Copyright [employer] 2022 Information is for End User's use only and may not be sold, redistributed or otherwise used for commercial purposes  All illustrations and images included in CareNotes® are the copyrighted property of [employer]  or Jennifer Valdez  The above information is an  only  It is not intended as medical advice for individual conditions or treatments  Talk to your doctor, nurse or pharmacist before following any medical regimen to see if it is safe and effective for you  Cephalexin (By mouth)   Cephalexin ([id number]-in)  Treats infections  This medicine is a cephalosporin antibiotic  Brand Name(s): Keflex   There may be other brand names for this medicine  When This Medicine Should Not Be Used: This medicine is not right for everyone  Do not use this medicine if you had an allergic reaction to cephalexin or another cephalosporin medicine  How to Use This Medicine:   Capsule, Tablet, Tablet for Suspension, Liquid  · Your doctor will tell you how much medicine to use  Do not use more than directed  · Read and follow the patient instructions that come with this medicine  Talk to your doctor or pharmacist if you have any questions  · You may take your medicine with food or milk to avoid stomach upset  · Oral liquid: Shake well just before use  Measure the oral liquid medicine with a marked measuring spoon, oral syringe, or medicine cup  · Take all of the medicine in your prescription to clear up your infection, even if you feel better after the first few doses  · Missed dose: Take a dose as soon as you remember  If it is almost time for your next dose, wait until then and take a regular dose  Do not take extra medicine to make up for a missed dose  · Capsule or tablet: Store at room temperature away from heat, moisture, and direct light  · Oral liquid: Store in the refrigerator for 14 days  After 14 days, throw away any unused medicine   Do not freeze  Drugs and Foods to Avoid:   Ask your doctor or pharmacist before using any other medicine, including over-the-counter medicines, vitamins, and herbal products  · Some medicines and foods can affect how cephalexin works  Tell your doctor if you are also using  ? Metformin  ? Probenecid  Warnings While Using This Medicine:   · Tell your doctor if you are pregnant or breastfeeding, or if you have kidney disease, liver disease, or a history of digestive problems, such as colitis  Tell your doctor if you are allergic to penicillin  · This medicine can cause diarrhea  Call your doctor if the diarrhea becomes severe, does not stop, or is bloody  Do not take any medicine to stop diarrhea until you have talked to your doctor  Diarrhea can occur 2 months or more after you stop taking this medicine  · Tell any doctor or dentist who treats you that you are using this medicine  This medicine may affect certain medical test results  · Call your doctor if your symptoms do not improve or if they get worse  · Keep all medicine out of the reach of children  Never share your medicine with anyone  Possible Side Effects While Using This Medicine:   Call your doctor right away if you notice any of these side effects:  · Allergic reaction: Itching or hives, swelling in your face or hands, swelling or tingling in your mouth or throat, chest tightness, trouble breathing  · Blistering, peeling, red skin rash  · Severe diarrhea, especially if bloody or ongoing  · Severe stomach pain, vomiting  If you notice these less serious side effects, talk with your doctor:   · Mild diarrhea or nausea  If you notice other side effects that you think are caused by this medicine, tell your doctor  Call your doctor for medical advice about side effects   You may report side effects to FDA at 1-485-FDA-9898    © Copyright [employer] 2022 Information is for End User's use only and may not be sold, redistributed or otherwise used for commercial purposes  The above information is an  only  It is not intended as medical advice for individual conditions or treatments  Talk to your doctor, nurse or pharmacist before following any medical regimen to see if it is safe and effective for you  Fluticasone (By breathing)   Fluticasone ([id number]-sone)  Prevents asthma attacks  This medicine is a corticosteroid  Brand Name(s): Flovent, Flovent HFA   There may be other brand names for this medicine  When This Medicine Should Not Be Used: This medicine is not right for everyone  Do not use it if you had an allergic reaction to fluticasone or while you are having an asthma attack  Do not use ArmonAir® Digihaler, ArmonAir RespiClick®, Arnuity Ellipta®, or Flovent® Diskus® if you are allergic to milk proteins  How to Use This Medicine:   Liquid Under Pressure, Powder Under Pressure, Disk  · Take your medicine as directed  Your dose may need to be changed several times to find what works best for you  Never use more medicine than your doctor prescribed  · Read and follow the patient instructions that come with this medicine  Talk to your doctor or pharmacist if you have any questions  Your doctor or pharmacist should also show you how to use the inhaler  · Do not breathe into the inhaler  To inhale this medicine, breathe out fully, trying to get as much air out of the lungs as possible  Put the mouthpiece just in front of your mouth with the canister upright  · Open your mouth and breathe in slowly and deeply (like yawning), and at the same time firmly press down on the top of the canister once  · Hold your breath for about 5 to 10 seconds, and then breathe out slowly  · When you have finished all your inhalations, rinse your mouth out with water  Do not swallow the water  · Arnuity Ellipta®:   ? Do not shake the inhaler  When you are ready to use the medicine, open the cover of the inhaler   You should hear a click, and the dose counter number will change  This means the medicine is ready  ? The dose counter will turn red when the inhaler has fewer than 10 doses  Stop using the inhaler when the counter reaches 0, or 6 weeks after you open the package, whichever comes first   · Flovent® HFA:   ? Before you use the inhaler for the first time, point it away from your face and test spray into the air 4 times  Avoid spraying in your eyes  Shake the inhaler before each spray  Test spray 1 time if the inhaler has not been used for 7 days or if it has been dropped  ? When the dose counter reaches 020, you will need a refill soon  Stop using the inhaler when it reaches 0   · Flovent® Diskus®:   ? To use the inhaler, hold it level and push the lever away from you  You should hear a click and see the mouthpiece  The dose counter number should change  ? When you are finished, slide the lever back into place until it clicks  The dose counter will turn red when you have 5 or fewer doses left  ? Do not use a spacer device with the inhaler  Do not wash the inhaler  · ArmonAir RespiClick®:   ? This medicine does not require priming  Do not use it with a spacer or volume holding chamber  ? When you are ready to use the medicine, open the cover of the inhaler  You should hear a click, and the dose counter number will change  This means the medicine is ready  ? The dose counter will turn red when the inhaler has fewer than 20 doses  Stop using the inhaler when the counter reaches 0, or 30 days after you open the package, whichever comes first   · Missed dose: Take a dose as soon as you remember  If it is almost time for your next dose, wait until then and take a regular dose  Do not take extra medicine to make up for a missed dose  Do not use more than 1 dose of Arnuity Ellipta® in 1 day  · If you miss a dose of ArmonAir RespiClick®, skip the missed dose and go back to your regular dosing schedule  Do not double doses    · Store the canister at room temperature, away from heat and direct light  Do not freeze  Do not keep this medicine inside a car where it could be exposed to extreme heat or cold  Do not poke holes in the canister or throw it into a fire, even if the canister is empty  Store the medicine at room temperature in a dry place, away from heat, moisture, or light  Store the [employer] inhaler with the mouthpiece down  Drugs and Foods to Avoid:   Ask your doctor or pharmacist before using any other medicine, including over-the-counter medicines, vitamins, and herbal products  · Some medicines can affect how fluticasone works  Tell your doctor if you are using any of the following:  ? Atazanavir, clarithromycin, conivaptan, indinavir, itraconazole, ketoconazole, lopinavir, nefazodone, nelfinavir, ritonavir, saquinavir, telithromycin, troleandomycin, or voriconazole  ? Medicine to treat seizures  Warnings While Using This Medicine:   · Tell your doctor if you are pregnant or breastfeeding, or if you have liver disease, osteoporosis, cataracts, or glaucoma  Tell your doctor if you have any immune system problems or infections, including herpes simplex in your eye or tuberculosis  Tell your doctor right away if you are exposed to measles or chickenpox  · This medicine may cause the following problems:  ? Increased trouble breathing right after use (paradoxical bronchospasm), which can be life-threatening  ? Low bone mineral density, which may lead to osteoporosis  ? Cataracts, glaucoma, or other vision problems  ? Slow growth in children  ? Problems with the adrenal glands  ? Higher risk of infection, including fungus infection in the mouth (thrush)  · This medicine will not stop an asthma attack that has already started  You should have another medicine to use in case of an acute asthma attack  · If any of your asthma medicines do not seem to be working as well as usual, call your doctor right away   Do not change your doses or stop using your medicines without asking your doctor  · Tell any doctor or dentist who treats you that you are using this medicine  · Your doctor will check your progress and the effects of this medicine at regular visits  Keep all appointments  · You may need to use this medicine for 1 to 2 weeks before your asthma starts to get better  Call your doctor if your symptoms do not improve or if they get worse  · Keep all medicine out of the reach of children  Never share your medicine with anyone  Possible Side Effects While Using This Medicine:   Call your doctor right away if you notice any of these side effects:  · Allergic reaction: Itching or hives, swelling in your face or hands, swelling or tingling in your mouth or throat, chest tightness, trouble breathing  · Color changes on the skin, dark freckles, easy bruising, muscle weakness, round or puffy face  · Eye pain or vision changes  · Fever, chills, cough, runny or stuffy nose, sore throat, body aches  · Tiredness, weakness, nausea and vomiting, dizziness  · Worsening of breathing problems  If you notice these less serious side effects, talk with your doctor:   · Headache  · Sores or white patches in your mouth or throat, pain when eating or swallowing  · Weight changes (in children)  If you notice other side effects that you think are caused by this medicine, tell your doctor  Call your doctor for medical advice about side effects  You may report side effects to FDA at 5-758-FDA-5769    © Copyright [employer] 2022 Information is for End User's use only and may not be sold, redistributed or otherwise used for commercial purposes  The above information is an  only  It is not intended as medical advice for individual conditions or treatments  Talk to your doctor, nurse or pharmacist before following any medical regimen to see if it is safe and effective for you

## 2022-04-22 NOTE — DISCHARGE INSTR - AVS FIRST PAGE
Follow-up with PCP within 1 week  Take antibiotics until completion  Get CBC and BMP done  Set up appointment with Hematology

## 2022-04-22 NOTE — DISCHARGE SUMMARY
[facility] Discharge Summary - Medical  Janet Bland 68 y o  female MRN: [id number]  [place] [address] [facility] / Bed: [address] Encounter: [id number]    BRIEF OVERVIEW    Admitting Provider: Tuyet Guzmán MD  Discharge Provider: Stephen Burris  [facility]    Discharge To: Home    Outpatient Follow-Up:    [address] [place] [address] Hematology, Dr Jasper Espino    Things to address at first follow up visit:     Did the patient finish her antibiotic regimen?  Has patient set up an appointment with Dr Jasper Espino Hematology?  Please review leukemia/lymphoma flow cytometry and blood cultures   Did she get her CBC and BMP done in one week? Labs and results pending at discharge:     Leukemia/lymphoma flow cytometry, blood cultures      Admission Date: 4/18/2022           Discharge Date:  04/22/2022    Primary Discharge Diagnosis:    Principal Problem:    Sepsis ([facility] )  Active Problems:    Leukocytosis    Centrilobular emphysema ([facility] )    Essential hypertension    CAD (coronary artery disease)    Mixed hyperlipidemia    Stage 3 chronic kidney disease ([facility] )    History of pulmonary embolus (PE)  Resolved Problems:    * No resolved hospital problems  *    Consulting Providers:     Infectious disease      DETAILS OF HOSPITAL STAY    Procedures Performed/Pertinent Test Results:    HPI: 77-year-old female with past medical history of centrilobular emphysema, hypertension, CAD, hyperlipidemia, stage III CKD, and history of pulmonary embolism presents today with left foot and ankle swelling and erythema  This started week and half ago  She denies any trauma to the area  She had a recent admission in January for the same and was treated with vancomycin at that time  Copied from H&P      Hospital Course:    * Sepsis [facility])  Assessment & Plan  Patient met SIRS criteria with tachypnea and elevated white blood cell count of more than 20 with suspected source being cellulitis of left foot and ankle  Patient received vancomycin and ceftriaxone at the ED  Lactic acid was within normal limits  X-ray showed no acute abnormality but with swelling  Blood cultures were collected x2, negative at 72 hours  Procalcitonin within normal limits  Started on vancomycin and cefazolin  MRSA culture returned negative  Vancomycin discontinued  Continued on cefazolin and completed 4 days of antibiotic treatment and was transitioned to Keflex p o  to complete a total of 7 days of antibiotic treatment  On day of discharge patient's cellulitis has significantly improved and her pain had improved  Same plan as "leukocytosis"  Leukocytosis  Assessment & Plan  Leukocytosis elevated from 15 to 26 with neutrophilia, monocytosis, basophilia, and immature granulocytes showed on differential and promyelocytes despite being on adequate treatment for cellulitis  On day of discharge leukocytosis remained elevated at 25  Infectious Disease consulted to rule out any other abnormalities and recommended discharging patient p o  Keflex for total 7 days antibiotics if her white blood cell count remains similar or decreased  Discussed with Hematology, recommends leukemia/lymphoma flow cytology be done before discharge  Patient given Hematology referral to follow-up as soon as possible  Notified Dr Norma Pereira of patient's discharge so she can receive a prompt appointment  Follow up promptly with Hematology - oncology  CBC in one week  Follow with PCP  Complete 3 days of KEFLEX, 500 mg, PO, QID    History of pulmonary embolus (PE)  Assessment & Plan  History of multiple pulmonary emboli back in January of 2022  She was started on Eliquis at that time  Continue Eliquis  Stage 3 chronic kidney disease ([facility] )  Assessment & Plan  Follow up with PCP   BMP in one week   Mixed hyperlipidemia  Assessment & Plan  Continue home Lipitor    CAD (coronary artery disease)  Assessment & Plan  Continue home medication aspirin  Essential hypertension  Assessment & Plan  Continue home medications   Centrilobular emphysema [facility])  Assessment & Plan  Patient is on home oxygen at baseline for this  Her home oxygen requirements are 3 L  Continue with home medications      Physical Exam at Discharge:    General appearance: alert and oriented, in no acute distress and moderately obese  Lungs: clear to auscultation bilaterally  Heart: regular rate and rhythm, S1, S2 normal, no murmur, click, rub or gallop  Abdomen: soft, non-tender; bowel sounds normal; no masses,  no organomegaly  Extremities: Improved cellulitis of the left foot    Slight residual pitting edema  Medications:    CONTINUE these medications which have NOT CHANGED    Details   albuterol (2 5 mg/3 mL) 0 083 % nebulizer solution USE 1 VIAL IN NEBULIZER 3 TIMES DAILY  Qty: 90 vial, Refills: 11    Associated Diagnoses: Centrilobular emphysema (HCC)      albuterol (Ventolin HFA) 90 mcg/act inhaler Inhale 2 puffs 4 (four) times a day  Qty: 18 g, Refills: 2    Comments: Substitution to a formulary equivalent within the same pharmaceutical class is authorized  Associated Diagnoses: Centrilobular emphysema (HCC)      apixaban (Eliquis) 5 mg Take 1 tablet (5 mg total) by mouth 2 (two) times a day  Qty: 60 tablet, Refills: 2    Associated Diagnoses: Acute pulmonary embolism without acute cor pulmonale, unspecified pulmonary embolism type (HCC)      aspirin (ASPIR-LOW) 81 mg EC tablet Take 1 tablet by mouth daily      atorvastatin (LIPITOR) 40 mg tablet Take 1 tablet (40 mg total) by mouth daily  Qty: 90 tablet, Refills: 0    Associated Diagnoses: CAD in native artery; Dyslipidemia      Calcium 600 MG tablet Take by mouth        DAILY MULTIPLE VITAMINS PO Take by mouth      fluticasone-umeclidinium-vilanterol (Trelegy Ellipta) 100-62 5-25 MCG/INH inhaler Rinse mouth after use    Qty: 60 each, Refills: 5    Associated Diagnoses: Centrilobular emphysema (HCC)      lisinopril (ZESTRIL) 5 mg tablet Take 1 tablet by mouth once daily  Qty: 30 tablet, Refills: 0    Associated Diagnoses: Essential hypertension      metoprolol tartrate (LOPRESSOR) 25 mg tablet Take 1/2 (one-half) tablet by mouth twice daily  Qty: 60 tablet, Refills: 0    Associated Diagnoses: Essential hypertension        START taking these medications    Details   cephalexin (KEFLEX) 500 mg capsule Take 1 capsule (500 mg total) by mouth every 6 (six) hours for 3 days Start 4/22/22 at 4 pm  Qty: 12 capsule, Refills: 0    Associated Diagnoses: Cellulitis of left lower extremity      fluticasone (FLOVENT HFA) 110 MCG/ACT inhaler Inhale 1 puff every 12 (twelve) hours Rinse mouth after use  Qty: 12 g, Refills: 0    Associated Diagnoses: Centrilobular emphysema ([address] )        Allergies:    No Known Allergies    Diet restrictions:  Cardiac diet  Activity restrictions: None    Code Status: Level 1 - Full Code    Discharge Condition: stable    Discharge  Statement:    I spent more than 30 minutes discharging the patient  This time was spent on the day of discharge  I had direct contact with the patient on the day of discharge  Additional documentation is required if more than 30 minutes were spent on discharge  Linda Burleson MD , MSMS     [address] [place] [address]

## 2022-04-22 NOTE — PROGRESS NOTES
Progress Note - Infectious Disease   [facility] 68 y o  female MRN: [id number]  Unit/Bed#: 2 [address] 202 Encounter: [id number]      Impression/Plan:  1  SIRS, POA with tachypnea and leukocytosis on admission  Admission blood cultures remain negative at 72 hours  Source appears to be #2  White count had trended down and now back up to 26 > 25 K  No other obvious source of leukocytosis on exam or medical history  Possibly representative of leukemoid reaction  -transition antibiotics as below  -monitor temperature and hemodynamics  -serial exam  -As white count remains about the same elevation, can transition to cephalexin 500 mg p o  every 6 hours to complete a total 7 day course through 04/24/2022 on discharge  -Recommend outpatient follow-up of CBC in a week and hematology follow-up as needed outpatient     2  Left lower extremity cellulitis  Clinically improving  MRSA nares negative  Patient with similar presentation in January 2022 that resolved with cephalosporin management   -antibiotic transition as above  -monitor temperature and hemodynamics  -serial exam     3   Chronic kidney disease  GFR 54%  -renal dose adjust antibiotics as needed  -volume management  -monitor creatinine     4  COPD/emphysema  On home O2 protocol  No chronic steroid use  -supportive management per primary care team   -recheck BMP     Antibiotics:  Cefazolin abx D5     I have discussed the above management plan in detail with patient, RN, and Dr Elidia Mcclain  We will see patient again 4/25/22 if here  Please call ID on call physician in meantime if questions  Subjective:  Patient has no fever, chills, sweats; no nausea, vomiting, diarrhea; no cough, shortness of breath; no left foot pain  No new symptoms    Appears to be tolerating antibiotics    Objective:  Vitals:  Temp:  [98 °F (36 7 °C)-98 9 °F (37 2 °C)] 98 9 °F (37 2 °C)  HR:  [69-98] 69  Resp:  [16-20] 20  BP: (111-179)/(65-95) 111/65  SpO2:  [83 %-97 %] 97 %  Temp (24hrs), Av 4 °F (36 9 °C), Min:98 °F (36 7 °C), Max:98 9 °F (37 2 °C)  Current: Temperature: 98 9 °F (37 2 °C)    Physical Exam:   General Appearance:  68year-old pleasant, elderly female resting comfortably in bed, alert, interactive, nontoxic, no acute distress  Throat: Oropharynx moist without lesions  Lungs:   Clear to auscultation bilaterally; no wheezes, rhonchi or rales; respirations unlabored on chronic 3L NCO2 statting 97%   Heart:  RRR; no murmur   Abdomen:   Soft, non-tender, non-distended, positive bowel sounds  Extremities: No clubbing, cyanosis or edema, + LE venodynes, left foot ankle erythema fading, no warmth or tenderness on palpation   : No Avalos, no SPT   Skin: No new rashes or lesions  IV site nontender  No draining wounds noted  Labs, Imaging, & Other studies:   All pertinent labs and imaging studies were personally reviewed  Results from last 7 days   Lab Units 22  0444 22  0519 22  0807   WBC Thousand/uL 25 32* 26 77* 15 85*   HEMOGLOBIN g/dL 11 8 12 1 12 6   PLATELETS Thousands/uL 295 292 313     Results from last 7 days   Lab Units 22  0807 22  0532 22  0532 22  1306 22  1306   SODIUM mmol/L 145  --  143  --  142   POTASSIUM mmol/L 4 7   < > 4 1   < > 4 0   CHLORIDE mmol/L 107   < > 107   < > 104   CO2 mmol/L 30   < > 30   < > 32   BUN mg/dL 10   < > 11   < > 13   CREATININE mg/dL 1 00   < > 0 95   < > 1 13   EGFR ml/min/1 73sq m 54   < > 57   < > 46   CALCIUM mg/dL 8 4   < > 8 0*   < > 9 0   AST U/L  --   --   --   --  23   ALT U/L  --   --   --   --  25   ALK PHOS U/L  --   --   --   --  84    < > = values in this interval not displayed  Results from last 7 days   Lab Units 22  1806 22  1429 22  1424   BLOOD CULTURE   --  No Growth at 72 hrs  No Growth at 72 hrs     MRSA CULTURE ONLY  No Methicillin Resistant Staphlyococcus aureus (MRSA) isolated  --   --      Results from last 7 days   Lab Units 04/20/22  0807 04/18/22  1306   PROCALCITONIN ng/ml <0 05 0 05     Results from last 7 days   Lab Units 04/19/22  0532   CRP mg/L 54 6*

## 2022-04-22 NOTE — PLAN OF CARE
Problem: Potential for Falls  Goal: Patient will remain free of falls  Description: INTERVENTIONS:  - Educate patient/family on patient safety including physical limitations  - Instruct patient to call for assistance with activity   - Consult OT/PT to assist with strengthening/mobility   - Keep Call bell within reach  - Keep bed low and locked with side rails adjusted as appropriate  - Keep care items and personal belongings within reach  - Initiate and maintain comfort rounds  - Make Fall Risk Sign visible to staff  - Apply yellow socks and bracelet for high fall risk patients  - Consider moving patient to room near nurses station  Outcome: Progressing     Problem: PAIN - ADULT  Goal: Verbalizes/displays adequate comfort level or baseline comfort level  Description: Interventions:  - Encourage patient to monitor pain and request assistance  - Assess pain using appropriate pain scale  - Administer analgesics based on type and severity of pain and evaluate response  - Implement non-pharmacological measures as appropriate and evaluate response  - Consider cultural and social influences on pain and pain management  - Notify physician/advanced practitioner if interventions unsuccessful or patient reports new pain  Outcome: Progressing     Problem: INFECTION - ADULT  Goal: Absence or prevention of progression during hospitalization  Description: INTERVENTIONS:  - Assess and monitor for signs and symptoms of infection  - Monitor lab/diagnostic results  - Monitor all insertion sites, i e  indwelling lines, tubes, and drains  - Monitor endotracheal if appropriate and nasal secretions for changes in amount and color  - [place] appropriate cooling/warming therapies per order  - Administer medications as ordered  - Instruct and encourage patient and family to use good hand hygiene technique  - Identify and instruct in appropriate isolation precautions for identified infection/condition  Outcome: Progressing  Goal: Absence of fever/infection during neutropenic period  Description: INTERVENTIONS:  - Monitor WBC    Outcome: Progressing     Problem: SAFETY ADULT  Goal: Patient will remain free of falls  Description: INTERVENTIONS:  - Educate patient/family on patient safety including physical limitations  - Instruct patient to call for assistance with activity   - Consult OT/PT to assist with strengthening/mobility   - Keep Call bell within reach  - Keep bed low and locked with side rails adjusted as appropriate  - Keep care items and personal belongings within reach  - Initiate and maintain comfort rounds  - Make Fall Risk Sign visible to staff  - Apply yellow socks and bracelet for high fall risk patients  - Consider moving patient to room near nurses station  Outcome: Progressing  Goal: Maintain or return to baseline ADL function  Description: INTERVENTIONS:  -  Assess patient's ability to carry out ADLs; assess patient's baseline for ADL function and identify physical deficits which impact ability to perform ADLs (bathing, care of mouth/teeth, toileting, grooming, dressing, etc )  - Assess/evaluate cause of self-care deficits   - Assess range of motion  - Assess patient's mobility; develop plan if impaired  - Assess patient's need for assistive devices and provide as appropriate  - Encourage maximum independence but intervene and supervise when necessary  - Involve family in performance of ADLs  - Assess for home care needs following discharge   - Consider OT consult to assist with ADL evaluation and planning for discharge  - Provide patient education as appropriate  Outcome: Progressing  Goal: Maintains/Returns to pre admission functional level  Description: INTERVENTIONS:  - Perform BMAT or MOVE assessment daily    - Set and communicate daily mobility goal to care team and patient/family/caregiver     - Collaborate with rehabilitation services on mobility goals if consulted  - Out of bed for toileting  - Record patient progress and toleration of activity level   Outcome: Progressing     Problem: DISCHARGE PLANNING  Goal: Discharge to home or other facility with appropriate resources  Description: INTERVENTIONS:  - Identify barriers to discharge w/patient and caregiver  - Arrange for needed discharge resources and transportation as appropriate  - Identify discharge learning needs (meds, wound care, etc )  - Arrange for interpretive services to assist at discharge as needed  - Refer to Case Management Department for coordinating discharge planning if the patient needs post-hospital services based on physician/advanced practitioner order or complex needs related to functional status, cognitive ability, or social support system  Outcome: Progressing     Problem: RESPIRATORY - ADULT  Goal: Achieves optimal ventilation and oxygenation  Description: INTERVENTIONS:  - Assess for changes in respiratory status  - Assess for changes in mentation and behavior  - Position to facilitate oxygenation and minimize respiratory effort  - Oxygen administered by appropriate delivery if ordered  - Initiate smoking cessation education as indicated  - Encourage broncho-pulmonary hygiene including cough, deep breathe, Incentive Spirometry  - Assess the need for suctioning and aspirate as needed  - Assess and instruct to report SOB or any respiratory difficulty  - Respiratory Therapy support as indicated  Outcome: Progressing     Problem: MOBILITY - ADULT  Goal: Maintain or return to baseline ADL function  Description: INTERVENTIONS:  -  Assess patient's ability to carry out ADLs; assess patient's baseline for ADL function and identify physical deficits which impact ability to perform ADLs (bathing, care of mouth/teeth, toileting, grooming, dressing, etc )  - Assess/evaluate cause of self-care deficits   - Assess range of motion  - Assess patient's mobility; develop plan if impaired  - Assess patient's need for assistive devices and provide as appropriate  - Encourage maximum independence but intervene and supervise when necessary  - Involve family in performance of ADLs  - Assess for home care needs following discharge   - Consider OT consult to assist with ADL evaluation and planning for discharge  - Provide patient education as appropriate  Outcome: Progressing  Goal: Maintains/Returns to pre admission functional level  Description: INTERVENTIONS:  - Perform BMAT or MOVE assessment daily    - Set and communicate daily mobility goal to care team and patient/family/caregiver     - Collaborate with rehabilitation services on mobility goals if consulted  - Out of bed for toileting  - Record patient progress and toleration of activity level   Outcome: Progressing

## 2022-04-22 NOTE — CASE MANAGEMENT
Case Management Discharge Planning Note    Patient name Mark Laguna  [facility]  [place] [address] 56 MRN [id number]  : 1945 Date 2022       Current Admission Date: 2022  Current Admission Diagnosis:Sepsis [facility])   Patient Active Problem List    Diagnosis Date Noted    Leukocytosis 2022    Sepsis ([facility] ) 2022    History of pulmonary embolus (PE) 2022    COVID-19 vaccination declined 2022    Former smoker 12/10/2018    [facility] 2018    Essential hypertension 2018    CAD (coronary artery disease) 2018    Mixed hyperlipidemia 2018    Stage 3 chronic kidney disease ([facility] ) 2018      LOS (days): 4  Geometric Mean LOS (GMLOS) (days): 3 50  Days to GMLOS:-0 3     OBJECTIVE:  Risk of Unplanned Readmission Score: 11     Current admission status: Inpatient   Preferred Pharmacy:   [address] [facility]  [place] [address]  Phone: [phone] Fax: [phone]    Primary Care Provider: Olivia Woods DO    Primary Insurance: MEDICARE  Secondary Insurance: [facility]    DISCHARGE DETAILS:    Discharge planning discussed with[de-identified] Patient  Freedom of Choice: Yes  Comments - Freedom of Choice: SW following to monitor needs and assist with planning  Pt's plan is to return home with her daughter when discharged  No discharge needs expressed or anticipated by pt at this time  Offered assistance in future if needed  Requested  [address]         Is the patient interested in [facility] at discharge?: No    DME Referral Provided  Referral made for DME?: No    Other Referral/Resources/Interventions Provided:  Interventions: None Indicated    Treatment Team Recommendation: Home  Discharge Destination Plan[de-identified] Home  Transport at Discharge : Family,Automobile    IMM Given (Date):: 22  IMM Given to[de-identified] Patient (IMM reviewed with pt  Pt verbalized understanding    Pt signed IMM and pt agrees with discharge determination  Copy given to pt    Copy also placed in scan bin for chart )

## 2022-04-22 NOTE — NURSING NOTE
Pt discharged from [address] [place] [address]  IV removed prior to discharge  Pt left with all their belongings  Pt left via wheelchair accompanied by PCA  Discharge instructions gone over with patient  No prescriptions written  Prescription sent over electronically to pt's pharmacy  All questions answered

## 2022-04-22 NOTE — PLAN OF CARE
Problem: Potential for Falls  Goal: Patient will remain free of falls  Description: INTERVENTIONS:  - Educate patient/family on patient safety including physical limitations  - Instruct patient to call for assistance with activity   - Consult OT/PT to assist with strengthening/mobility   - Keep Call bell within reach  - Keep bed low and locked with side rails adjusted as appropriate  - Keep care items and personal belongings within reach  - Initiate and maintain comfort rounds  - Make Fall Risk Sign visible to staff  - Offer Toileting every x Hours, in advance of need  - Initiate/Maintain xalarm  - Obtain necessary fall risk management equipment: x  - Apply yellow socks and bracelet for high fall risk patients  - Consider moving patient to room near nurses station  4/22/2022 1027 by Renée Wilkerson RN  Outcome: Adequate for Discharge  4/22/2022 [id number] by Renée Wilkerson RN  Outcome: Progressing     Problem: PAIN - ADULT  Goal: Verbalizes/displays adequate comfort level or baseline comfort level  Description: Interventions:  - Encourage patient to monitor pain and request assistance  - Assess pain using appropriate pain scale  - Administer analgesics based on type and severity of pain and evaluate response  - Implement non-pharmacological measures as appropriate and evaluate response  - Consider cultural and social influences on pain and pain management  - Notify physician/advanced practitioner if interventions unsuccessful or patient reports new pain  4/22/2022 1027 by Renée Wilkerson RN  Outcome: Adequate for Discharge  4/22/2022 [id number] by Renée Wilkerson RN  Outcome: Progressing     Problem: INFECTION - ADULT  Goal: Absence or prevention of progression during hospitalization  Description: INTERVENTIONS:  - Assess and monitor for signs and symptoms of infection  - Monitor lab/diagnostic results  - Monitor all insertion sites, i e  indwelling lines, tubes, and drains  - Monitor endotracheal if appropriate and nasal secretions for changes in amount and color  - [place] appropriate cooling/warming therapies per order  - Administer medications as ordered  - Instruct and encourage patient and family to use good hand hygiene technique  - Identify and instruct in appropriate isolation precautions for identified infection/condition  4/22/2022 1027 by Vicki Pringle RN  Outcome: Adequate for Discharge  4/22/2022 [id number] by Vicki Pringle RN  Outcome: Progressing  Goal: Absence of fever/infection during neutropenic period  Description: INTERVENTIONS:  - Monitor WBC    4/22/2022 1027 by Vicki Pringle RN  Outcome: Adequate for Discharge  4/22/2022 [id number] by Vicki Pringle RN  Outcome: Progressing     Problem: SAFETY ADULT  Goal: Patient will remain free of falls  Description: INTERVENTIONS:  - Educate patient/family on patient safety including physical limitations  - Instruct patient to call for assistance with activity   - Consult OT/PT to assist with strengthening/mobility   - Keep Call bell within reach  - Keep bed low and locked with side rails adjusted as appropriate  - Keep care items and personal belongings within reach  - Initiate and maintain comfort rounds  - Make Fall Risk Sign visible to staff  - Offer Toileting every x Hours, in advance of need  - Initiate/Maintain xalarm  - Obtain necessary fall risk management equipment: x  - Apply yellow socks and bracelet for high fall risk patients  - Consider moving patient to room near nurses station  4/22/2022 1027 by Vicki Pringle RN  Outcome: Adequate for Discharge  4/22/2022 1113 by Vicki Pringle RN  Outcome: Progressing  Goal: Maintain or return to baseline ADL function  Description: INTERVENTIONS:  -  Assess patient's ability to carry out ADLs; assess patient's baseline for ADL function and identify physical deficits which impact ability to perform ADLs (bathing, care of mouth/teeth, toileting, grooming, dressing, etc )  - Assess/evaluate cause of self-care deficits   - Assess range of motion  - Assess patient's mobility; develop plan if impaired  - Assess patient's need for assistive devices and provide as appropriate  - Encourage maximum independence but intervene and supervise when necessary  - Involve family in performance of ADLs  - Assess for home care needs following discharge   - Consider OT consult to assist with ADL evaluation and planning for discharge  - Provide patient education as appropriate  4/22/2022 1027 by Joo Abrams RN  Outcome: Adequate for Discharge  4/22/2022 6769 by Joo Abrams RN  Outcome: Progressing  Goal: Maintains/Returns to pre admission functional level  Description: INTERVENTIONS:  - Perform BMAT or MOVE assessment daily    - Set and communicate daily mobility goal to care team and patient/family/caregiver  - Collaborate with rehabilitation services on mobility goals if consulted  - Perform Range of Motion x times a day  - Reposition patient every x hours    - Dangle patient x times a day  - Stand patient x times a day  - Ambulate patient x times a day  - Out of bed to chair x times a day   - Out of bed for meals x times a day  - Out of bed for toileting  - Record patient progress and toleration of activity level   4/22/2022 1027 by Joo Abrams RN  Outcome: Adequate for Discharge  4/22/2022 6583 by Joo Abrams RN  Outcome: Progressing     Problem: DISCHARGE PLANNING  Goal: Discharge to home or other facility with appropriate resources  Description: INTERVENTIONS:  - Identify barriers to discharge w/patient and caregiver  - Arrange for needed discharge resources and transportation as appropriate  - Identify discharge learning needs (meds, wound care, etc )  - Arrange for interpretive services to assist at discharge as needed  - Refer to Case Management Department for coordinating discharge planning if the patient needs post-hospital services based on physician/advanced practitioner order or complex needs related to functional status, cognitive ability, or social support system  4/22/2022 1027 by Kimmie Mann RN  Outcome: Adequate for Discharge  4/22/2022 7153 by Kimmie Mann RN  Outcome: Progressing     Problem: RESPIRATORY - ADULT  Goal: Achieves optimal ventilation and oxygenation  Description: INTERVENTIONS:  - Assess for changes in respiratory status  - Assess for changes in mentation and behavior  - Position to facilitate oxygenation and minimize respiratory effort  - Oxygen administered by appropriate delivery if ordered  - Initiate smoking cessation education as indicated  - Encourage broncho-pulmonary hygiene including cough, deep breathe, Incentive Spirometry  - Assess the need for suctioning and aspirate as needed  - Assess and instruct to report SOB or any respiratory difficulty  - Respiratory Therapy support as indicated  4/22/2022 1027 by Kimmie Mann RN  Outcome: Adequate for Discharge  4/22/2022 4877 by Kimmie Mann RN  Outcome: Progressing     Problem: MOBILITY - ADULT  Goal: Maintain or return to baseline ADL function  Description: INTERVENTIONS:  -  Assess patient's ability to carry out ADLs; assess patient's baseline for ADL function and identify physical deficits which impact ability to perform ADLs (bathing, care of mouth/teeth, toileting, grooming, dressing, etc )  - Assess/evaluate cause of self-care deficits   - Assess range of motion  - Assess patient's mobility; develop plan if impaired  - Assess patient's need for assistive devices and provide as appropriate  - Encourage maximum independence but intervene and supervise when necessary  - Involve family in performance of ADLs  - Assess for home care needs following discharge   - Consider OT consult to assist with ADL evaluation and planning for discharge  - Provide patient education as appropriate  4/22/2022 1027 by Sherry Turner RN  Outcome: Adequate for Discharge  4/22/2022 6994 by Sherry Turner RN  Outcome: Progressing  Goal: Maintains/Returns to pre admission functional level  Description: INTERVENTIONS:  - Perform BMAT or MOVE assessment daily    - Set and communicate daily mobility goal to care team and patient/family/caregiver  - Collaborate with rehabilitation services on mobility goals if consulted  - Perform Range of Motion x times a day  - Reposition patient every x hours    - Dangle patient x times a day  - Stand patient x times a day  - Ambulate patient x times a day  - Out of bed to chair x times a day   - Out of bed for meals x times a day  - Out of bed for toileting  - Record patient progress and toleration of activity level   4/22/2022 1027 by Sherry Turner RN  Outcome: Adequate for Discharge  4/22/2022 4331 by Sherry Turner RN  Outcome: Progressing

## 2022-04-23 LAB
BACTERIA BLD CULT: NORMAL
BACTERIA BLD CULT: NORMAL

## 2022-04-24 LAB — SCAN RESULT: NORMAL

## 2022-04-25 ENCOUNTER — TRANSITIONAL CARE MANAGEMENT (OUTPATIENT)
Dept: FAMILY MEDICINE CLINIC | Facility: CLINIC | Age: 77
End: 2022-04-25

## 2022-04-25 ENCOUNTER — OFFICE VISIT (OUTPATIENT)
Dept: FAMILY MEDICINE CLINIC | Facility: CLINIC | Age: 77
End: 2022-04-25
Payer: MEDICARE

## 2022-04-25 VITALS
OXYGEN SATURATION: 91 % | WEIGHT: 169 LBS | BODY MASS INDEX: 31.91 KG/M2 | RESPIRATION RATE: 22 BRPM | TEMPERATURE: 97.5 F | DIASTOLIC BLOOD PRESSURE: 82 MMHG | HEART RATE: 78 BPM | SYSTOLIC BLOOD PRESSURE: 160 MMHG | HEIGHT: 61 IN

## 2022-04-25 DIAGNOSIS — I25.119 CORONARY ARTERY DISEASE INVOLVING NATIVE HEART WITH ANGINA PECTORIS, UNSPECIFIED VESSEL OR LESION TYPE (HCC): ICD-10-CM

## 2022-04-25 DIAGNOSIS — Z76.89 ENCOUNTER FOR SUPPORT AND COORDINATION OF TRANSITION OF CARE: Primary | ICD-10-CM

## 2022-04-25 DIAGNOSIS — I10 ESSENTIAL HYPERTENSION: ICD-10-CM

## 2022-04-25 DIAGNOSIS — L03.116 CELLULITIS OF LEFT LOWER EXTREMITY: ICD-10-CM

## 2022-04-25 DIAGNOSIS — N18.31 STAGE 3A CHRONIC KIDNEY DISEASE (HCC): ICD-10-CM

## 2022-04-25 DIAGNOSIS — J43.2 CENTRILOBULAR EMPHYSEMA (HCC): ICD-10-CM

## 2022-04-25 DIAGNOSIS — E78.2 MIXED HYPERLIPIDEMIA: ICD-10-CM

## 2022-04-25 DIAGNOSIS — D72.829 LEUKOCYTOSIS, UNSPECIFIED TYPE: ICD-10-CM

## 2022-04-25 PROCEDURE — 99496 TRANSJ CARE MGMT HIGH F2F 7D: CPT | Performed by: FAMILY MEDICINE

## 2022-04-25 NOTE — PROGRESS NOTES
Assessment/Plan:     Diagnoses and all orders for this visit:    Encounter for support and coordination of transition of care  Cellulitis of left lower extremity  Progressing well, cellulitis resolved  Complete remaining 1 day of antibiotics  FU in 2-3 weeks to re-assess since pt had cellulitis a few months ago also  Will have CBC and BMP done on 4/29  Leukocytosis, unspecified type  Noted during hospitalization, 25 on discharge  Leukemia/lymphome flow cytometry normal  Pt will call today to schedule Heme onc follow up  No fever, no chills, no night sweats, no weight change  Centrilobular emphysema (HCC)  Stable  On 3L O2, baseline  Has PFT upcoming and pulmonology follow up    Coronary artery disease involving native heart with angina pectoris, unspecified vessel or lesion type ([facility] [address] )  Stable  On ASA 81, lipitor 40    Essential hypertension  /82 on arrival, repeat 142/80  Continue lopressor  Stage 3a chronic kidney disease (HCC)  No ANNA during admission  Await repeat BMP  Mixed hyperlipidemia  Continue lipitor        Subjective:      Patient ID: Soumya Jama is a 68 y o  female  HPI    Pt is here for TCM for recent hospitalization 4/18-4/22 for sepsis 2/2 cellulitis of L foot and ankle  Received IV abx and discharged with remaining course of abx  Things to address at first follow up visit:     · Did the patient finish her antibiotic regimen? 1 more day left   · Has patient set up an appointment with Dr Phani Taylor Hematology?  No   · Did she get her CBC and BMP done in one week? due on 4/29, pt aware     TCM Call (since 3/25/2022)     Date and time call was made  4/25/2022  9:33 AM    Hospital care reviewed  Records reviewed        Patient was hospitialized at  [address] [place] [address]        Date of Admission  04/18/22    Date of discharge  04/22/22    Diagnosis  Sepsis    Disposition  Home      TCM Call (since 3/25/2022)     None          The following portions of the patient's history were reviewed and updated as appropriate: allergies, current medications, past family history, past medical history, past social history, past surgical history and problem list     Review of Systems   Constitutional: Negative for chills and fever  Respiratory: Negative for chest tightness and shortness of breath  Cardiovascular: Negative for chest pain and palpitations  Gastrointestinal: Negative for abdominal pain  Genitourinary: Negative for dysuria  Skin: Negative for rash  Neurological: Negative for light-headedness and headaches  Psychiatric/Behavioral: Negative for dysphoric mood and suicidal ideas  The patient is not nervous/anxious  Objective:      /82 (BP Location: Left arm, Patient Position: Sitting, Cuff Size: Adult)   Pulse 78   Temp 97 5 °F (36 4 °C) (Tympanic)   Resp 22   Ht 5' 1" (1 549 m)   Wt 76 7 kg (169 lb)   SpO2 91% Comment: on 3 L  BMI 31 93 kg/m²          Physical Exam  Constitutional:       Appearance: Normal appearance  HENT:      Head: Normocephalic and atraumatic  Cardiovascular:      Rate and Rhythm: Normal rate and regular rhythm  Pulses: Normal pulses  Heart sounds: Normal heart sounds  No murmur heard  Pulmonary:      Effort: Pulmonary effort is normal  No respiratory distress  Breath sounds: Normal breath sounds  No wheezing  Abdominal:      General: Bowel sounds are normal  There is no distension  Palpations: Abdomen is soft  Tenderness: There is no abdominal tenderness  Musculoskeletal:      Right lower leg: No edema  Left lower leg: No edema  Skin:     General: Skin is warm and dry  Findings: No rash (no cellulitis noted on left foot)  Neurological:      General: No focal deficit present  Mental Status: She is alert and oriented to person, place, and time     Psychiatric:         Mood and Affect: Mood normal          Behavior: Behavior normal

## 2022-04-26 ENCOUNTER — TELEPHONE (OUTPATIENT)
Dept: HEMATOLOGY ONCOLOGY | Facility: CLINIC | Age: 77
End: 2022-04-26

## 2022-04-26 ENCOUNTER — TELEPHONE (OUTPATIENT)
Dept: FAMILY MEDICINE CLINIC | Facility: CLINIC | Age: 77
End: 2022-04-26

## 2022-04-26 NOTE — TELEPHONE ENCOUNTER
Patient stated she is moving at the end of the month and will obtain a new PCP  Please remove CFP from PCP

## 2022-04-29 ENCOUNTER — APPOINTMENT (OUTPATIENT)
Dept: LAB | Facility: HOSPITAL | Age: 77
End: 2022-04-29
Payer: MEDICARE

## 2022-04-29 DIAGNOSIS — D72.829 LEUKOCYTOSIS: ICD-10-CM

## 2022-04-29 DIAGNOSIS — I25.10 CAD (CORONARY ARTERY DISEASE): ICD-10-CM

## 2022-04-29 DIAGNOSIS — L03.116 CELLULITIS OF LEFT LOWER EXTREMITY: ICD-10-CM

## 2022-04-29 LAB
ANION GAP SERPL CALCULATED.3IONS-SCNC: 6 MMOL/L (ref 4–13)
BASOPHILS # BLD MANUAL: 0.49 THOUSAND/UL (ref 0–0.1)
BASOPHILS NFR MAR MANUAL: 3 % (ref 0–1)
BUN SERPL-MCNC: 16 MG/DL (ref 5–25)
CALCIUM SERPL-MCNC: 9.4 MG/DL (ref 8.3–10.1)
CHLORIDE SERPL-SCNC: 100 MMOL/L (ref 100–108)
CO2 SERPL-SCNC: 34 MMOL/L (ref 21–32)
CREAT SERPL-MCNC: 1.13 MG/DL (ref 0.6–1.3)
EOSINOPHIL # BLD MANUAL: 0 THOUSAND/UL (ref 0–0.4)
EOSINOPHIL NFR BLD MANUAL: 0 % (ref 0–6)
ERYTHROCYTE [DISTWIDTH] IN BLOOD BY AUTOMATED COUNT: 13.4 % (ref 11.6–15.1)
GFR SERPL CREATININE-BSD FRML MDRD: 46 ML/MIN/1.73SQ M
GLUCOSE P FAST SERPL-MCNC: 108 MG/DL (ref 65–99)
HCT VFR BLD AUTO: 41.9 % (ref 34.8–46.1)
HGB BLD-MCNC: 13 G/DL (ref 11.5–15.4)
LYMPHOCYTES # BLD AUTO: 17 % (ref 14–44)
LYMPHOCYTES # BLD AUTO: 2.77 THOUSAND/UL (ref 0.6–4.47)
MCH RBC QN AUTO: 31.2 PG (ref 26.8–34.3)
MCHC RBC AUTO-ENTMCNC: 31 G/DL (ref 31.4–37.4)
MCV RBC AUTO: 101 FL (ref 82–98)
METAMYELOCYTES NFR BLD MANUAL: 1 % (ref 0–1)
MONOCYTES # BLD AUTO: 2.12 THOUSAND/UL (ref 0–1.22)
MONOCYTES NFR BLD: 13 % (ref 4–12)
MYELOCYTES NFR BLD MANUAL: 3 % (ref 0–1)
NEUTROPHILS # BLD MANUAL: 10.25 THOUSAND/UL (ref 1.85–7.62)
NEUTS BAND NFR BLD MANUAL: 2 % (ref 0–8)
NEUTS SEG NFR BLD AUTO: 61 % (ref 43–75)
PLATELET # BLD AUTO: 315 THOUSANDS/UL (ref 149–390)
PLATELET BLD QL SMEAR: ADEQUATE
PMV BLD AUTO: 11.6 FL (ref 8.9–12.7)
POLYCHROMASIA BLD QL SMEAR: PRESENT
POTASSIUM SERPL-SCNC: 3.8 MMOL/L (ref 3.5–5.3)
RBC # BLD AUTO: 4.17 MILLION/UL (ref 3.81–5.12)
RBC MORPH BLD: PRESENT
SODIUM SERPL-SCNC: 140 MMOL/L (ref 136–145)
WBC # BLD AUTO: 16.27 THOUSAND/UL (ref 4.31–10.16)

## 2022-04-29 PROCEDURE — 36415 COLL VENOUS BLD VENIPUNCTURE: CPT

## 2022-04-29 PROCEDURE — 85007 BL SMEAR W/DIFF WBC COUNT: CPT

## 2022-04-29 PROCEDURE — 80048 BASIC METABOLIC PNL TOTAL CA: CPT

## 2022-04-29 PROCEDURE — 85027 COMPLETE CBC AUTOMATED: CPT

## 2022-05-03 ENCOUNTER — HOSPITAL ENCOUNTER (OUTPATIENT)
Dept: PULMONOLOGY | Facility: HOSPITAL | Age: 77
Discharge: HOME | End: 2022-05-03
Payer: COMMERCIAL

## 2022-05-03 DIAGNOSIS — J43.2 CENTRILOBULAR EMPHYSEMA (HCC): ICD-10-CM

## 2022-05-03 DIAGNOSIS — J96.11 CHRONIC RESPIRATORY FAILURE WITH HYPOXIA (HCC): ICD-10-CM

## 2022-05-03 PROCEDURE — 94726 PLETHYSMOGRAPHY LUNG VOLUMES: CPT | Performed by: INTERNAL MEDICINE

## 2022-05-03 PROCEDURE — 94726 PLETHYSMOGRAPHY LUNG VOLUMES: CPT

## 2022-05-03 PROCEDURE — 94060 EVALUATION OF WHEEZING: CPT

## 2022-05-03 PROCEDURE — 94729 DIFFUSING CAPACITY: CPT | Performed by: INTERNAL MEDICINE

## 2022-05-03 PROCEDURE — 94760 N-INVAS EAR/PLS OXIMETRY 1: CPT

## 2022-05-03 PROCEDURE — 94060 EVALUATION OF WHEEZING: CPT | Performed by: INTERNAL MEDICINE

## 2022-05-03 PROCEDURE — 94729 DIFFUSING CAPACITY: CPT

## 2022-05-03 RX ORDER — ALBUTEROL SULFATE 2.5 MG/3ML
2.5 SOLUTION RESPIRATORY (INHALATION) ONCE
Status: COMPLETED | OUTPATIENT
Start: 2022-05-03 | End: 2022-05-03

## 2022-05-03 RX ADMIN — ALBUTEROL SULFATE 2.5 MG: 2.5 SOLUTION RESPIRATORY (INHALATION) at 11:22

## 2022-05-06 DIAGNOSIS — I10 ESSENTIAL HYPERTENSION: ICD-10-CM

## 2022-05-06 DIAGNOSIS — I25.10 CAD IN NATIVE ARTERY: ICD-10-CM

## 2022-05-06 DIAGNOSIS — E78.5 DYSLIPIDEMIA: ICD-10-CM

## 2022-05-06 RX ORDER — ATORVASTATIN CALCIUM 40 MG/1
40 TABLET, FILM COATED ORAL DAILY
Qty: 90 TABLET | Refills: 0 | Status: SHIPPED | OUTPATIENT
Start: 2022-05-06 | End: 2022-06-06 | Stop reason: SDUPTHER

## 2022-05-06 RX ORDER — LISINOPRIL 5 MG/1
5 TABLET ORAL DAILY
Qty: 30 TABLET | Refills: 0 | Status: SHIPPED | OUTPATIENT
Start: 2022-05-06 | End: 2022-06-06 | Stop reason: SDUPTHER

## 2022-05-09 ENCOUNTER — OFFICE VISIT (OUTPATIENT)
Dept: PULMONOLOGY | Facility: MEDICAL CENTER | Age: 77
End: 2022-05-09
Payer: COMMERCIAL

## 2022-05-09 VITALS
HEART RATE: 95 BPM | DIASTOLIC BLOOD PRESSURE: 78 MMHG | HEIGHT: 61 IN | TEMPERATURE: 98.4 F | OXYGEN SATURATION: 91 % | SYSTOLIC BLOOD PRESSURE: 136 MMHG | WEIGHT: 165 LBS | RESPIRATION RATE: 12 BRPM | BODY MASS INDEX: 31.15 KG/M2

## 2022-05-09 DIAGNOSIS — J96.11 CHRONIC RESPIRATORY FAILURE WITH HYPOXIA (HCC): ICD-10-CM

## 2022-05-09 DIAGNOSIS — J43.2 CENTRILOBULAR EMPHYSEMA (HCC): Primary | ICD-10-CM

## 2022-05-09 DIAGNOSIS — I82.890 ACUTE EMBOLISM AND THROMBOSIS OF OTHER SPECIFIED VEINS: ICD-10-CM

## 2022-05-09 DIAGNOSIS — I26.99 ACUTE PULMONARY EMBOLISM WITHOUT ACUTE COR PULMONALE, UNSPECIFIED PULMONARY EMBOLISM TYPE (HCC): ICD-10-CM

## 2022-05-09 PROCEDURE — 99214 OFFICE O/P EST MOD 30 MIN: CPT | Performed by: PHYSICIAN ASSISTANT

## 2022-05-09 NOTE — PROGRESS NOTES
Assessment/Plan:    Problem List Items Addressed This Visit        Respiratory    Centrilobular emphysema ([facility] [address] ) - Primary    Relevant Orders    Ambulatory Referral to Pulmonary Rehabilitation    Chronic respiratory failure with hypoxia ([facility] )    Relevant Orders    Home Oxygen with Portability      Other Visit Diagnoses     Acute pulmonary embolism without acute cor pulmonale, unspecified pulmonary embolism type ([facility] [address] )        Relevant Orders    NM lung scan perfusion single breath    Thrombosis Panel    Acute embolism and thrombosis of other specified veins         Relevant Orders    Thrombosis Panel            Plan for today/next follow-up:    Acute Bilateral Pulmonary Emboli:  -continue Eliquis 1 Tab twice daily  -call office with any bleeding issues  -avoid risk of Trauma  -follow up VQ scan study Call central scheduling at [phone] to schedule your follow up test   -proceed to lab to get thrombosis panel, hold Eliquis for 2 days prior to testing then resume Eliquis right after testing pending results of bloodwork  Severe Emphysema  - FEV1/FVC/ FEV1 47%/38%  -Continue Trelegy Ellipta  -continue albuterol as neede for wheezing or shortness of breath  -Call central scheduling at [phone] to schedule your follow up test   -attend pulmonary rehab  Chronic Hypoxemic Respriatory Failure  -Continue 3 L resting, walking, and night time 4-6  -she has portable tanks with conserving device  -has home concentrator    Follow up Further on with Pulmonologist in your next hometown in [place]    Return if symptoms worsen or fail to improve  All questions are answered to the patient's satisfaction and understanding  She verbalizes understanding  She is encouraged to call with any further questions or concerns  ______________________________________________________________________    Chief Complaint:   No chief complaint on file        Patient ID: Thelma Milner is a 68 y o  y o  female has a past medical history of Breast lump, CHF (congestive heart failure) ([facility] [address] ), COPD (chronic obstructive pulmonary disease) ([facility] [address] ), Diverticulitis, Emphysema of lung ([facility] [address] ), History of chronic obstructive lung disease, Non-productive cough, and SOBOE (shortness of breath on exertion)  5/9/2022  Patient presents today for follow-up visit for:  Valerie Perez    Was recently hosptalized w/ COVID 1/22-1/26 and acute on Chronic Hypoxemic Respiratory failure / Severe COPD / Emphysema and had B/L Pulmolnary Emboli w/ Mod - Severe COPD  Also had ANNA in hospital on CKD 3   Here in office is 86% on RA resting and desats to 78 RA amb  Improves to 92% w/ 3 L amb She is and chronically has been on 02 therapy for approx 10 years since 2012  These past couple of years the patient has remained a shut in due to [place] 19 and not wanting exposure  She remains on Eliquis for COVID provoked PE  She reports she is moving from the area to go live with her daughter in [place] to be close to her late  and daughters resting places to be able to go and visit, and namely to be with family  She has no bleeding issues  She would like to come off of Eliquis  We discussed being off of Eliquis x 2 days then getting thrombosis panel  She did recently have vascular study of BLE w/ negative results  In lieu of CTA PE study due to COVID related production decrease of IV contrast in [place], there is a national shortage of iohexol IV contrast   We discussed alternative testing w/ NMVQ scan  She is agreeable to this  If negative and normal thrombosis panel we discussed she could most likely come off of Eliquis  She is planning on finding a local pulmonolgist in [facility] where she will reside      Imaging Reviewed: CTA PE study 1/23/22:        CXR 1/22/22:      PFT:    PSG:  Smoking history:   Social History     Tobacco Use   Smoking Status Former Smoker    Packs/day: 1 50    Years: 55 00    Pack years: 82 50    Quit date: 6/5/2010    Years since quittin 9   Smokeless Tobacco Never Used   Tobacco Comment    ex cigarette smoker     Respiratory History: Severe Emphysema  Oxygen Therapy: chronic 3 L 02   DME Company: [email or web address]      Review of Systems   Constitutional: Negative for activity change, appetite change, chills, fatigue and fever  HENT: Negative for postnasal drip, rhinorrhea, sinus pressure and sinus pain  Eyes: Negative for visual disturbance  Respiratory: Positive for shortness of breath  Negative for apnea, cough, chest tightness, wheezing and stridor  Cardiovascular: Negative for chest pain and leg swelling  Gastrointestinal: Negative for diarrhea, nausea and vomiting  Endocrine: Negative for cold intolerance and heat intolerance  Musculoskeletal: Negative for arthralgias, back pain, joint swelling and myalgias  Skin: Negative for color change  Neurological: Negative for syncope and light-headedness  Hematological: Negative for adenopathy  Psychiatric/Behavioral: Negative for confusion and sleep disturbance  The following portions of the patient's history were reviewed and updated as appropriate: allergies, current medications, past family history, past medical history, past social history, past surgical history and problem list     Smoking history: She reports that she quit smoking about 11 years ago  She has a 82 50 pack-year smoking history  She has never used smokeless tobacco   Social history: She reports that she quit smoking about 11 years ago  She has a 82 50 pack-year smoking history  She has never used smokeless tobacco  She reports previous alcohol use  She reports that she does not use drugs    Past Medical History:   Diagnosis Date    Breast lump     [facility]     COPD (chronic obstructive pulmonary disease) [facility]     Diverticulitis     Emphysema of lung ([facility]     History of chronic obstructive lung disease     Non-productive cough     SOBOE (shortness of breath on exertion)      History reviewed  No pertinent surgical history  Family History   Problem Relation Age of Onset    Stroke Mother     Diabetes Father     Heart disease Father     Emphysema Family     Stroke Family         syndrome    Diabetes Sister      Immunization History   Administered Date(s) Administered    Influenza Quadrivalent Preservative Free 3 years and older IM 03/25/2014    Influenza Split High Dose Preservative Free IM 12/06/2016    Influenza, high dose seasonal 0 7 mL 02/07/2022    Influenza, seasonal, injectable 12/17/2012    Pneumococcal Polysaccharide PPV23 12/17/2012    Tdap 12/17/2012     Current Outpatient Medications   Medication Sig Dispense Refill    albuterol (2 5 mg/3 mL) 0 083 % nebulizer solution USE 1 VIAL IN NEBULIZER 3 TIMES DAILY 90 vial 11    albuterol (Ventolin HFA) 90 mcg/act inhaler Inhale 2 puffs 4 (four) times a day 18 g 2    apixaban (Eliquis) 5 mg Take 1 tablet (5 mg total) by mouth 2 (two) times a day 60 tablet 2    aspirin (ASPIR-LOW) 81 mg EC tablet Take 1 tablet by mouth daily      atorvastatin (LIPITOR) 40 mg tablet Take 1 tablet (40 mg total) by mouth daily 90 tablet 0    Calcium 600 MG tablet Take by mouth        DAILY MULTIPLE VITAMINS PO Take by mouth      fluticasone-umeclidinium-vilanterol (Trelegy Ellipta) 100-62 5-25 MCG/INH inhaler Rinse mouth after use  60 each 5    lisinopril (ZESTRIL) 5 mg tablet Take 1 tablet (5 mg total) by mouth daily 30 tablet 0    metoprolol tartrate (LOPRESSOR) 25 mg tablet Take 0 5 tablets (12 5 mg total) by mouth 2 (two) times a day 60 tablet 0    fluticasone (FLOVENT HFA) 110 MCG/ACT inhaler Inhale 1 puff every 12 (twelve) hours Rinse mouth after use  (Patient not taking: Reported on 5/9/2022 ) 12 g 0     No current facility-administered medications for this visit  Allergies: Patient has no known allergies      Objective:  Vitals:    05/09/22 1407   BP: 136/78   Pulse: 95   Resp: 12   Temp: 98 4 °F (36 9 °C) TempSrc: Temporal   SpO2: 91%   Weight: 74 8 kg (165 lb)   Height: 5' 1" (1 549 m)   Oxygen Therapy  SpO2: 91 % (on 3l pulse)    Wt Readings from Last 3 Encounters:   05/09/22 74 8 kg (165 lb)   04/25/22 76 7 kg (169 lb)   04/18/22 76 4 kg (168 lb 6 4 oz)     Body mass index is 31 18 kg/m²  Physical Exam  Constitutional:       Appearance: She is well-developed  HENT:      Head: Normocephalic and atraumatic  Eyes:      Conjunctiva/sclera: Conjunctivae normal       Pupils: Pupils are equal, round, and reactive to light  Cardiovascular:      Rate and Rhythm: Normal rate and regular rhythm  Heart sounds: Normal heart sounds  Pulmonary:      Effort: Pulmonary effort is normal  No respiratory distress  Breath sounds: Normal breath sounds  No wheezing or rales  Comments: 95% 3 L NC   Chest:      Chest wall: No tenderness  Abdominal:      General: Bowel sounds are normal       Palpations: Abdomen is soft  Musculoskeletal:         General: Normal range of motion  Cervical back: Normal range of motion and neck supple  Skin:     General: Skin is warm and dry  Neurological:      Mental Status: She is alert and oriented to person, place, and time           Lab Review:   Appointment on 04/29/2022   Component Date Value    WBC 04/29/2022 16 27*    RBC 04/29/2022 4 17     Hemoglobin 04/29/2022 13 0     Hematocrit 04/29/2022 41 9     MCV 04/29/2022 101*    MCH 04/29/2022 31 2     MCHC 04/29/2022 31 0*    RDW 04/29/2022 13 4     MPV 04/29/2022 11 6     Platelets 98/25/5381 315     Sodium 04/29/2022 140     Potassium 04/29/2022 3 8     Chloride 04/29/2022 100     CO2 04/29/2022 34*    ANION GAP 04/29/2022 6     BUN 04/29/2022 16     Creatinine 04/29/2022 1 13     Glucose, Fasting 04/29/2022 108*    Calcium 04/29/2022 9 4     eGFR 04/29/2022 46     Segmented % 04/29/2022 61     Bands % 04/29/2022 2     Lymphocytes % 04/29/2022 17     Monocytes % 04/29/2022 13*    Eosinophils, % 04/29/2022 0     Basophils % 04/29/2022 3*    Metamyelocytes% 04/29/2022 1     Myelocytes % 04/29/2022 3*    Absolute Neutrophils 04/29/2022 10 25*    Lymphocytes Absolute 04/29/2022 2 77     Monocytes Absolute 04/29/2022 2 12*    Eosinophils Absolute 04/29/2022 0 00     Basophils Absolute 04/29/2022 0 49*    RBC Morphology 04/29/2022 Present     Polychromasia 04/29/2022 Present     Platelet Estimate 90/50/2628 Adequate    Admission on 04/18/2022, Discharged on 04/22/2022   Component Date Value    WBC 04/18/2022 20 19*    RBC 04/18/2022 4 17     Hemoglobin 04/18/2022 13 3     Hematocrit 04/18/2022 41 7     MCV 04/18/2022 100*    MCH 04/18/2022 31 9     MCHC 04/18/2022 31 9     RDW 04/18/2022 13 7     MPV 04/18/2022 12 3     Platelets 33/72/1892 320     nRBC 04/18/2022 0     Neutrophils Relative 04/18/2022 74     Immat GRANS % 04/18/2022 2     Lymphocytes Relative 04/18/2022 10*    Monocytes Relative 04/18/2022 11     Eosinophils Relative 04/18/2022 1     Basophils Relative 04/18/2022 2*    Neutrophils Absolute 04/18/2022 14 84*    Immature Grans Absolute 04/18/2022 0 44*    Lymphocytes Absolute 04/18/2022 2 09     Monocytes Absolute 04/18/2022 2 30*    Eosinophils Absolute 04/18/2022 0 19     Basophils Absolute 04/18/2022 0 33*    Sodium 04/18/2022 142     Potassium 04/18/2022 4 0     Chloride 04/18/2022 104     CO2 04/18/2022 32     ANION GAP 04/18/2022 6     BUN 04/18/2022 13     Creatinine 04/18/2022 1 13     Glucose 04/18/2022 94     Calcium 04/18/2022 9 0     Corrected Calcium 04/18/2022 9 8     AST 04/18/2022 23     ALT 04/18/2022 25     Alkaline Phosphatase 04/18/2022 84     Total Protein 04/18/2022 7 3     Albumin 04/18/2022 3 0*    Total Bilirubin 04/18/2022 0 34     eGFR 04/18/2022 46     Sed Rate 04/18/2022 65*    Magnesium 04/18/2022 2 0     Color, UA 04/18/2022 Yellow     Clarity, UA 04/18/2022 Clear     Specific [place], UA 04/18/2022 1 025     pH, UA 04/18/2022 6 0     Leukocytes, UA 04/18/2022 Negative     Nitrite, UA 04/18/2022 Negative     Protein, UA 04/18/2022 Negative     Glucose, UA 04/18/2022 Negative     Ketones, UA 04/18/2022 Negative     Urobilinogen, UA 04/18/2022 0 2     Bilirubin, UA 04/18/2022 Negative     Blood, UA 04/18/2022 Small*    SARS-CoV-2 04/18/2022 Negative     INFLUENZA A PCR 04/18/2022 Negative     INFLUENZA B PCR 04/18/2022 Negative     RSV PCR 04/18/2022 Negative     LACTIC ACID 04/18/2022 0 7     Blood Culture 04/18/2022 No Growth After 5 Days   Blood Culture 04/18/2022 No Growth After 5 Days       Procalcitonin 04/18/2022 0 05     MRSA Culture Only 04/18/2022 No Methicillin Resistant Staphlyococcus aureus (MRSA) isolated     RBC, UA 04/18/2022 4-10*    WBC, UA 04/18/2022 0-1     Epithelial Cells 04/18/2022 Occasional     Bacteria, UA 04/18/2022 Occasional     Ventricular Rate 04/18/2022 70     Atrial Rate 04/18/2022 70     GA Interval 04/18/2022 168     QRSD Interval 04/18/2022 80     QT Interval 04/18/2022 388     QTC Interval 04/18/2022 419     P Axis 04/18/2022 77     QRS Axis 04/18/2022 23     T Wave Axis 04/18/2022 39     Sodium 04/19/2022 143     Potassium 04/19/2022 4 1     Chloride 04/19/2022 107     CO2 04/19/2022 30     ANION GAP 04/19/2022 6     BUN 04/19/2022 11     Creatinine 04/19/2022 0 95     Glucose 04/19/2022 96     Calcium 04/19/2022 8 0*    eGFR 04/19/2022 57     WBC 04/19/2022 15 14*    RBC 04/19/2022 3 87     Hemoglobin 04/19/2022 12 0     Hematocrit 04/19/2022 41 5     MCV 04/19/2022 107*    MCH 04/19/2022 31 0     MCHC 04/19/2022 28 9*    RDW 04/19/2022 13 7     MPV 04/19/2022 12 5     Platelets 75/84/9393 253     nRBC 04/19/2022 0     Neutrophils Relative 04/19/2022 69     Immat GRANS % 04/19/2022 2     Lymphocytes Relative 04/19/2022 14     Monocytes Relative 04/19/2022 11     Eosinophils Relative 04/19/2022 2     Basophils Relative 04/19/2022 2*    Neutrophils Absolute 04/19/2022 10 34*    Immature Grans Absolute 04/19/2022 0 34*    Lymphocytes Absolute 04/19/2022 2 08     Monocytes Absolute 04/19/2022 1 73*    Eosinophils Absolute 04/19/2022 0 33     Basophils Absolute 04/19/2022 0 32*    CRP 04/19/2022 54 6*    Sed Rate 04/19/2022 30*    Sodium 04/20/2022 145     Potassium 04/20/2022 4 7     Chloride 04/20/2022 107     CO2 04/20/2022 30     ANION GAP 04/20/2022 8     BUN 04/20/2022 10     Creatinine 04/20/2022 1 00     Glucose 04/20/2022 105     Calcium 04/20/2022 8 4     eGFR 04/20/2022 54     WBC 04/20/2022 15 85*    RBC 04/20/2022 3 98     Hemoglobin 04/20/2022 12 6     Hematocrit 04/20/2022 41 8     MCV 04/20/2022 105*    MCH 04/20/2022 31 7     MCHC 04/20/2022 30 1*    RDW 04/20/2022 13 5     MPV 04/20/2022 11 7     Platelets 81/05/9458 313     Vancomycin Tr 04/20/2022 9 9*    Sed Rate 04/20/2022 67*    Segmented % 04/20/2022 73     Lymphocytes % 04/20/2022 15     Monocytes % 04/20/2022 1*    Eosinophils, % 04/20/2022 1     Basophils % 04/20/2022 3*    Promyelocytes % 04/20/2022 3*    Atypical Lymphocytes % 04/20/2022 4*    Absolute Neutrophils 04/20/2022 11 57*    Lymphocytes Absolute 04/20/2022 2 38     Monocytes Absolute 04/20/2022 0 16     Eosinophils Absolute 04/20/2022 0 16     Basophils Absolute 04/20/2022 0 48*    RBC Morphology 04/20/2022 Present     Basophilic Stippling 74/54/2812 Present     Platelet Estimate 80/56/4480 Adequate     Large Platelet 22/40/4985 Present     Uric Acid 04/20/2022 3 7     WBC 04/21/2022 26 77*    RBC 04/21/2022 3 75*    Hemoglobin 04/21/2022 12 1     Hematocrit 04/21/2022 39 9     MCV 04/21/2022 106*    MCH 04/21/2022 32 3     MCHC 04/21/2022 30 3*    RDW 04/21/2022 13 5     MPV 04/21/2022 12 2     Platelets 17/60/6767 292     nRBC 04/21/2022 0     Neutrophils Relative 04/21/2022 80*    Immat GRANS % 04/21/2022 2     Lymphocytes Relative 04/21/2022 6*    Monocytes Relative 04/21/2022 9     Eosinophils Relative 04/21/2022 1     Basophils Relative 04/21/2022 2*    Neutrophils Absolute 04/21/2022 21 32*    Immature Grans Absolute 04/21/2022 >0 50*    Lymphocytes Absolute 04/21/2022 1 65     Monocytes Absolute 04/21/2022 2 48*    Eosinophils Absolute 04/21/2022 0 32     Basophils Absolute 04/21/2022 0 46*    Procalcitonin 04/20/2022 <0 05     Scan Result 04/22/2022 SEE WRITTEN REPORT     WBC 04/22/2022 25 32*    RBC 04/22/2022 3 82     Hemoglobin 04/22/2022 11 8     Hematocrit 04/22/2022 38 8     MCV 04/22/2022 102*    MCH 04/22/2022 30 9     MCHC 04/22/2022 30 4*    RDW 04/22/2022 13 4     MPV 04/22/2022 12 0     Platelets 38/50/1252 295     nRBC 04/22/2022 0     Neutrophils Relative 04/22/2022 79*    Immat GRANS % 04/22/2022 2     Lymphocytes Relative 04/22/2022 8*    Monocytes Relative 04/22/2022 9     Eosinophils Relative 04/22/2022 1     Basophils Relative 04/22/2022 1     Neutrophils Absolute 04/22/2022 19 96*    Immature Grans Absolute 04/22/2022 0 40*    Lymphocytes Absolute 04/22/2022 2 01     Monocytes Absolute 04/22/2022 2 35*    Eosinophils Absolute 04/22/2022 0 28     Basophils Absolute 04/22/2022 0 32*   Appointment on 04/04/2022   Component Date Value    Cholesterol 04/04/2022 194     Triglycerides 04/04/2022 122     HDL, Direct 04/04/2022 55     LDL Calculated 04/04/2022 115*    Hepatitis C Ab 04/04/2022 Non-reactive        Diagnostics:  No orders to display            ESS:    XR chest portable    Result Date: 1/23/2022  Narrative: CHEST INDICATION:   dyspnea  Patient has confirmed COVID-19  Positive on 1/22/2022  COMPARISON:  Chest radiograph from 12/27/2018 and chest CT from 6/3/2019  EXAM PERFORMED/VIEWS:  XR CHEST PORTABLE FINDINGS: Cardiomediastinal silhouette appears unremarkable  Mild bilateral ground glass opacity   No effusion or pneumothorax Osseous structures appear within normal limits for patient age  Impression: Mild bilateral ground glass opacity due to Covid-19  Workstation performed: [id number]     CTA chest pe study    Result Date: 1/23/2022  Narrative: CTA - CHEST WITH IV CONTRAST - PULMONARY ANGIOGRAM INDICATION:   Pulmonary embolism (PE) suspected, positive D-dimer COVID, DDIMER, R/O PE  COMPARISON: None  TECHNIQUE: CTA examination of the chest was performed using angiographic technique according to a protocol specifically tailored to evaluate for pulmonary embolism  Axial, sagittal, and coronal 2D reformatted images were created from the source data and  submitted for interpretation  In addition, coronal 3D MIP postprocessing was performed on the acquisition scanner  Radiation dose length product (DLP) for this visit:  510 mGy-cm   This examination, like all CT scans performed in the [facility], was performed utilizing techniques to minimize radiation dose exposure, including the use of iterative reconstruction and automated exposure control  IV Contrast:  100 mL of iohexol (OMNIPAQUE)  FINDINGS: PULMONARY ARTERIAL TREE:  Filling defect within several segmental and subsegmental pulmonary artery branches supplying the right and left lower lobes  Clementine Sequin LUNGS:  Emphysematous changes  Passive right lower lobe atelectasis  There is no tracheal or endobronchial lesion  PLEURA:  Small right pleural effusion  HEART/GREAT VESSELS:  Unremarkable for patient's age  MEDIASTINUM AND NEREYDA:  Unremarkable  CHEST WALL AND LOWER NECK:   Unremarkable  VISUALIZED STRUCTURES IN THE UPPER ABDOMEN:  Unremarkable  OSSEOUS STRUCTURES:  No acute fracture or destructive osseous lesion  Impression: Filling defects within segmental and subsegmental pulmonary branches supplying the bilateral lower lobes consistent acute pulmonary embolus   Small right pleural effusion Findings discussed with Dr Kaylynn Ferraro at 1130 pm, 1/23/21 Workstation performed: [id number] Portions of the record may have been created with voice recognition software  Occasional wrong word or "sound a like" substitutions may have occurred due to the inherent limitations of voice recognition software  Read the chart carefully and recognize, using context, where substitutions have occurred      Electronically Signed by Paola Engle PA-C

## 2022-05-09 NOTE — PATIENT INSTRUCTIONS
Plan for today/next follow-up:    Acute Bilateral Pulmonary Emboli:  -continue Eliquis 1 Tab twice daily  -call office with any bleeding issues  -avoid risk of Trauma  -follow up VQ scan study Call central scheduling at [phone] to schedule your follow up test   -proceed to lab to get thrombosis panel, hold Eliquis for 2 days prior to testing then resume Eliquis right after testing pending results of bloodwork  Severe Emphysema  - FEV1/FVC/ FEV1 47%/38%  -Continue Trelegy Ellipta  -continue albuterol as neede for wheezing or shortness of breath  -Call central scheduling at [phone] to schedule your follow up test   -attend pulmonary rehab  Chronic Hypoxemic Respriatory Failure  -Continue 3 L resting, walking, and night time 4-6  -she has portable tanks with conserving device  -has home concentrator    Follow up Further on with Pulmonologist in your next hometown in [facility]

## 2022-05-12 ENCOUNTER — HOSPITAL ENCOUNTER (OUTPATIENT)
Dept: RADIOLOGY | Facility: HOSPITAL | Age: 77
Discharge: HOME | End: 2022-05-12
Payer: COMMERCIAL

## 2022-05-12 ENCOUNTER — APPOINTMENT (OUTPATIENT)
Dept: LAB | Facility: HOSPITAL | Age: 77
End: 2022-05-12
Payer: COMMERCIAL

## 2022-05-12 DIAGNOSIS — I26.99 PE (PULMONARY THROMBOEMBOLISM) (HCC): ICD-10-CM

## 2022-05-12 DIAGNOSIS — J43.2 CENTRILOBULAR EMPHYSEMA (HCC): ICD-10-CM

## 2022-05-12 DIAGNOSIS — I26.99 ACUTE PULMONARY EMBOLISM WITHOUT ACUTE COR PULMONALE, UNSPECIFIED PULMONARY EMBOLISM TYPE (HCC): ICD-10-CM

## 2022-05-12 DIAGNOSIS — J43.2 CENTRILOBULAR EMPHYSEMA (HCC): Primary | ICD-10-CM

## 2022-05-12 DIAGNOSIS — I82.890 ACUTE EMBOLISM AND THROMBOSIS OF OTHER SPECIFIED VEINS: ICD-10-CM

## 2022-05-12 PROCEDURE — 85306 CLOT INHIBIT PROT S FREE: CPT

## 2022-05-12 PROCEDURE — 85613 RUSSELL VIPER VENOM DILUTED: CPT

## 2022-05-12 PROCEDURE — 71046 X-RAY EXAM CHEST 2 VIEWS: CPT

## 2022-05-12 PROCEDURE — 86146 BETA-2 GLYCOPROTEIN ANTIBODY: CPT

## 2022-05-12 PROCEDURE — 85303 CLOT INHIBIT PROT C ACTIVITY: CPT

## 2022-05-12 PROCEDURE — 85670 THROMBIN TIME PLASMA: CPT

## 2022-05-12 PROCEDURE — 85305 CLOT INHIBIT PROT S TOTAL: CPT

## 2022-05-12 PROCEDURE — 36415 COLL VENOUS BLD VENIPUNCTURE: CPT

## 2022-05-12 PROCEDURE — 85300 ANTITHROMBIN III ACTIVITY: CPT

## 2022-05-12 PROCEDURE — A9540 TC99M MAA: HCPCS

## 2022-05-12 PROCEDURE — 85732 THROMBOPLASTIN TIME PARTIAL: CPT

## 2022-05-12 PROCEDURE — 78582 LUNG VENTILAT&PERFUS IMAGING: CPT

## 2022-05-12 PROCEDURE — 86147 CARDIOLIPIN ANTIBODY EA IG: CPT

## 2022-05-12 PROCEDURE — 85705 THROMBOPLASTIN INHIBITION: CPT

## 2022-05-13 LAB
B2 GLYCOPROT1 IGA SERPL IA-ACNC: 5.7
B2 GLYCOPROT1 IGG SERPL IA-ACNC: 1.5
B2 GLYCOPROT1 IGM SERPL IA-ACNC: <2.9
CARDIOLIPIN IGA SER IA-ACNC: 9.2
CARDIOLIPIN IGG SER IA-ACNC: 2.2
CARDIOLIPIN IGM SER IA-ACNC: <0.8
DEPRECATED AT III PPP: 93 % OF NORMAL (ref 92–136)

## 2022-05-14 LAB
APTT SCREEN TO CONFIRM RATIO: 1.01 RATIO (ref 0–1.34)
CONFIRM APTT/NORMAL: 35.3 SEC (ref 0–47.6)
LA PPP-IMP: NORMAL
PROT S ACT/NOR PPP: 124 % (ref 61–136)
PROT S PPP-ACNC: 113 % (ref 60–150)
SCREEN APTT: 35.9 SEC (ref 0–51.9)
SCREEN DRVVT: 39.7 SEC (ref 0–47)
THROMBIN TIME: 18.5 SEC (ref 0–23)

## 2022-05-16 ENCOUNTER — TELEPHONE (OUTPATIENT)
Dept: HEMATOLOGY ONCOLOGY | Facility: CLINIC | Age: 77
End: 2022-05-16

## 2022-05-16 ENCOUNTER — OFFICE VISIT (OUTPATIENT)
Dept: FAMILY MEDICINE CLINIC | Facility: CLINIC | Age: 77
End: 2022-05-16
Payer: COMMERCIAL

## 2022-05-16 VITALS
DIASTOLIC BLOOD PRESSURE: 70 MMHG | BODY MASS INDEX: 30.91 KG/M2 | SYSTOLIC BLOOD PRESSURE: 150 MMHG | HEART RATE: 85 BPM | RESPIRATION RATE: 18 BRPM | OXYGEN SATURATION: 87 % | TEMPERATURE: 98.2 F | WEIGHT: 163.7 LBS | HEIGHT: 61 IN

## 2022-05-16 DIAGNOSIS — L03.116 CELLULITIS OF LEFT LOWER EXTREMITY: Primary | ICD-10-CM

## 2022-05-16 DIAGNOSIS — D72.829 LEUKOCYTOSIS, UNSPECIFIED TYPE: ICD-10-CM

## 2022-05-16 LAB
PROT C AG ACT/NOR PPP IA: 147 % OF NORMAL (ref 60–150)
PROT S ACT/NOR PPP: 118 % (ref 68–108)

## 2022-05-16 PROCEDURE — 1036F TOBACCO NON-USER: CPT | Performed by: FAMILY MEDICINE

## 2022-05-16 PROCEDURE — 99213 OFFICE O/P EST LOW 20 MIN: CPT | Performed by: FAMILY MEDICINE

## 2022-05-16 PROCEDURE — 1111F DSCHRG MED/CURRENT MED MERGE: CPT | Performed by: FAMILY MEDICINE

## 2022-05-16 PROCEDURE — 1160F RVW MEDS BY RX/DR IN RCRD: CPT | Performed by: FAMILY MEDICINE

## 2022-05-16 NOTE — PROGRESS NOTES
Assessment/Plan:     Diagnoses and all orders for this visit:    Cellulitis of left lower extremity  No current rash on either LE  No recurrence  Leukocytosis, unspecified type  WBC 25 when discharged from hospital, downtrended to 16 post-discharge  Flow cytometry negative  Overdue for heme-onc follow up  Front staff at clinic helped schedule appt for pt on 6/8  Subjective:      Patient ID: Nataliia Mendosa is a 68 y o  female  HPI  Pt is here for cellulitis follow up  Recently admitted for LLE cellulitis, improved at [facility]  Denies any new rash, fever  Has not scheduled appt with Heme Onc for leukocytosis  The following portions of the patient's history were reviewed and updated as appropriate: allergies, current medications, past family history, past medical history, past social history, past surgical history and problem list     Review of Systems   Constitutional: Negative for chills and fever  Respiratory: Negative for chest tightness and shortness of breath  Cardiovascular: Negative for chest pain and palpitations  Gastrointestinal: Negative for abdominal pain  Genitourinary: Negative for dysuria  Skin: Negative for rash  Neurological: Negative for light-headedness and headaches  Psychiatric/Behavioral: Negative for dysphoric mood and suicidal ideas  The patient is not nervous/anxious  Objective:      /70   Pulse 85   Temp 98 2 °F (36 8 °C) (Tympanic)   Resp 18   Ht 5' 1" (1 549 m)   Wt 74 3 kg (163 lb 11 2 oz)   SpO2 (!) 87%   BMI 30 93 kg/m²          Physical Exam  Constitutional:       Appearance: Normal appearance  HENT:      Head: Normocephalic and atraumatic  Cardiovascular:      Rate and Rhythm: Normal rate and regular rhythm  Pulses: Normal pulses  Heart sounds: Normal heart sounds  No murmur heard  Pulmonary:      Effort: Pulmonary effort is normal  No respiratory distress  Breath sounds: Normal breath sounds  No wheezing  Abdominal:      General: Bowel sounds are normal  There is no distension  Palpations: Abdomen is soft  Tenderness: There is no abdominal tenderness  Musculoskeletal:      Right lower leg: No edema  Left lower leg: No edema  Skin:     General: Skin is warm and dry  Findings: No rash (no rash on BL LE)  Neurological:      General: No focal deficit present  Mental Status: She is alert and oriented to person, place, and time     Psychiatric:         Mood and Affect: Mood normal          Behavior: Behavior normal

## 2022-05-16 NOTE — TELEPHONE ENCOUNTER
New Patient Intake Form   Patient Details:    Kym Mancilla  1945    Appointment Information   Who is calling to schedule? [facility]   If not self, what is the caller's name? Please put name of RBC nurse as well  Addis   DID YOU CONFIRM INSURANCE WITH PATIENT? Yes   Referring provider Jason KAUFFMAN   What is the diagnosis? Leukocytosis     Is there a confirmed tissue diagnosis? N/A   Is there a biopsy ordered or pending? Please specify dates   N/A     Is patient aware of diagnosis? Yes   Have you had any imaging or labs done? If yes, where? (If imaging done outside of [facility], please remind patient to bring a disk ) Labs completed April 2022     If imaging done at outside facility, did you instruct patient to obtain discs and bring to visit? N/A   Have you been seen by another Oncologist/Hematologist?  If so, who and where? No   Are the records in [facility] or Care Everywhere? Yes   Does the patient have records at another facility/hospital? No   If yes, Name of facility, city and state where facility is located  Did you instruct patient to have records faxed to [facility] ([place], [place]) and provide rightfax number? [facility]   Is the patient willing to be seen by another provider? (This is for breast patients only) Yes     Did you send new patient paperwork? Email or mail?  N/A   Miscellaneous Information: Consult scheduled for 6-8-2022 @ 11:20am with Dr Ibrahim

## 2022-05-21 LAB
COMMENT: NORMAL
F2 GENE MUT ANL BLD/T: NORMAL
F5 GENE MUT ANL BLD/T: NORMAL
PROTHROMBIN G20210A MUTATION INTERPRETATION: NORMAL

## 2022-06-06 DIAGNOSIS — I26.99 ACUTE PULMONARY EMBOLISM WITHOUT ACUTE COR PULMONALE, UNSPECIFIED PULMONARY EMBOLISM TYPE (HCC): ICD-10-CM

## 2022-06-06 DIAGNOSIS — E78.5 DYSLIPIDEMIA: ICD-10-CM

## 2022-06-06 DIAGNOSIS — I10 ESSENTIAL HYPERTENSION: ICD-10-CM

## 2022-06-06 DIAGNOSIS — I25.10 CAD IN NATIVE ARTERY: ICD-10-CM

## 2022-06-06 DIAGNOSIS — J43.2 CENTRILOBULAR EMPHYSEMA (HCC): ICD-10-CM

## 2022-06-06 RX ORDER — ALBUTEROL SULFATE 90 UG/1
2 AEROSOL, METERED RESPIRATORY (INHALATION) 4 TIMES DAILY
Qty: 18 G | Refills: 2 | Status: SHIPPED | OUTPATIENT
Start: 2022-06-06

## 2022-06-06 RX ORDER — FLUTICASONE PROPIONATE 110 UG/1
1 AEROSOL, METERED RESPIRATORY (INHALATION) EVERY 12 HOURS SCHEDULED
Qty: 12 G | Refills: 0 | Status: SHIPPED | OUTPATIENT
Start: 2022-06-06

## 2022-06-06 RX ORDER — LISINOPRIL 5 MG/1
5 TABLET ORAL DAILY
Qty: 30 TABLET | Refills: 0 | Status: SHIPPED | OUTPATIENT
Start: 2022-06-06 | End: 2022-06-16 | Stop reason: SDUPTHER

## 2022-06-06 RX ORDER — ATORVASTATIN CALCIUM 40 MG/1
40 TABLET, FILM COATED ORAL DAILY
Qty: 90 TABLET | Refills: 0 | Status: SHIPPED | OUTPATIENT
Start: 2022-06-06

## 2022-06-06 NOTE — TELEPHONE ENCOUNTER
Manual Review Message: If patient with asthma requests more than 1 inhaler every 3 months, assess for uncontrolled symptoms and/or notify provider

## 2022-06-08 ENCOUNTER — TELEPHONE (OUTPATIENT)
Dept: HEMATOLOGY ONCOLOGY | Facility: MEDICAL CENTER | Age: 77
End: 2022-06-08

## 2022-06-08 DIAGNOSIS — J43.2 CENTRILOBULAR EMPHYSEMA (HCC): ICD-10-CM

## 2022-06-08 NOTE — TELEPHONE ENCOUNTER
Pt was a No Show for today with Dr Erika Vera  When contacted, she stated she moved out of the area and will not be rescheduling with us  She will search for a Hematologist in her area

## 2022-06-09 RX ORDER — FLUTICASONE FUROATE, UMECLIDINIUM BROMIDE AND VILANTEROL TRIFENATATE 100; 62.5; 25 UG/1; UG/1; UG/1
POWDER RESPIRATORY (INHALATION)
Qty: 60 EACH | Refills: 5 | Status: SHIPPED | OUTPATIENT
Start: 2022-06-09

## 2022-06-13 DIAGNOSIS — I10 ESSENTIAL HYPERTENSION: ICD-10-CM

## 2022-06-14 ENCOUNTER — DOCUMENTATION (OUTPATIENT)
Dept: HEMATOLOGY ONCOLOGY | Facility: MEDICAL CENTER | Age: 77
End: 2022-06-14

## 2022-06-14 RX ORDER — LISINOPRIL 5 MG/1
5 TABLET ORAL DAILY
Qty: 90 TABLET | Refills: 0 | OUTPATIENT
Start: 2022-06-14

## 2022-06-14 NOTE — PROGRESS NOTES
I spoke with patient today and she has moved and is no longer in the area  She said that she will be seeing her PCP very soon and will discuss with her on being referred to a Doctor near where she will be living

## 2022-06-16 DIAGNOSIS — I10 ESSENTIAL HYPERTENSION: ICD-10-CM

## 2022-06-16 RX ORDER — LISINOPRIL 5 MG/1
5 TABLET ORAL DAILY
Qty: 90 TABLET | Refills: 0 | Status: SHIPPED | OUTPATIENT
Start: 2022-06-16

## 2022-06-22 NOTE — TELEPHONE ENCOUNTER
06/22/22 5:22 PM        Thank you for your request  Your request has been received, reviewed, and the patient chart updated  The PCP has successfully been removed with a patient attribution note  This message will now be completed          Thank you  Savana Cox

## 2022-07-08 ENCOUNTER — TELEPHONE (OUTPATIENT)
Dept: PULMONOLOGY | Facility: CLINIC | Age: 77
End: 2022-07-08

## 2022-07-08 DIAGNOSIS — J96.11 CHRONIC RESPIRATORY FAILURE WITH HYPOXIA (HCC): Primary | ICD-10-CM

## 2022-07-08 NOTE — TELEPHONE ENCOUNTER
Returned patient's voicemail about getting more O2 tanks  Advised patient she needs to call [facility] where she gets her Oxygen from

## 2022-07-13 NOTE — TELEPHONE ENCOUNTER
Pt's daughter, Cherry Paz [place] re: [facility] is out of her O2     Please advise Mone Garcia): [phone]

## 2022-07-13 NOTE — TELEPHONE ENCOUNTER
Pt's daughter Chaparro Land calling saying she spoke to someone this morning regarding [facility] and her O2  She said she moved to [facility] and is out of her O2  She was told by who she spoke with this morning that the O2 order we had was still good and we just needed a DME company and a fax number  She would like script to be faxed to [address] at [phone]  Please call Chaparro Land once order is sent at [phone]

## 2022-07-14 NOTE — TELEPHONE ENCOUNTER
Ps daughter calling again saying Jesica Yeh still does not have the order for O2  She states her mother is out of oxygen and she had to cancel all her doctor appts in [facility] because she cannot leave the house since she is out of O2  She said her other sister has been calling for [facility] for over a month and nothing is being done and states it is unacceptable  Please advise

## 2022-07-14 NOTE — TELEPHONE ENCOUNTER
Advised daughter that orders and notes were faxed to [facility]  Told her that she needs to find care near her [place] in [place] so her mother can receive oxygen

## 2022-07-15 NOTE — TELEPHONE ENCOUNTER
I called [facility] [phone] and was informed they did receive the orders, however they can not service pt because medicare already paid [facility], daughter Kurtis Wei was notified this morning  I then called [facility] spoke w/ Terry Montenegro and was informed records was sent to [facility] on 6/14 fax [phone] and on 7/12 fax # [phone] to transfer pt being [facility] doesn't have a site in [address]  I explained to Regine Stahlr won't be able to service pt, pt is been out of oxygen over a month which Terry Montenegro confirmed last delivery was on 5/17 6E and 3D  Terry Montenegro will email Ronak Iqbal (Director of order intake and Alyssa Mckeon () to see if pt could be serviced through [employer]"   Kurtis Sanjuana is aware we are working on solving this issue and will have either Ronak Ibqal or Alyssa Mckeon call her

## 2022-07-18 DIAGNOSIS — J43.2 CENTRILOBULAR EMPHYSEMA (HCC): ICD-10-CM

## 2022-07-18 RX ORDER — ALBUTEROL SULFATE 2.5 MG/3ML
2.5 SOLUTION RESPIRATORY (INHALATION) EVERY 6 HOURS PRN
Qty: 270 ML | Refills: 11 | Status: SHIPPED | OUTPATIENT
Start: 2022-07-18 | End: 2022-08-17

## 2022-10-11 PROBLEM — A41.9 SEPSIS (HCC): Status: RESOLVED | Noted: 2022-04-18 | Resolved: 2022-10-11

## 2023-01-04 DIAGNOSIS — J43.2 CENTRILOBULAR EMPHYSEMA (HCC): ICD-10-CM

## 2023-01-05 RX ORDER — FLUTICASONE FUROATE, UMECLIDINIUM BROMIDE AND VILANTEROL TRIFENATATE 100; 62.5; 25 UG/1; UG/1; UG/1
POWDER RESPIRATORY (INHALATION)
Qty: 60 EACH | Refills: 0 | Status: SHIPPED | OUTPATIENT
Start: 2023-01-05

## 2023-05-18 DIAGNOSIS — J43.2 CENTRILOBULAR EMPHYSEMA (HCC): ICD-10-CM

## 2023-05-24 DIAGNOSIS — J43.2 CENTRILOBULAR EMPHYSEMA (HCC): ICD-10-CM

## 2023-05-24 RX ORDER — ALBUTEROL SULFATE 90 UG/1
2 AEROSOL, METERED RESPIRATORY (INHALATION) 4 TIMES DAILY
Qty: 18 G | Refills: 5 | Status: SHIPPED | OUTPATIENT
Start: 2023-05-24

## 2023-05-24 RX ORDER — ALBUTEROL SULFATE 2.5 MG/3ML
2.5 SOLUTION RESPIRATORY (INHALATION) EVERY 6 HOURS PRN
Qty: 270 ML | Refills: 5 | Status: SHIPPED | OUTPATIENT
Start: 2023-05-24 | End: 2023-06-23

## 2023-10-24 DIAGNOSIS — J43.2 CENTRILOBULAR EMPHYSEMA (HCC): ICD-10-CM

## 2023-10-25 RX ORDER — ALBUTEROL SULFATE 2.5 MG/3ML
SOLUTION RESPIRATORY (INHALATION)
Qty: 360 ML | Refills: 11 | Status: SHIPPED | OUTPATIENT
Start: 2023-10-25

## 2024-03-06 NOTE — TELEPHONE ENCOUNTER
Dr Dunia Hernandez : pls send refill on requested med if possible   Thanks
Dr Eli Leon or Lanette(last apt 05/16/22)    Could you please refill this med to the patient    Fluticasone-umeclidinium vilanterol ([facility]) inhaler    [address] [place] [address] [phone]
[place]
No